# Patient Record
Sex: FEMALE | Race: ASIAN | NOT HISPANIC OR LATINO | Employment: UNEMPLOYED | URBAN - METROPOLITAN AREA
[De-identification: names, ages, dates, MRNs, and addresses within clinical notes are randomized per-mention and may not be internally consistent; named-entity substitution may affect disease eponyms.]

---

## 2018-06-29 ENCOUNTER — OFFICE VISIT - HEALTHEAST (OUTPATIENT)
Dept: FAMILY MEDICINE | Facility: CLINIC | Age: 58
End: 2018-06-29

## 2018-06-29 DIAGNOSIS — Z76.89 ENCOUNTER TO ESTABLISH CARE: ICD-10-CM

## 2018-06-29 DIAGNOSIS — I10 ESSENTIAL HYPERTENSION, BENIGN: ICD-10-CM

## 2018-06-29 DIAGNOSIS — B35.4: ICD-10-CM

## 2018-06-29 DIAGNOSIS — B35.3 DERMATOPHYTOSIS OF FOOT: ICD-10-CM

## 2018-06-29 ASSESSMENT — MIFFLIN-ST. JEOR: SCORE: 1267.83

## 2018-07-13 ENCOUNTER — OFFICE VISIT - HEALTHEAST (OUTPATIENT)
Dept: FAMILY MEDICINE | Facility: CLINIC | Age: 58
End: 2018-07-13

## 2018-07-13 DIAGNOSIS — M33.13 DERMATOMYOSITIS (H): ICD-10-CM

## 2018-07-13 DIAGNOSIS — I10 ESSENTIAL HYPERTENSION: ICD-10-CM

## 2018-07-13 LAB
ANION GAP SERPL CALCULATED.3IONS-SCNC: 11 MMOL/L (ref 5–18)
BASOPHILS # BLD AUTO: 0.1 THOU/UL (ref 0–0.2)
BASOPHILS NFR BLD AUTO: 1 % (ref 0–2)
BUN SERPL-MCNC: 18 MG/DL (ref 8–22)
CALCIUM SERPL-MCNC: 9.8 MG/DL (ref 8.5–10.5)
CHLORIDE BLD-SCNC: 103 MMOL/L (ref 98–107)
CHOLEST SERPL-MCNC: 295 MG/DL
CO2 SERPL-SCNC: 26 MMOL/L (ref 22–31)
CREAT SERPL-MCNC: 0.63 MG/DL (ref 0.6–1.1)
EOSINOPHIL # BLD AUTO: 0.2 THOU/UL (ref 0–0.4)
EOSINOPHIL NFR BLD AUTO: 1 % (ref 0–6)
ERYTHROCYTE [DISTWIDTH] IN BLOOD BY AUTOMATED COUNT: 13.2 % (ref 11–14.5)
FASTING STATUS PATIENT QL REPORTED: YES
GFR SERPL CREATININE-BSD FRML MDRD: >60 ML/MIN/1.73M2
GLUCOSE BLD-MCNC: 86 MG/DL (ref 70–125)
HCT VFR BLD AUTO: 42.2 % (ref 35–47)
HDLC SERPL-MCNC: 63 MG/DL
HGB BLD-MCNC: 13.6 G/DL (ref 12–16)
LDLC SERPL CALC-MCNC: 206 MG/DL
LYMPHOCYTES # BLD AUTO: 1.8 THOU/UL (ref 0.8–4.4)
LYMPHOCYTES NFR BLD AUTO: 16 % (ref 20–40)
MCH RBC QN AUTO: 26.6 PG (ref 27–34)
MCHC RBC AUTO-ENTMCNC: 32.2 G/DL (ref 32–36)
MCV RBC AUTO: 83 FL (ref 80–100)
MONOCYTES # BLD AUTO: 0.7 THOU/UL (ref 0–0.9)
MONOCYTES NFR BLD AUTO: 6 % (ref 2–10)
NEUTROPHILS # BLD AUTO: 8.8 THOU/UL (ref 2–7.7)
NEUTROPHILS NFR BLD AUTO: 76 % (ref 50–70)
PLATELET # BLD AUTO: 347 THOU/UL (ref 140–440)
PMV BLD AUTO: 7.4 FL (ref 7–10)
POTASSIUM BLD-SCNC: 4.5 MMOL/L (ref 3.5–5)
RBC # BLD AUTO: 5.1 MILL/UL (ref 3.8–5.4)
SODIUM SERPL-SCNC: 140 MMOL/L (ref 136–145)
TRIGL SERPL-MCNC: 128 MG/DL
WBC: 11.6 THOU/UL (ref 4–11)

## 2018-07-13 ASSESSMENT — MIFFLIN-ST. JEOR: SCORE: 1260.63

## 2018-08-17 ENCOUNTER — OFFICE VISIT - HEALTHEAST (OUTPATIENT)
Dept: FAMILY MEDICINE | Facility: CLINIC | Age: 58
End: 2018-08-17

## 2018-08-17 DIAGNOSIS — E78.5 HYPERLIPIDEMIA LDL GOAL <130: ICD-10-CM

## 2018-08-17 DIAGNOSIS — I10 BENIGN ESSENTIAL HYPERTENSION: ICD-10-CM

## 2018-08-17 ASSESSMENT — MIFFLIN-ST. JEOR: SCORE: 1275.09

## 2018-10-02 ENCOUNTER — OFFICE VISIT - HEALTHEAST (OUTPATIENT)
Dept: FAMILY MEDICINE | Facility: CLINIC | Age: 58
End: 2018-10-02

## 2018-10-02 DIAGNOSIS — Z23 INFLUENZA VACCINATION GIVEN: ICD-10-CM

## 2018-10-02 DIAGNOSIS — E78.5 HYPERLIPIDEMIA LDL GOAL <130: ICD-10-CM

## 2018-10-02 DIAGNOSIS — B37.2 YEAST DERMATITIS: ICD-10-CM

## 2018-10-02 DIAGNOSIS — I10 BENIGN ESSENTIAL HYPERTENSION: ICD-10-CM

## 2018-10-02 DIAGNOSIS — L98.9 SKIN LESION: ICD-10-CM

## 2018-10-02 ASSESSMENT — MIFFLIN-ST. JEOR: SCORE: 1275.64

## 2018-10-05 ENCOUNTER — RECORDS - HEALTHEAST (OUTPATIENT)
Dept: MAMMOGRAPHY | Facility: CLINIC | Age: 58
End: 2018-10-05

## 2018-10-05 DIAGNOSIS — Z12.31 ENCOUNTER FOR SCREENING MAMMOGRAM FOR MALIGNANT NEOPLASM OF BREAST: ICD-10-CM

## 2018-10-15 ENCOUNTER — COMMUNICATION - HEALTHEAST (OUTPATIENT)
Dept: FAMILY MEDICINE | Facility: CLINIC | Age: 58
End: 2018-10-15

## 2018-10-15 DIAGNOSIS — Z11.1 TUBERCULOSIS SCREENING: ICD-10-CM

## 2018-10-23 ENCOUNTER — OFFICE VISIT - HEALTHEAST (OUTPATIENT)
Dept: FAMILY MEDICINE | Facility: CLINIC | Age: 58
End: 2018-10-23

## 2018-10-23 DIAGNOSIS — Z02.1 PHYSICAL EXAM, PRE-EMPLOYMENT: ICD-10-CM

## 2018-10-23 DIAGNOSIS — I10 BENIGN ESSENTIAL HYPERTENSION: ICD-10-CM

## 2018-10-23 ASSESSMENT — MIFFLIN-ST. JEOR: SCORE: 1269.19

## 2018-10-26 LAB
GAMMA INTERFERON BACKGROUND BLD IA-ACNC: 0.09 IU/ML
M TB IFN-G BLD-IMP: NEGATIVE
MITOGEN IGNF BCKGRD COR BLD-ACNC: -0.01 IU/ML
MITOGEN IGNF BCKGRD COR BLD-ACNC: 0 IU/ML
QTF INTERPRETATION: NORMAL
QTF MITOGEN - NIL: >10 IU/ML

## 2018-10-31 ENCOUNTER — COMMUNICATION - HEALTHEAST (OUTPATIENT)
Dept: FAMILY MEDICINE | Facility: CLINIC | Age: 58
End: 2018-10-31

## 2018-11-26 ENCOUNTER — COMMUNICATION - HEALTHEAST (OUTPATIENT)
Dept: FAMILY MEDICINE | Facility: CLINIC | Age: 58
End: 2018-11-26

## 2018-12-21 ENCOUNTER — OFFICE VISIT - HEALTHEAST (OUTPATIENT)
Dept: FAMILY MEDICINE | Facility: CLINIC | Age: 58
End: 2018-12-21

## 2018-12-21 ENCOUNTER — RECORDS - HEALTHEAST (OUTPATIENT)
Dept: ADMINISTRATIVE | Facility: OTHER | Age: 58
End: 2018-12-21

## 2018-12-21 ENCOUNTER — COMMUNICATION - HEALTHEAST (OUTPATIENT)
Dept: NURSING | Facility: CLINIC | Age: 58
End: 2018-12-21

## 2018-12-21 DIAGNOSIS — E78.5 HYPERLIPIDEMIA LDL GOAL <130: ICD-10-CM

## 2018-12-21 DIAGNOSIS — Z12.12 ENCOUNTER FOR COLORECTAL CANCER SCREENING: ICD-10-CM

## 2018-12-21 DIAGNOSIS — I10 BENIGN ESSENTIAL HYPERTENSION: ICD-10-CM

## 2018-12-21 DIAGNOSIS — Z12.11 ENCOUNTER FOR COLORECTAL CANCER SCREENING: ICD-10-CM

## 2018-12-21 LAB
CHOLEST SERPL-MCNC: 272 MG/DL
FASTING STATUS PATIENT QL REPORTED: YES
HDLC SERPL-MCNC: 62 MG/DL
LDLC SERPL CALC-MCNC: 181 MG/DL
TRIGL SERPL-MCNC: 145 MG/DL

## 2018-12-21 ASSESSMENT — MIFFLIN-ST. JEOR: SCORE: 1278.27

## 2020-01-03 ENCOUNTER — COMMUNICATION - HEALTHEAST (OUTPATIENT)
Dept: FAMILY MEDICINE | Facility: CLINIC | Age: 60
End: 2020-01-03

## 2020-01-03 DIAGNOSIS — I10 BENIGN ESSENTIAL HYPERTENSION: ICD-10-CM

## 2020-02-13 ENCOUNTER — COMMUNICATION - HEALTHEAST (OUTPATIENT)
Dept: FAMILY MEDICINE | Facility: CLINIC | Age: 60
End: 2020-02-13

## 2020-02-13 DIAGNOSIS — I10 BENIGN ESSENTIAL HYPERTENSION: ICD-10-CM

## 2020-03-03 ENCOUNTER — COMMUNICATION - HEALTHEAST (OUTPATIENT)
Dept: FAMILY MEDICINE | Facility: CLINIC | Age: 60
End: 2020-03-03

## 2020-03-10 ENCOUNTER — OFFICE VISIT - HEALTHEAST (OUTPATIENT)
Dept: FAMILY MEDICINE | Facility: CLINIC | Age: 60
End: 2020-03-10

## 2020-03-10 DIAGNOSIS — F43.10 PTSD (POST-TRAUMATIC STRESS DISORDER): ICD-10-CM

## 2020-03-10 DIAGNOSIS — I10 BENIGN ESSENTIAL HYPERTENSION: ICD-10-CM

## 2020-03-10 DIAGNOSIS — E78.5 HYPERLIPIDEMIA LDL GOAL <160: ICD-10-CM

## 2020-03-10 DIAGNOSIS — Z23 NEED FOR IMMUNIZATION AGAINST INFLUENZA: ICD-10-CM

## 2020-03-10 LAB
ANION GAP SERPL CALCULATED.3IONS-SCNC: 14 MMOL/L (ref 5–18)
BUN SERPL-MCNC: 20 MG/DL (ref 8–22)
CALCIUM SERPL-MCNC: 10 MG/DL (ref 8.5–10.5)
CHLORIDE BLD-SCNC: 102 MMOL/L (ref 98–107)
CHOLEST SERPL-MCNC: 287 MG/DL
CO2 SERPL-SCNC: 25 MMOL/L (ref 22–31)
CREAT SERPL-MCNC: 0.68 MG/DL (ref 0.6–1.1)
FASTING STATUS PATIENT QL REPORTED: YES
GFR SERPL CREATININE-BSD FRML MDRD: >60 ML/MIN/1.73M2
GLUCOSE BLD-MCNC: 109 MG/DL (ref 70–125)
HDLC SERPL-MCNC: 62 MG/DL
LDLC SERPL CALC-MCNC: 208 MG/DL
POTASSIUM BLD-SCNC: 4 MMOL/L (ref 3.5–5)
SODIUM SERPL-SCNC: 141 MMOL/L (ref 136–145)
TRIGL SERPL-MCNC: 86 MG/DL

## 2020-03-10 ASSESSMENT — MIFFLIN-ST. JEOR: SCORE: 1303.76

## 2020-04-17 ENCOUNTER — COMMUNICATION - HEALTHEAST (OUTPATIENT)
Dept: ADMINISTRATIVE | Facility: CLINIC | Age: 60
End: 2020-04-17

## 2020-05-08 ENCOUNTER — VIRTUAL VISIT (OUTPATIENT)
Dept: URGENT CARE | Facility: CLINIC | Age: 60
End: 2020-05-08
Payer: COMMERCIAL

## 2020-05-08 DIAGNOSIS — R52 BODY ACHES: Primary | ICD-10-CM

## 2020-05-08 DIAGNOSIS — R42 DIZZINESS: ICD-10-CM

## 2020-05-08 DIAGNOSIS — R63.0 LOSS OF APPETITE: ICD-10-CM

## 2020-05-08 DIAGNOSIS — R51.9 NONINTRACTABLE HEADACHE, UNSPECIFIED CHRONICITY PATTERN, UNSPECIFIED HEADACHE TYPE: ICD-10-CM

## 2020-05-08 PROBLEM — I10 BENIGN ESSENTIAL HYPERTENSION: Status: ACTIVE | Noted: 2018-08-17

## 2020-05-08 PROBLEM — E78.5 HYPERLIPIDEMIA LDL GOAL <130: Status: ACTIVE | Noted: 2018-08-17

## 2020-05-08 PROCEDURE — 99203 OFFICE O/P NEW LOW 30 MIN: CPT | Mod: 95 | Performed by: STUDENT IN AN ORGANIZED HEALTH CARE EDUCATION/TRAINING PROGRAM

## 2020-05-08 RX ORDER — HYDROCHLOROTHIAZIDE 25 MG/1
25 TABLET ORAL DAILY
COMMUNITY
Start: 2020-03-10 | End: 2021-08-05

## 2020-05-08 RX ORDER — LISINOPRIL 20 MG/1
20 TABLET ORAL DAILY
COMMUNITY
Start: 2020-03-10 | End: 2021-08-05

## 2020-05-08 NOTE — PROGRESS NOTES
"Breana Conner is a 60 year old female who is being evaluated via a billable telephone visit.      The patient has been notified of following:     \"This telephone visit will be conducted via a call between you and your physician/provider. We have found that certain health care needs can be provided without the need for a physical exam.  This service lets us provide the care you need with a short phone conversation.  If a prescription is necessary we can send it directly to your pharmacy.  If lab work is needed we can place an order for that and you can then stop by our lab to have the test done at a later time.    Telephone visits are billed at different rates depending on your insurance coverage. During this emergency period, for some insurers they may be billed the same as an in-person visit.  Please reach out to your insurance provider with any questions.    If during the course of the call the physician/provider feels a telephone visit is not appropriate, you will not be charged for this service.\"    Patient has given verbal consent for Telephone visit?  Yes      How would you like to obtain your AVS? Mail a copy    Subjective     Breana Conner is a 60 year old female who presents to clinic today for the following health issues:    HPI     Patient is being seen by phone visit for concern about dizziness and body aches. This visit is being done by phone with a Jossy .     Acute Illness   Acute illness concerns: Dizziness, body aches, subjective fevers  Onset: 3 days     Fever: YES    Chills/Sweats: YES    Headache (location?): YES    Sinus Pressure:no    Conjunctivitis:  no    Ear Pain: no    Rhinorrhea: no    Congestion: no    Sore Throat: no     Cough: no    Wheeze: no    Decreased Appetite: YES    Nausea: no    Vomiting: no    Diarrhea:  no    Dysuria/Freq.: no    Fatigue/Achiness: YES    Sick/Strep Exposure: YES- at woek     Therapies Tried and outcome: none    BP Readings from Last 3 Encounters:   No " data found for BP    Wt Readings from Last 3 Encounters:   No data found for Wt                    Reviewed and updated as needed this visit by Provider  Allergies  Meds  Problems  Med Hx  Surg Hx  Fam Hx  Soc Hx        Review of Systems   ROS COMP: Constitutional, HEENT, cardiovascular, pulmonary, GI, , musculoskeletal, neuro, skin, endocrine and psych systems are negative, except as otherwise noted.       Objective   Reported vitals:  There were no vitals taken for this visit.   healthy, alert and no distress  PSYCH: Alert and oriented times 3; coherent speech, normal   rate and volume, able to articulate logical thoughts, able   to abstract reason, no tangential thoughts, no hallucinations   or delusions  Her affect is normal  RESP: No cough, no audible wheezing, able to talk in full sentences  Remainder of exam unable to be completed due to telephone visits    Diagnostic Test Results:  none         Assessment/Plan:  ASSESSMENT AND PLAN    ICD-10-CM    1. Body aches  R52    2. Nonintractable headache, unspecified chronicity pattern, unspecified headache type  R51    3. Loss of appetite  R63.0    4. Dizziness  R42      I will have patient get scheduled for Covid testing and instructed her to stay home for at least 10 days and at least 72 hours after symptoms resolve. I will refer her to the [facility] as well and will provide her with a work. Note. She voices understanding and denies any other questions or concerns at this time.       No follow-ups on file.      Phone call duration:  15 minutes    Che Bullock PA-C

## 2020-05-08 NOTE — LETTER
May 8, 2020      Breana Conner  [address] [place] [address]        To Whom It May Concern:    Breana Conner  was seen on 5/8.  Please excuse her  until 5/19 due to acute illness. She will be tested for Covid-19 and if positive cannot return until 10 days from symptom onset and 72 hours after resolution of symptoms.         Sincerely,        Che Bullock PA-C

## 2020-05-08 NOTE — PROGRESS NOTES
Breana Conner is 60 year old with a past history of HTN who is evaluated via virtual urgent care  I spoke with the patient and her niece today. Patient herself felt unwell to answer the phone and delegated the niece to speak on her behalf.   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 Has endorsed 3 days of fever/diffuse body aches/headache/dizziness and poor appetite. Dizziness is so bad that she has not been able to get out of bed for two days. Denies vertigo. Endorses lack of appetite but denies n/v/d/abd pain. No  sxs. Denies resp sxs such as cough/rhinorrhea/sob.   She takes lisinopril and hydrochlorothiazide which she was able to tolerate for the past two days. .     I have recommended this patient go to the emergency department for evaluation.     Pt dicussed and staffed with Dr. Dorcas Leslie MD   Internal Medicine PGY2  5/8/2020 2:43 PM

## 2020-05-08 NOTE — PROGRESS NOTES
I was present during the key/critical portion of the telephone visit. The patient acknowledged that I participated in the telephone conversation. I discussed the case with the resident and agree with the note as documented by the resident.    I personally spent a total of 3-5 minutes speaking with Breana Conner through an  during today s visit.     Smione Toscano MD  5/8/2020 at 3:08 PM         [facility]  Department of Family Medicine and On license of [facility]

## 2020-07-22 ENCOUNTER — OFFICE VISIT - HEALTHEAST (OUTPATIENT)
Dept: FAMILY MEDICINE | Facility: CLINIC | Age: 60
End: 2020-07-22

## 2020-07-22 DIAGNOSIS — M17.12 OSTEOARTHRITIS OF LEFT KNEE, UNSPECIFIED OSTEOARTHRITIS TYPE: ICD-10-CM

## 2020-07-22 DIAGNOSIS — I10 BENIGN ESSENTIAL HYPERTENSION: ICD-10-CM

## 2020-07-22 ASSESSMENT — MIFFLIN-ST. JEOR: SCORE: 1335.82

## 2020-08-12 ENCOUNTER — OFFICE VISIT - HEALTHEAST (OUTPATIENT)
Dept: FAMILY MEDICINE | Facility: CLINIC | Age: 60
End: 2020-08-12

## 2020-08-12 ENCOUNTER — RECORDS - HEALTHEAST (OUTPATIENT)
Dept: MAMMOGRAPHY | Facility: CLINIC | Age: 60
End: 2020-08-12

## 2020-08-12 DIAGNOSIS — M17.12 OSTEOARTHRITIS OF LEFT KNEE, UNSPECIFIED OSTEOARTHRITIS TYPE: ICD-10-CM

## 2020-08-12 DIAGNOSIS — Z12.31 VISIT FOR SCREENING MAMMOGRAM: ICD-10-CM

## 2020-08-12 DIAGNOSIS — Z12.31 ENCOUNTER FOR SCREENING MAMMOGRAM FOR MALIGNANT NEOPLASM OF BREAST: ICD-10-CM

## 2020-08-12 DIAGNOSIS — I10 BENIGN ESSENTIAL HYPERTENSION: ICD-10-CM

## 2020-08-12 ASSESSMENT — MIFFLIN-ST. JEOR: SCORE: 1320.56

## 2020-09-09 ENCOUNTER — OFFICE VISIT - HEALTHEAST (OUTPATIENT)
Dept: FAMILY MEDICINE | Facility: CLINIC | Age: 60
End: 2020-09-09

## 2020-09-09 DIAGNOSIS — I10 BENIGN ESSENTIAL HYPERTENSION: ICD-10-CM

## 2020-09-09 DIAGNOSIS — Z23 NEED FOR IMMUNIZATION AGAINST INFLUENZA: ICD-10-CM

## 2020-09-09 ASSESSMENT — MIFFLIN-ST. JEOR: SCORE: 1313.64

## 2021-06-01 VITALS — HEIGHT: 60 IN | WEIGHT: 172.9 LBS | BODY MASS INDEX: 33.95 KG/M2

## 2021-06-01 VITALS — HEIGHT: 60 IN | WEIGHT: 171.31 LBS | BODY MASS INDEX: 33.63 KG/M2

## 2021-06-01 VITALS — BODY MASS INDEX: 34.26 KG/M2 | HEIGHT: 60 IN | WEIGHT: 174.5 LBS

## 2021-06-02 VITALS — WEIGHT: 174.8 LBS | BODY MASS INDEX: 35.24 KG/M2 | HEIGHT: 59 IN

## 2021-06-02 VITALS — HEIGHT: 59 IN | WEIGHT: 173.38 LBS | BODY MASS INDEX: 34.95 KG/M2

## 2021-06-02 VITALS — HEIGHT: 59 IN | BODY MASS INDEX: 35.36 KG/M2 | WEIGHT: 175.38 LBS

## 2021-06-04 VITALS
TEMPERATURE: 98 F | DIASTOLIC BLOOD PRESSURE: 92 MMHG | WEIGHT: 188.06 LBS | BODY MASS INDEX: 37.91 KG/M2 | HEART RATE: 72 BPM | SYSTOLIC BLOOD PRESSURE: 152 MMHG | HEIGHT: 59 IN | RESPIRATION RATE: 20 BRPM

## 2021-06-04 VITALS
HEIGHT: 59 IN | HEART RATE: 92 BPM | SYSTOLIC BLOOD PRESSURE: 130 MMHG | RESPIRATION RATE: 11 BRPM | BODY MASS INDEX: 37.24 KG/M2 | TEMPERATURE: 98 F | WEIGHT: 184.7 LBS | OXYGEN SATURATION: 95 % | DIASTOLIC BLOOD PRESSURE: 88 MMHG

## 2021-06-04 VITALS
DIASTOLIC BLOOD PRESSURE: 80 MMHG | HEART RATE: 82 BPM | OXYGEN SATURATION: 96 % | SYSTOLIC BLOOD PRESSURE: 132 MMHG | HEIGHT: 60 IN | TEMPERATURE: 97.7 F | BODY MASS INDEX: 35.93 KG/M2 | WEIGHT: 183 LBS

## 2021-06-04 VITALS
BODY MASS INDEX: 36.49 KG/M2 | SYSTOLIC BLOOD PRESSURE: 138 MMHG | WEIGHT: 181 LBS | DIASTOLIC BLOOD PRESSURE: 82 MMHG | TEMPERATURE: 98.4 F | HEIGHT: 59 IN | HEART RATE: 84 BPM | OXYGEN SATURATION: 97 % | RESPIRATION RATE: 16 BRPM

## 2021-06-04 NOTE — TELEPHONE ENCOUNTER
RN cannot approve Refill Request--Lisinopril     RN can NOT refill this medication PCP messaged that patient is overdue for Labs, Office Visit and BP Check and Protocol failed and NO refill given.    RN cannot approve Refill Request- Hydrochlorothiazide     RN can NOT refill this medication PCP messaged that patient is overdue for Labs, Office Visit and BP check and Protocol failed and NO refill given.      Chanda Soler, [facility] Triage/Med Refill 1/5/2020    Requested Prescriptions   Pending Prescriptions Disp Refills     lisinopril (PRINIVIL,ZESTRIL) 20 MG tablet [Pharmacy Med Name: LISINOPRIL 20 MG TABLET] 30 tablet 0     Sig: TAKE 1 TABLET BY MOUTH DAILY       Ace Inhibitors Refill Protocol Failed - 1/3/2020  8:50 AM        Failed - PCP or prescribing provider visit in past 12 months       Last office visit with prescriber/PCP: 12/21/2018 Jose Geiger MD OR same dept: Visit date not found OR same specialty: 12/21/2018 Jose Geiger MD  Last physical: Visit date not found Last MTM visit: Visit date not found   Next visit within 3 mo: Visit date not found  Next physical within 3 mo: Visit date not found  Prescriber OR PCP: Jose Geiger MD  Last diagnosis associated with med order: There are no diagnoses linked to this encounter.  If protocol passes may refill for 12 months if within 3 months of last provider visit (or a total of 15 months).             Failed - Serum Potassium in past 12 months     No results found for: LN-POTASSIUM          Failed - Blood pressure filed in past 12 months     BP Readings from Last 1 Encounters:   12/21/18 130/78             Failed - Serum Creatinine in past 12 months     Creatinine   Date Value Ref Range Status   07/13/2018 0.63 0.60 - 1.10 mg/dL Final             hydroCHLOROthiazide (HYDRODIURIL) 25 MG tablet [Pharmacy Med Name: HYDROCHLOROTHIAZIDE 25 MG TAB] 30 tablet 0     Sig: TAKE ONE TABLET BY MOUTH ONCE DAILY       Diuretics/Combination Diuretics Refill Protocol   Failed - 1/3/2020  8:50 AM        Failed - Visit with PCP or prescribing provider visit in past 12 months     Last office visit with prescriber/PCP: 12/21/2018 Jose Geiger MD OR same dept: Visit date not found OR same specialty: 12/21/2018 Jose Geiger MD  Last physical: Visit date not found Last MTM visit: Visit date not found   Next visit within 3 mo: Visit date not found  Next physical within 3 mo: Visit date not found  Prescriber OR PCP: Jose Geiger MD  Last diagnosis associated with med order: There are no diagnoses linked to this encounter.  If protocol passes may refill for 12 months if within 3 months of last provider visit (or a total of 15 months).             Failed - Serum Potassium in past 12 months      No results found for: LN-POTASSIUM          Failed - Serum Sodium in past 12 months      No results found for: LN-SODIUM          Failed - Blood pressure on file in past 12 months     BP Readings from Last 1 Encounters:   12/21/18 130/78             Failed - Serum Creatinine in past 12 months      Creatinine   Date Value Ref Range Status   07/13/2018 0.63 0.60 - 1.10 mg/dL Final

## 2021-06-06 NOTE — PATIENT INSTRUCTIONS - HE
Hypertension:     BMP lab ordered.    Continue hydrochlorothiazide and lisinopril. Refills were given.    Hyperlipidemia:     Cholesterol labs ordered.    Eat less fried, oily, or fatty foods.    Work on healthy diet and regular exercise for weight loss.     Request for citizenship waiver:     Referral given for psychology evaluation.    Immunizations:     Influenza immunization given today.     Follow up:     Return to see a female provider in 3-4 weeks for annual physical and pap smear.

## 2021-06-06 NOTE — TELEPHONE ENCOUNTER
Who is calling:  Patient   Reason for Call:  Needs transportation for upcoming appointment on Tuesday March 10 at 8:15- patient was asked to arrive at 8:00 am  Date of last appointment with primary care: 12.21.2018  Okay to leave a detailed message: Yes

## 2021-06-06 NOTE — TELEPHONE ENCOUNTER
RN cannot approve Refill Request    RN can NOT refill this medication Protocol failed and NO refill given.     Kim Ramos, [facility] Triage/Med Refill 2/19/2020    Requested Prescriptions   Pending Prescriptions Disp Refills     lisinopriL (PRINIVIL,ZESTRIL) 20 MG tablet [Pharmacy Med Name: LISINOPRIL 20 MG TABLET] 30 tablet 0     Sig: TAKE 1 TABLET BY MOUTH DAILY       Ace Inhibitors Refill Protocol Failed - 2/13/2020 12:22 PM        Failed - PCP or prescribing provider visit in past 12 months       Last office visit with prescriber/PCP: 12/21/2018 Jose Geiger MD OR same dept: Visit date not found OR same specialty: 12/21/2018 Jose Geiger MD  Last physical: Visit date not found Last MTM visit: Visit date not found   Next visit within 3 mo: Visit date not found  Next physical within 3 mo: Visit date not found  Prescriber OR PCP: Jose Geiger MD  Last diagnosis associated with med order: 1. Benign essential hypertension  - lisinopril (PRINIVIL,ZESTRIL) 20 MG tablet [Pharmacy Med Name: LISINOPRIL 20 MG TABLET]; TAKE 1 TABLET BY MOUTH DAILY  Dispense: 30 tablet; Refill: 0  - hydroCHLOROthiazide (HYDRODIURIL) 25 MG tablet [Pharmacy Med Name: HYDROCHLOROTHIAZIDE 25 MG TAB]; TAKE ONE TABLET BY MOUTH ONCE DAILY  Dispense: 30 tablet; Refill: 0    If protocol passes may refill for 12 months if within 3 months of last provider visit (or a total of 15 months).             Failed - Serum Potassium in past 12 months     No results found for: LN-POTASSIUM          Failed - Blood pressure filed in past 12 months     BP Readings from Last 1 Encounters:   12/21/18 130/78             Failed - Serum Creatinine in past 12 months     Creatinine   Date Value Ref Range Status   07/13/2018 0.63 0.60 - 1.10 mg/dL Final             hydroCHLOROthiazide (HYDRODIURIL) 25 MG tablet [Pharmacy Med Name: HYDROCHLOROTHIAZIDE 25 MG TAB] 30 tablet 0     Sig: TAKE ONE TABLET BY MOUTH ONCE DAILY       Diuretics/Combination Diuretics Refill  Protocol  Failed - 2/13/2020 12:22 PM        Failed - Visit with PCP or prescribing provider visit in past 12 months     Last office visit with prescriber/PCP: 12/21/2018 Jose Geiger MD OR same dept: Visit date not found OR same specialty: 12/21/2018 Jose Geiger MD  Last physical: Visit date not found Last MTM visit: Visit date not found   Next visit within 3 mo: Visit date not found  Next physical within 3 mo: Visit date not found  Prescriber OR PCP: Jose Geiger MD  Last diagnosis associated with med order: 1. Benign essential hypertension  - lisinopril (PRINIVIL,ZESTRIL) 20 MG tablet [Pharmacy Med Name: LISINOPRIL 20 MG TABLET]; TAKE 1 TABLET BY MOUTH DAILY  Dispense: 30 tablet; Refill: 0  - hydroCHLOROthiazide (HYDRODIURIL) 25 MG tablet [Pharmacy Med Name: HYDROCHLOROTHIAZIDE 25 MG TAB]; TAKE ONE TABLET BY MOUTH ONCE DAILY  Dispense: 30 tablet; Refill: 0    If protocol passes may refill for 12 months if within 3 months of last provider visit (or a total of 15 months).             Failed - Serum Potassium in past 12 months      No results found for: LN-POTASSIUM          Failed - Serum Sodium in past 12 months      No results found for: LN-SODIUM          Failed - Blood pressure on file in past 12 months     BP Readings from Last 1 Encounters:   12/21/18 130/78             Failed - Serum Creatinine in past 12 months      Creatinine   Date Value Ref Range Status   07/13/2018 0.63 0.60 - 1.10 mg/dL Final
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
## 2021-06-06 NOTE — TELEPHONE ENCOUNTER
Benign essential hypertension blood pressures well controlled she takes lisinopril hydrochlorothiazide she is going to the bathroom 3 times at night but she says her sleep is not disturbed follow-up in 6 months she can obtain refills every month.

## 2021-06-09 NOTE — PROGRESS NOTES
"ASSESSMENT and plan   1. Benign essential hypertension  Blood pressure is elevated we recheck that and it was normotensive.  She is been taking her blood pressure medications faithfully I will recheck it in approximately 4 to 6 weeks.    2. Osteoarthritis of left knee, unspecified osteoarthritis type  She is complaining of pain on the inferior aspect of the left knee prescribed meloxicam side effects of medication discussed no fall she was off work has difficulty flexing her knee.  - meloxicam (MOBIC) 15 MG tablet; Take 1 tablet (15 mg total) by mouth daily.  Dispense: 30 tablet; Refill: 2      There are no Patient Instructions on file for this visit.    No orders of the defined types were placed in this encounter.    There are no discontinued medications.    No follow-ups on file.    CHIEF COMPLAINT:  Chief Complaint   Patient presents with     Knee Pain     left       HISTORY OF PRESENT ILLNESS:  Breana is a 60 y.o. female who is here today to follow-up for her knee pain of 1 weeks duration and her hypertension she reports that she started feeling pain when she was walking on her left knee she denies any falls in the standing up does not bother her knee she is never had this problem before and states she has been taking her blood pressure regularly.  The pain in her knee is a deep type of pain and only present when she walks    REVIEW OF SYSTEMS:     Musculoskeletal positive for knee pain  Otherwise 10 point review of  All other systems are negative.    PFSH:    Medical and social history obtained with the help of a certified     TOBACCO USE:  Social History     Tobacco Use   Smoking Status Never Smoker   Smokeless Tobacco Never Used       VITALS:  Vitals:    07/22/20 1146   BP: (!) 152/96   Pulse: 72   Resp: 20   Temp: 98  F (36.7  C)   TempSrc: Oral   Weight: 188 lb 1 oz (85.3 kg)   Height: 4' 11.45\" (1.51 m)     Wt Readings from Last 3 Encounters:   07/22/20 188 lb 1 oz (85.3 kg)   03/10/20 181 lb (82.1 " kg)   12/21/18 175 lb 6 oz (79.5 kg)       PHYSICAL EXAM:  Interactive lady seen [facility] exam no acute distress  HEENT neck supple mucous members moist, no lymph enlargement noted in the neck  Respiratory system clear to auscultation equal breath sounds no wheeze no crackles  CVS regular rate and rhythm no murmurs rubs gallops appreciated  Musculoskeletal system no effusion noted on cursory exam of the left knee.  Anterior and posterior drawer tests are negative Alphonso's test is negative flexion of the knee limited to 60 degrees because of tenderness extension is normal and range of motion    Power of left quadriceps is 5 out of 5    DATA REVIEWED:  Additional History from Old Records Summarized (2): 0  Decision to Obtain Records (1): 0  Radiology Tests Summarized or Ordered (1): 0  Labs Reviewed or Ordered (1): 0  Medicine Test Summarized or Ordered (1): 0  Independent Review of EKG or X-RAY(2 each): 0    The visit lasted a total of 25 minutes face to face with the patient. Over 50% of the time was spent counseling and educating the patient about   Knee pain and hypertension.    MEDICATIONS:  Current Outpatient Medications   Medication Sig Dispense Refill     hydroCHLOROthiazide (HYDRODIURIL) 25 MG tablet Take 1 tablet (25 mg total) by mouth daily. 30 tablet 9     lisinopriL (PRINIVIL,ZESTRIL) 20 MG tablet Take 1 tablet (20 mg total) by mouth daily. 30 tablet 9     hydrocortisone valerate (WEST-ERMELINDA) 0.2 % ointment APPLY TO AFFECTED AREA TWICE DAILY. 45 g 0     meloxicam (MOBIC) 15 MG tablet Take 1 tablet (15 mg total) by mouth daily. 30 tablet 2     No current facility-administered medications for this visit.      Jose gilliam md

## 2021-06-10 NOTE — PROGRESS NOTES
Assessment and plan  1. Visit for screening mammogram patient agrees for mammogram today. Mammo Screening Bilateral   2. Benign essential hypertension blood pressure still elevated she did not have any medications in the form of hydrochlorothiazide and lisinopril she said they were not available at the pharmacy.  I represcribed the medications and we called the pharmacy and apparently they were filled on 10 August but not picked up patient was made aware of this I will see her again in 4 to 6 weeks to recheck her blood pressure. lisinopriL (PRINIVIL,ZESTRIL) 20 MG tablet    hydroCHLOROthiazide (HYDRODIURIL) 25 MG tablet   3. Osteoarthritis of left knee, unspecified osteoarthritis type the meloxicam is working she no longer has knee pain she no longer has any difficulty standing or walking and is returned to work she can continue using the medication for another month then stop meloxicam (MOBIC) 15 MG tablet       There are no Patient Instructions on file for this visit.    No orders of the defined types were placed in this encounter.    There are no discontinued medications.    No follow-ups on file.    CHIEF COMPLAINT:  Chief Complaint   Patient presents with     Hypertension     Foot Pain     Left foot       HISTORY OF PRESENT ILLNESS:  Breana is a 60 y.o. female here for follow-up for hypertension and left knee pain she also needs a mammogram.  She reports that the knee pain medication helped her she no longer has knee pain he can sleep walk and work without discomfort no side effects are noted with the medication she did not get her blood pressure medication she said it was not given when they went to the pharmacy.    REVIEW OF SYSTEMS:     Musculoskeletal negative for knee pain  According to the patient 12 point review  All other systems are negative.    PFSH:    Social medical history obtained with the help of a certified     TOBACCO USE:  Social History     Tobacco Use   Smoking Status Never Smoker  "  Smokeless Tobacco Never Used       VITALS:  Vitals:    08/12/20 0805   BP: 138/90   Patient Site: Left Arm   Patient Position: Sitting   Cuff Size: Adult Large   Pulse: 92   Resp: 11   Temp: 98  F (36.7  C)   TempSrc: Oral   SpO2: 95%   Weight: 184 lb 11.2 oz (83.8 kg)   Height: 4' 11.45\" (1.51 m)     Wt Readings from Last 3 Encounters:   08/12/20 184 lb 11.2 oz (83.8 kg)   07/22/20 188 lb 1 oz (85.3 kg)   03/10/20 181 lb (82.1 kg)       PHYSICAL EXAM:  Interactive female sitting comfortably exam no acute distress  Respiratory system clear to auscultation good breath sounds no wheeze no crackles  CVS regular rate and rhythm no murmurs rubs gallops appreciated  Musculoskeletal system no tenderness elicited when I palpate the left patella.  She has no tenderness when I palpate the left quadriceps.  Lachman's test is negative anterior and posterior drawer tests are negative.  Neuro cranial nerves II to XII intact, motor tone lower extremities is normal gait is antalgic    DATA REVIEWED:  Additional History from Old Records Summarized (2): 0  Decision to Obtain Records (1): 0  Radiology Tests Summarized or Ordered (1): 0  Labs Reviewed or Ordered (1): 0  Medicine Test Summarized or Ordered (1): 0  Independent Review of EKG or X-RAY(2 each): 0    The visit lasted a total of 25 minutes face to face with the patient. Over 50% of the time was spent counseling and educating the patient about   Hypertension and knee pain we also explained the concept of mammograms to her.    MEDICATIONS:  Current Outpatient Medications   Medication Sig Dispense Refill     hydroCHLOROthiazide (HYDRODIURIL) 25 MG tablet Take 1 tablet (25 mg total) by mouth daily. 30 tablet 9     lisinopriL (PRINIVIL,ZESTRIL) 20 MG tablet Take 1 tablet (20 mg total) by mouth daily. 30 tablet 9     meloxicam (MOBIC) 15 MG tablet Take 1 tablet (15 mg total) by mouth daily. 30 tablet 2     hydrocortisone valerate (WEST-ERMELINDA) 0.2 % ointment APPLY TO AFFECTED AREA " TWICE DAILY. 45 g 0     No current facility-administered medications for this visit.      Lakesha SCHMIDT

## 2021-06-11 NOTE — PROGRESS NOTES
"ASSESSMENT and plan   1. Need for immunization against influenza  Agrees to have vaccination today  - Influenza, Recombinant, Inj, Quadrivalent, PF, 18+YRS    2. Benign essential hypertension  Blood pressure well controlled she is taking both hydrochlorothiazide and lisinopril no side effects noted follow-up in approximately 3 months.      There are no Patient Instructions on file for this visit.    Orders Placed This Encounter   Procedures     Influenza, Recombinant, Inj, Quadrivalent, PF, 18+YRS     There are no discontinued medications.    No follow-ups on file.    CHIEF COMPLAINT:  Chief Complaint   Patient presents with     Hypertension       HISTORY OF PRESENT ILLNESS:  Breana is a 60 y.o. female who is here to follow-up for hypertension she also wishes notes the situation with her citizenship review.  I had referred her to psychology but no one ever called her back because this happened in March.  She reports that she has no knee pain and stopped using the meloxicam.  She is sleeping well and feels energetic.    REVIEW OF SYSTEMS:     10 point review of  All other systems are negative.    PFSH:  Social history reviewed with the help of a certified  today    TOBACCO USE:  Social History     Tobacco Use   Smoking Status Never Smoker   Smokeless Tobacco Never Used       VITALS:  Vitals:    09/09/20 0834   BP: 132/80   Patient Site: Left Arm   Patient Position: Sitting   Cuff Size: Adult Large   Pulse: 82   Temp: 97.7  F (36.5  C)   TempSrc: Oral   SpO2: 96%   Weight: 183 lb (83 kg)   Height: 4' 11.5\" (1.511 m)     Wt Readings from Last 3 Encounters:   09/09/20 183 lb (83 kg)   08/12/20 184 lb 11.2 oz (83.8 kg)   07/22/20 188 lb 1 oz (85.3 kg)       PHYSICAL EXAM:  Interactive female seen  no acute distress  CVS regular rate and rhythm no murmurs rubs gallops appreciated no pedal edema  Respiratory system clear to auscultation equal breath sounds no wheeze no crackles    DATA REVIEWED:  Additional History " from Old Records Summarized (2): 0  Decision to Obtain Records (1): 0  Radiology Tests Summarized or Ordered (1): 0  Labs Reviewed or Ordered (1): 0  Medicine Test Summarized or Ordered (1): 0  Independent Review of EKG or X-RAY(2 each): 0    The visit lasted a total of 15 minutes face to face with the patient. Over 50% of the time was spent counseling and educating the patient about   Hypertension.    MEDICATIONS:  Current Outpatient Medications   Medication Sig Dispense Refill     hydroCHLOROthiazide (HYDRODIURIL) 25 MG tablet Take 1 tablet (25 mg total) by mouth daily. 30 tablet 9     lisinopriL (PRINIVIL,ZESTRIL) 20 MG tablet Take 1 tablet (20 mg total) by mouth daily. 30 tablet 9     hydrocortisone valerate (WEST-ERMELINDA) 0.2 % ointment APPLY TO AFFECTED AREA TWICE DAILY. 45 g 0     meloxicam (MOBIC) 15 MG tablet Take 1 tablet (15 mg total) by mouth daily. 30 tablet 2     No current facility-administered medications for this visit.      Lakesha SCHMIDT

## 2021-06-17 NOTE — TELEPHONE ENCOUNTER
Telephone Encounter by Linda Baeza at 4/17/2020  1:02 PM     Author: Linda Baeza Service: -- Author Type: --    Filed: 4/17/2020  1:07 PM Encounter Date: 4/17/2020 Status: Signed    : Linda Baeza Dr.,    Regarding referral to [facility]    [facility], Dr. Luong reviewed the referral and it appears that she is able to hold a job and has not attempted a course. It would be difficult for us to make a case for medical necessity given her functional capabilities. We recommend at least 6 months of a prep course to show that she made effort and to assess true learning abilities.  Please let me know if you have further questions.    Thanks  Latanya

## 2021-06-19 NOTE — PROGRESS NOTES
ASSESSMENT and plan  1. Encounter to establish care  Patient moved to [place] approximately 3 weeks ago she lived for 4 years in [place].  According to the patient she did not have routine care there is as she had no insurance.  She was seen for a colonoscopy.  He was reported to be normal.  Her niece will bring in the name of the clinic at the next visit so I can access results    2. Essential hypertension, benign  Blood pressures elevated today she admits being dizzy and having headaches.  Denies chest pain.  Hydrochlorothiazide started side effects discussed recheck in 2 weeks.  I will check a BMP at next visit    3. Dermatophytosis of foot  She has a large very itchy rash present from the sole of her left foot extending up to the ankle.  It has been present for years slowly increasing in size.  There are no elevations in the skin noted the rash is flat and pruritic.    4. Body dermatophytosis  She has a rash involving the left abdominal wall and extending up to an area just inferior to the left breast the rash measures approximately 10 x 12 cm in size.  This is a fungal infection she was informed of this.  Diflucan has been started for this and the rash on the foot          There are no Patient Instructions on file for this visit.    Orders Placed This Encounter   Procedures     Tdap vaccine,  6yo or older,  IM     There are no discontinued medications.    No Follow-up on file.    CHIEF COMPLAINT:  Chief Complaint   Patient presents with     [facility]     Foot Problem       HISTORY OF PRESENT ILLNESS:  Breana is a 57 y.o. female who is here for the first time in our clinic with her niece.  She recently moved from [place].  She reports that occasionally she is dizzy but feels well.  She has noticed a rash on the side of her body in her foot that has been present for about a year it is very itchy.  She denies having treatment for it in the past.    REVIEW OF SYSTEMS:   General complains of dizziness  Neuro  "occasional headache noted  Skin rash as noted in the HPI  All other systems are negative.    PFSH:  Family, personal and social history reviewed and  found to be noncontributory obese  TOBACCO USE:  History   Smoking Status     Never Smoker   Smokeless Tobacco     Never Used       VITALS:  Vitals:    06/29/18 0859   BP: 166/86   Patient Site: Left Arm   Patient Position: Sitting   Cuff Size: Adult Regular   Pulse: 78   Temp: 98.3  F (36.8  C)   TempSrc: Oral   SpO2: 96%   Weight: 172 lb 14.4 oz (78.4 kg)   Height: 4' 11.5\" (1.511 m)     Wt Readings from Last 3 Encounters:   06/29/18 172 lb 14.4 oz (78.4 kg)       PHYSICAL EXAM:  Interactive obese elderly female sitting in exam room in no acute distress  Respiratory system clear to auscultation equal breath sounds no wheeze no crackles  CVS regular rate and rhythm no murmurs rubs gallops appreciated no pedal edema noted  CNS cranial 2-12 intact gait is normal reflexes are brisk motor tones normal no tremor noted  Skin there is peeling of the skin with erythema present on the left lower extremity extending from the ankle and into the dorsum and plantar aspect of the left foot.  There is a similar rash present on the left upper abdomen.  It extends up to an area approximately 3 cm inferior to the left breast the axilla is not involved there are no lesions noted.    DATA REVIEWED:  Additional History from Old Records Summarized (2): 0  Decision to Obtain Records (1): 0  Radiology Tests Summarized or Ordered (1): 0  Labs Reviewed or Ordered (1): 0  Medicine Test Summarized or Ordered (1): 0  Independent Review of EKG or X-RAY(2 each): 0    The visit lasted a total of 30 minutes face to face with the patient. Over 50% of the time was spent counseling and educating the patient about hypertension  And fungal infections.    MEDICATIONS:  Current Outpatient Prescriptions   Medication Sig Dispense Refill     fluconazole (DIFLUCAN) 150 MG tablet Take one tablet and repeat dose " after 1 week 2 tablet 0     hydroCHLOROthiazide (HYDRODIURIL) 25 MG tablet Take 1 tablet (25 mg total) by mouth daily. 30 tablet 12     No current facility-administered medications for this visit.

## 2021-06-19 NOTE — PROGRESS NOTES
"ASSESSMENT  And plan  1. Essential hypertension    Today she returns to check on her blood pressure she is been taking her hydrochlorothiazide daily in the morning, she has noted a little dizziness but not stabilized.  Her daughter reports that she has been compliant with the medication her blood pressure still elevated I am starting lisinopril recheck her blood pressure in approximately 4 weeks will be checking a BMP today also do a lipid profile and hemoglobin.    2. Dermatomyositis (H)    The rash has cleared she is used 2 doses of Diflucan the skin is no longer itchy.      There are no Patient Instructions on file for this visit.    No orders of the defined types were placed in this encounter.    There are no discontinued medications.    No Follow-up on file.    CHIEF COMPLAINT:  Chief Complaint   Patient presents with     Hypertension       HISTORY OF PRESENT ILLNESS:  Breana is a 57 y.o. female     Here for follow-up for hypertension and a fungal infection.  I saw her approximately 2 weeks ago.  She has been taking hydrochlorothiazide she finished the Diflucan.  Her daughter is here with her today.  Patient notes no shortness of breath chest pain or headache            REVIEW OF SYSTEMS:   CNS slight dizziness noted  All other 10 point review of systems are negative.    PFSH:      Lives with her family    Medical history is been reviewed    TOBACCO USE:  History   Smoking Status     Never Smoker   Smokeless Tobacco     Never Used       VITALS:  Vitals:    07/13/18 0807   BP: (!) 144/100   Pulse: 76   Resp: 14   Temp: 97.8  F (36.6  C)   TempSrc: Oral   SpO2: 95%   Weight: 171 lb 5 oz (77.7 kg)   Height: 4' 11.5\" (1.511 m)     Wt Readings from Last 3 Encounters:   07/13/18 171 lb 5 oz (77.7 kg)   06/29/18 172 lb 14.4 oz (78.4 kg)       PHYSICAL EXAM:    Interactive elderly appearing  female sitting comfortably exam room no acute distress  Respiratory system clear to auscultation equal breath sounds no wheeze " no crackles  CVS regular rate and rhythm no murmurs rubs or gallops no pedal edema  Skin warm well perfused rash is no longer present    DATA REVIEWED:  Additional History from Old Records Summarized (2): 0  Decision to Obtain Records (1): 0  Radiology Tests Summarized or Ordered (1): 0  Labs Reviewed or Ordered (1): bmp . ;ipid , hemogram  Medicine Test Summarized or Ordered (1): 0  Independent Review of EKG or X-RAY(2 each): 0    The visit lasted a total of [facility] is the most expensive minutes face to face with the patient. Over 50% of the time was spent counseling and educating the patient about hypertension    MEDICATIONS:  Current Outpatient Prescriptions   Medication Sig Dispense Refill     hydroCHLOROthiazide (HYDRODIURIL) 25 MG tablet Take 1 tablet (25 mg total) by mouth daily. 30 tablet 12     fluconazole (DIFLUCAN) 150 MG tablet Take one tablet and repeat dose after 1 week 2 tablet 0     No current facility-administered medications for this visit.         Please note that this clinical encounter uses voice recognition software, there may be typographical errors present

## 2021-06-19 NOTE — PROGRESS NOTES
"ASSESSMENT:  1. Benign essential hypertension blood pressure is adequately controlled on lisinopril hydrochlorothiazide she has been taking the medications faithfully.  Her BMP is within normal limits.  Recheck within 3-4 months.  Talked about reducing the sodium in her diet she no longer has dizziness    2. Hyperlipidemia LDL goal <130 LDL is elevated.  Discussed that today she is going to remove saturated fat from her diet as she is fond of fried foods        There are no Patient Instructions on file for this visit.    No orders of the defined types were placed in this encounter.    There are no discontinued medications.    No Follow-up on file.    CHIEF COMPLAINT:  Chief Complaint   Patient presents with     Hypertension       HISTORY OF PRESENT ILLNESS:  Breana is a 57 y.o. female     Who is here for follow-up for hypertension.  She been taking her medications faithfully she is here with her daughter.  She reports that she is doing well at home    REVIEW OF SYSTEMS:   10 point review of systems negative according the patient  All other systems are negative.    PFSH:  Not worlomh    TOBACCO USE:  History   Smoking Status     Never Smoker   Smokeless Tobacco     Never Used       VITALS:  Vitals:    08/17/18 0819   BP: 122/78   Pulse: 92   Resp: 20   Temp: 97.7  F (36.5  C)   TempSrc: Oral   SpO2: 97%   Weight: 174 lb 8 oz (79.2 kg)   Height: 4' 11.5\" (1.511 m)     Wt Readings from Last 3 Encounters:   08/17/18 174 lb 8 oz (79.2 kg)   07/13/18 171 lb 5 oz (77.7 kg)   06/29/18 172 lb 14.4 oz (78.4 kg)       PHYSICAL EXAM:  Interactive middle-aged lady sitting comfortably exam room no acute distress  CVS regular and rhythm no murmurs rubs or gallops  Respiratory system clear to auscultation equal breath sounds no wheeze no crackles    DATA REVIEWED:  Additional History from Old Records Summarized (2): 0  Decision to Obtain Records (1): 0  Radiology Tests Summarized or Ordered (1): 0  Labs Reviewed or Ordered (1): bmp " wnl cholesterol panel shows ldl 205 total cholesterol 295   Medicine Test Summarized or Ordered (1): 0  Independent Review of EKG or X-RAY(2 each): 0    The visit lasted a total of 15 minutes face to face with the patient. Over 50% of the time was spent counseling and educating the patient about huypertension and hyperlipidemia    MEDICATIONS:  Current Outpatient Prescriptions   Medication Sig Dispense Refill     hydroCHLOROthiazide (HYDRODIURIL) 25 MG tablet Take 1 tablet (25 mg total) by mouth daily. 30 tablet 12     lisinopril (PRINIVIL,ZESTRIL) 20 MG tablet Take 1 tablet (20 mg total) by mouth daily. 90 tablet 3     fluconazole (DIFLUCAN) 150 MG tablet Take one tablet and repeat dose after 1 week 2 tablet 0     No current facility-administered medications for this visit.     Please note that this clinical encounter uses voice recognition software, there may be typographical errors present

## 2021-06-20 NOTE — LETTER
Letter by Jose Geiger MD at      Author: Jose Geiger MD Service: -- Author Type: --    Filed:  Encounter Date: 7/22/2020 Status: (Other)         July 22, 2020     Patient: Breana Conner   YOB: 1960   Date of Visit: 7/22/2020       To Whom It May Concern:    It is my medical opinion that Breana Conner should remain out of work until July 30, 2020. Please excuse her absence from work on July 15, 2020 to July 29, 2020.    If you have any questions or concerns, please don't hesitate to call.    Sincerely,        Electronically signed by Jose Geiger MD

## 2021-06-20 NOTE — PROGRESS NOTES
ASSESSMENT:  1. Benign essential hypertension controlled he should continue take the hydrochlorothiazide and lisinopril.    2. Hyperlipidemia LDL goal <130 total cholesterol was almost 296.  Her triglycerides are normal recheck her cholesterol within 3 months she has already nstituted decreased fried food in her diet    3. Yeast dermatitis she developing yeast dermatitis below her left breast.  He has been itchy for a week Diflucan given.    4. Skin lesion she has a separate skin lesion with very thin plaques present on the left ankle and left leg.  She has been itching the area.  There is no blanching of the lesion.  The differential would include psoriasis.  Prescribing a medium potency corticosteroid to see if we can stop the itching I will re-visit this problem with her in 4 weeks    5. Influenza vaccination given         There are no Patient Instructions on file for this visit.    No orders of the defined types were placed in this encounter.    There are no discontinued medications.    No Follow-up on file.    CHIEF COMPLAINT:  Chief Complaint   Patient presents with     itchiness     left leg       HISTORY OF PRESENT ILLNESS:  Breana is a 58 y.o. female     Here to follow-up for hypertension her cholesterol issues and a skin lesion.  She says she has been taking the blood pressure medication.  She is reduce the salt in her diet.  She notes no side effects of the medication she is noticed that her skin has been itchy under the left breast for about a week and she is noticed that she developed a skin rash on her left leg for 2 weeks she has had this rash before and is gone away with antifungal treatment.  She denies having the rash on her leg in the past.    REVIEW OF SYSTEMS:     10 point review of systems negative except for skin please see HPI  All other systems are negative.    PFSH:    Lives with her daughter.    Currently not working    TOBACCO USE:  History   Smoking Status     Never Smoker   Smokeless  "Tobacco     Never Used       VITALS:  Vitals:    10/02/18 0846   BP: 118/74   Patient Site: Left Arm   Patient Position: Sitting   Cuff Size: Adult Regular   Pulse: 71   Temp: 97.6  F (36.4  C)   TempSrc: Oral   SpO2: 97%   Weight: 174 lb 12.8 oz (79.3 kg)   Height: 4' 11.45\" (1.51 m)     Wt Readings from Last 3 Encounters:   10/02/18 174 lb 12.8 oz (79.3 kg)   08/17/18 174 lb 8 oz (79.2 kg)   07/13/18 171 lb 5 oz (77.7 kg)       PHYSICAL EXAM:    Interactive obese female sitting in exam room no acute distress  CVS regular rate and rhythm no murmurs rubs gallops appreciated  Respiratory system clear to auscultation equal breath sounds no wheezes no crackles  ENT neck supple mucous membranes moist no lymph enlargement noted  Skin warm well perfused erythema and signs of excoriation noted with discharge present in the left inframamillary area.  Rash measures approximately 3 x 2 cm.  She has 4 distinct plaques on the dorsal aspect of the left ankle.  Signs of excoriation are present.    DATA REVIEWED:  Additional History from Old Records Summarized (2): 0  Decision to Obtain Records (1): 0  Radiology Tests Summarized or Ordered (1): 0  Labs Reviewed or Ordered (1): 0  Medicine Test Summarized or Ordered (1): 0  Independent Review of EKG or X-RAY(2 each): 0    The visit lasted a total of15 minutes face to face with the patient. Over 50% of the time was spent counseling and educating the patient about   Dermatitis, yeast infections, hyperlipidemia and hypertension.  .    MEDICATIONS:  Current Outpatient Prescriptions   Medication Sig Dispense Refill     hydroCHLOROthiazide (HYDRODIURIL) 25 MG tablet Take 1 tablet (25 mg total) by mouth daily. 30 tablet 12     lisinopril (PRINIVIL,ZESTRIL) 20 MG tablet Take 1 tablet (20 mg total) by mouth daily. 90 tablet 3     fluconazole (DIFLUCAN) 150 MG tablet Take one tablet and repeat dose after 1 week 2 tablet 0     No current facility-administered medications for this visit.  "   Please note that this clinical encounter uses voice recognition software, there may be typographical errors present

## 2021-06-21 NOTE — PROGRESS NOTES
"ASSESSMENT and plan   1. 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  She denies being treated for tuberculosis in the past she is never been hospitalized for long period of time or taken medications for long duration do a QuantiFERON test today.  - QTF-Mycobacterium tuberculosis by QuantiFERON-TB Gold Plus    2. Benign essential hypertension  Blood pressure control still not yet optimal.  Continue same dose of lisinopril recheck in 2 months as patient to regulate her sodium intake.            There are no Patient Instructions on file for this visit.    Orders Placed This Encounter   Procedures     QTF-Mycobacterium tuberculosis by QuantiFERON-TB Gold Plus     There are no discontinued medications.    No Follow-up on file.    CHIEF COMPLAINT:  Chief Complaint   Patient presents with     TB     for work       HISTORY OF PRESENT ILLNESS:  Breana is a 58 y.o. female   Is here for follow-up for her hypertension and also she wants to become a PCA.  She needs testing for that.  She is never had this job before.  She reports that she is been taking her blood pressure medication daily.  She says that she started to walk more and watch the saturated fat in her diet.    REVIEW OF SYSTEMS:   According to the patient 10 point review of systems is negative      PFSH:  Working as a PCA 4 hours/day    TOBACCO USE:  History   Smoking Status     Never Smoker   Smokeless Tobacco     Never Used       VITALS:  Vitals:    10/23/18 0839   BP: 130/80   Pulse: 70   Resp: 20   Temp: 97.5  F (36.4  C)   TempSrc: Oral   SpO2: 98%   Weight: 173 lb 6 oz (78.6 kg)   Height: 4' 11.45\" (1.51 m)     Wt Readings from Last 3 Encounters:   10/23/18 173 lb 6 oz (78.6 kg)   10/02/18 174 lb 12.8 oz (79.3 kg)   08/17/18 174 lb 8 oz (79.2 kg)       PHYSICAL EXAM:    Interactive obese middle-aged lady sitting comfortably exam room no acute distress  CVS regular rate and rhythm no murmurs rubs " gallops  Respiratory system clear to auscultation equal breath sounds no wheezes no crackles  Lymphatic system no lymph enlargement noted in her neck axillary area or supraclavicular area    DATA REVIEWED:  Additional History from Old Records Summarized (2): 00  Decision to Obtain Records (1): 0  Radiology Tests Summarized or Ordered (1): 0  Labs Reviewed or Ordered (1):   QuantiFERON  Medicine Test Summarized or Ordered (1): 0  Independent Review of EKG or X-RAY(2 each): 0    The visit lasted a total of 25 minutes face to face with the patient. Over 50% of the time was spent counseling and educating the patient about       Hypertension and latent tuberculosis.    MEDICATIONS:  Current Outpatient Prescriptions   Medication Sig Dispense Refill     fluconazole (DIFLUCAN) 150 MG tablet Take one tablet and repeat dose after 1 week 2 tablet 0     hydroCHLOROthiazide (HYDRODIURIL) 25 MG tablet Take 1 tablet (25 mg total) by mouth daily. 30 tablet 12     hydrocortisone valerate (WEST-ERMELINDA) 0.2 % ointment Apply to affected area twice daily 45 g 0     lisinopril (PRINIVIL,ZESTRIL) 20 MG tablet Take 1 tablet (20 mg total) by mouth daily. 90 tablet 3     No current facility-administered medications for this visit.         Please note that this clinical encounter uses voice recognition software, there may be typographical errors present

## 2021-06-22 NOTE — TELEPHONE ENCOUNTER
Care Guide called [facility] back to check on the status of whether they have updated the  for this patient in their system as this needed to happen in order for [facility] to resend denied claims.    Care Guide confirmed with [facility] that they do have the correct  for the patient in their system as 1960 and they report that [facility] can resend out their claims.        Care Guide reached out to [facility] and informed them that any claims that were unpaid due to the incorrect  in [facility]'s system can now be sent back to [facility] for payment.        Care Guide placed a call to [facility] with a Jossy interp ([id number]) and informed her that her  is now correct in [facility]'s system and that they should not be receiving no more [facility] bills.

## 2021-06-22 NOTE — PROGRESS NOTES
ASSESSMENT AND PLAN:  1. Benign essential hypertension blood pressures well controlled she takes lisinopril hydrochlorothiazide she is going to the bathroom 3 times at night but she says her sleep is not disturbed follow-up in 6 months she can obtain refills every month.    2. Hyperlipidemia LDL goal <130 reviewed a cholesterol panel total cholesterol is above 290 we will repeat that today because she is fasting she really has not modified her diet to a significant degree Lipid Cascade   3. Encounter for colorectal cancer screening       She is not willing to go for colonoscopy however she was amenable to the idea of a colo guard test.  Explained the procedure to her and ordered the test    Total amount time spent today the patient exceeded 25 minutes with greater than 50% was spent coordinating care and discussing the colonoscopy and the potential Colho guard test with her      Orders Placed This Encounter   Procedures     Lipid Cascade     Order Specific Question:   Fasting is required?     Answer:   Yes     Medications Discontinued During This Encounter   Medication Reason     lisinopril (PRINIVIL,ZESTRIL) 20 MG tablet Reorder     hydroCHLOROthiazide (HYDRODIURIL) 25 MG tablet Reorder       Return in about 6 months (around 6/21/2019).    CHIEF COMPLAINT:  Chief Complaint   Patient presents with     Hypertension       HISTORY OF PRESENT ILLNESS:  Breana is a 58 y.o. female who presents to the clinic today for blood pressure recheck.. Breana is present with a Jossy . I last saw her on 10/23/2018 at which time her blood pressure had been elevated. Recommendations had been given for continuing lisinopril and hydrochlorothiazide, and instructions to regulate her sodium intake. Her blood pressure has improved today. She reports to be feeling better without dizziness or headache. The patient feels that she has enough energy though is still having some urinary frequency. She has been watching her diet a little bit  "more, and takes her medications at 7am consistently. Breana has come in fasting today. 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    She has brought in a medical bill today because she thinks she is being overcharged for medical services.    From a preventative care standpoint, her colon cancer screening is due though she is reluctant to get a colonoscopy done.    REVIEW OF SYSTEMS:   General: Negative for dizziness or fatigue.  CV: Negative for chest pain.  Neuro: Negative for headache.  All other systems are negative.    PFSH:  Employed as a PCA. Reviewed as below.     TOBACCO USE:  Social History     Tobacco Use   Smoking Status Never Smoker   Smokeless Tobacco Never Used       VITALS:  Vitals:    12/21/18 0820   BP: 130/78   Pulse: 73   Resp: 16   Temp: 97.5  F (36.4  C)   TempSrc: Oral   SpO2: 97%   Weight: 175 lb 6 oz (79.5 kg)   Height: 4' 11.45\" (1.51 m)     Wt Readings from Last 3 Encounters:   12/21/18 175 lb 6 oz (79.5 kg)   10/23/18 173 lb 6 oz (78.6 kg)   10/02/18 174 lb 12.8 oz (79.3 kg)     Body mass index is 34.89 kg/m .    PHYSICAL EXAM:  General: Alert, cooperative, no distress, appears stated age  Head: Normocephalic, without obvious abnormality, atraumatic  Lungs: Clear to auscultation bilaterally, respirations unlabored  Heart: Regular rate and rhythm, S1 and S2 normal, no murmur, rub, or gallop  Neurologic:  A & O x 3.  No tremor, no focal findings.  Normal gait.     DATA REVIEWED:  Additional History from Old Records Summarized (2): none  Decision to Obtain Records (1): none  Radiology Tests Summarized or Ordered (1): none  Labs Reviewed or Ordered (1): none  Medicine Test Summarized or Ordered (1): none  Independent Review of EKG or X-RAY(2 each): none    ISameera, am scribing for and in the presence of, Dr. Geiger.    I, Dr. Geiger, personally performed the services described in this documentation, as scribed by Sameera Mota in my presence, and it is both accurate and complete. "       MEDICATIONS:  Current Outpatient Medications   Medication Sig Dispense Refill     hydroCHLOROthiazide (HYDRODIURIL) 25 MG tablet Take 1 tablet (25 mg total) by mouth daily. 30 tablet 12     lisinopril (PRINIVIL,ZESTRIL) 20 MG tablet Take 1 tablet (20 mg total) by mouth daily. 90 tablet 12     fluconazole (DIFLUCAN) 150 MG tablet Take one tablet and repeat dose after 1 week 2 tablet 0     hydrocortisone valerate (WEST-ERMELINDA) 0.2 % ointment Apply to affected area twice daily 45 g 0     hydrocortisone valerate (WEST-ERMELINDA) 0.2 % ointment APPLY TO AFFECTED AREA TWICE DAILY. 45 g 0     No current facility-administered medications for this visit.      Please note that this clinical encounter uses voice recognition software, there may be typographical errors present     Total Data Points: 0

## 2021-07-21 ENCOUNTER — TELEPHONE (OUTPATIENT)
Dept: FAMILY MEDICINE | Facility: CLINIC | Age: 61
End: 2021-07-21

## 2021-07-21 NOTE — TELEPHONE ENCOUNTER
Received incoming fax for refill on Hydrochlorothiazide 25m from [facility] pharmacy.  Patient needs to be seen before provider can fill medication due to it being over a year since last visit. Attempted to contact patient twice but no answer and voicemail is not set up.   If patient calls back please help schedule a medication follow up appt.

## 2021-08-05 ENCOUNTER — OFFICE VISIT (OUTPATIENT)
Dept: FAMILY MEDICINE | Facility: CLINIC | Age: 61
End: 2021-08-05
Payer: COMMERCIAL

## 2021-08-05 VITALS
HEIGHT: 59 IN | WEIGHT: 188.06 LBS | HEART RATE: 80 BPM | BODY MASS INDEX: 37.91 KG/M2 | TEMPERATURE: 97.7 F | RESPIRATION RATE: 18 BRPM | DIASTOLIC BLOOD PRESSURE: 94 MMHG | SYSTOLIC BLOOD PRESSURE: 150 MMHG

## 2021-08-05 DIAGNOSIS — R21 RASH AND NONSPECIFIC SKIN ERUPTION: ICD-10-CM

## 2021-08-05 DIAGNOSIS — M25.461 PAIN AND SWELLING OF RIGHT KNEE: ICD-10-CM

## 2021-08-05 DIAGNOSIS — I10 BENIGN ESSENTIAL HYPERTENSION: Primary | ICD-10-CM

## 2021-08-05 DIAGNOSIS — M25.50 MULTIPLE JOINT PAIN: ICD-10-CM

## 2021-08-05 DIAGNOSIS — M25.561 PAIN AND SWELLING OF RIGHT KNEE: ICD-10-CM

## 2021-08-05 DIAGNOSIS — E78.5 HYPERLIPIDEMIA LDL GOAL <130: ICD-10-CM

## 2021-08-05 LAB
ANION GAP SERPL CALCULATED.3IONS-SCNC: 15 MMOL/L (ref 5–18)
BUN SERPL-MCNC: 18 MG/DL (ref 8–22)
C REACTIVE PROTEIN LHE: 0.4 MG/DL (ref 0–0.8)
CALCIUM SERPL-MCNC: 9.9 MG/DL (ref 8.5–10.5)
CHLORIDE BLD-SCNC: 105 MMOL/L (ref 98–107)
CHOLEST SERPL-MCNC: 274 MG/DL
CO2 SERPL-SCNC: 24 MMOL/L (ref 22–31)
CREAT SERPL-MCNC: 0.61 MG/DL (ref 0.6–1.1)
ERYTHROCYTE [SEDIMENTATION RATE] IN BLOOD BY WESTERGREN METHOD: 28 MM/HR (ref 0–20)
FASTING STATUS PATIENT QL REPORTED: YES
GFR SERPL CREATININE-BSD FRML MDRD: >90 ML/MIN/1.73M2
GLUCOSE BLD-MCNC: 95 MG/DL (ref 70–125)
HDLC SERPL-MCNC: 59 MG/DL
LDLC SERPL CALC-MCNC: 182 MG/DL
POTASSIUM BLD-SCNC: 4.3 MMOL/L (ref 3.5–5)
RHEUMATOID FACT SER NEPH-ACNC: <15 IU/ML (ref 0–30)
SODIUM SERPL-SCNC: 144 MMOL/L (ref 136–145)
TRIGL SERPL-MCNC: 164 MG/DL
URATE SERPL-MCNC: 5.4 MG/DL (ref 2–7.5)

## 2021-08-05 PROCEDURE — 86431 RHEUMATOID FACTOR QUANT: CPT | Performed by: FAMILY MEDICINE

## 2021-08-05 PROCEDURE — 80061 LIPID PANEL: CPT | Performed by: FAMILY MEDICINE

## 2021-08-05 PROCEDURE — 99215 OFFICE O/P EST HI 40 MIN: CPT | Performed by: FAMILY MEDICINE

## 2021-08-05 PROCEDURE — 80048 BASIC METABOLIC PNL TOTAL CA: CPT | Performed by: FAMILY MEDICINE

## 2021-08-05 PROCEDURE — 36415 COLL VENOUS BLD VENIPUNCTURE: CPT | Performed by: FAMILY MEDICINE

## 2021-08-05 PROCEDURE — 84550 ASSAY OF BLOOD/URIC ACID: CPT | Performed by: FAMILY MEDICINE

## 2021-08-05 PROCEDURE — 86141 C-REACTIVE PROTEIN HS: CPT | Performed by: FAMILY MEDICINE

## 2021-08-05 PROCEDURE — 85652 RBC SED RATE AUTOMATED: CPT | Performed by: FAMILY MEDICINE

## 2021-08-05 PROCEDURE — 86200 CCP ANTIBODY: CPT | Performed by: FAMILY MEDICINE

## 2021-08-05 PROCEDURE — 86038 ANTINUCLEAR ANTIBODIES: CPT | Performed by: FAMILY MEDICINE

## 2021-08-05 PROCEDURE — 81374 HLA I TYPING 1 ANTIGEN LR: CPT | Performed by: FAMILY MEDICINE

## 2021-08-05 RX ORDER — CELECOXIB 200 MG/1
200 CAPSULE ORAL DAILY
Qty: 60 CAPSULE | Refills: 3 | Status: SHIPPED | OUTPATIENT
Start: 2021-08-05 | End: 2022-09-01

## 2021-08-05 RX ORDER — LISINOPRIL 40 MG/1
40 TABLET ORAL DAILY
Qty: 30 TABLET | Refills: 11 | Status: SHIPPED | OUTPATIENT
Start: 2021-08-05 | End: 2022-03-02

## 2021-08-05 RX ORDER — HYDROCHLOROTHIAZIDE 25 MG/1
25 TABLET ORAL DAILY
Qty: 30 TABLET | Refills: 11 | Status: SHIPPED | OUTPATIENT
Start: 2021-08-05 | End: 2021-08-05 | Stop reason: SINTOL

## 2021-08-05 RX ORDER — LISINOPRIL 20 MG/1
20 TABLET ORAL DAILY
Qty: 30 TABLET | Refills: 11 | Status: SHIPPED | OUTPATIENT
Start: 2021-08-05 | End: 2021-08-05 | Stop reason: DRUGHIGH

## 2021-08-05 ASSESSMENT — MIFFLIN-ST. JEOR: SCORE: 1335.82

## 2021-08-05 NOTE — LETTER
[facility]  [address]  [place] [address] [phone]          August 5, 2021    RE:  Breana Conner                                                                                                                                                       [address]  [place] [address]            To whom it may concern:    Breana Conner is under my professional care for    Benign essential hypertension  Hyperlipidemia LDL goal <130  Rash and nonspecific skin eruption  Multiple joint pain  Pain and swelling of right knee She  may return to work with the following: No restrictions on 9/7/2021          Sincerely,        Jose Fernandez   Phone MD

## 2021-08-05 NOTE — PROGRESS NOTES
ASSESSMENT and  Plan   1. Benign essential hypertension  Blood pressure poorly controlled discontinued hydrochlorothiazide because of issues with joint pain increasing lisinopril to 40 mg/day recheck blood pressure in 4 weeks.  - Basic metabolic panel  (Ca, Cl, CO2, Creat, Gluc, K, Na, BUN); Future  - lisinopril (ZESTRIL) 40 MG tablet; Take 1 tablet (40 mg) by mouth daily  Dispense: 30 tablet; Refill: 11  - Basic metabolic panel  (Ca, Cl, CO2, Creat, Gluc, K, Na, BUN)    2. Hyperlipidemia LDL goal <130    Patient's fasting hyperlipidemia noted in the past we will recheck lipid panel today.  - Lipid Profile (Chol, Trig, HDL, LDL calc); Future  - Lipid Profile (Chol, Trig, HDL, LDL calc)    3. Rash and nonspecific skin eruption    Rash has been present on legs knees and now spreading to forearms and hands.  The rash has been present for 9 years is increasing its itchy and is associated with plaques differential would include psoriasis, dermatitis, dermatomycosis.  - ESR: Erythrocyte sedimentation rate; Future  - CRP, inflammation; Future  - XR Knee Standing Right 2 Views; Future  - ESR: Erythrocyte sedimentation rate  - CRP, inflammation    4. Multiple joint pain    Has joint pain has been increasing over the last year.  She is requesting time off for work.  She noted joint pain in her right ankle right foot.  Both areas are swollen she has tenderness in swelling in her right knee.  She has a joint effusion present.  She has degenerative arthritis present in medial and lateral compartment Celebrex given for discomfort.  Lab tests conducted today follow-up in 4 weeks I will inform of results by phone.  - Uric acid; Future  - Rheumatoid factor; Future  - celecoxib (CELEBREX) 200 MG capsule; Take 1 capsule (200 mg) by mouth daily  Dispense: 60 capsule; Refill: 3  - Anti Nuclear Deepika IgG by IFA with Reflex; Future  - HLA-B27 Typing; Future  - Uric acid  - Rheumatoid factor  - Anti Nuclear Deepika IgG by IFA with Reflex  -  HLA-B27 Typing    5. Pain and swelling of right knee    Swelling and tenderness noted in the right knee erythema is noted over the right patella.  Today's x-rays personally reviewed by myself.  There is no evidence of a fracture.  Celebrex given for pain.  - Cyclic Citrullinated Peptide Antibody IgG; Future  - XR Knee Standing Right 2 Views; Future  - Anti Nuclear Deepika IgG by IFA with Reflex; Future  - Cyclic Citrullinated Peptide Antibody IgG  - Anti Nuclear Deepika IgG by IFA with Reflex      There are no Patient Instructions on file for this visit.    Orders Placed This Encounter   Procedures     XR Knee Standing Right 2 Views     Basic metabolic panel  (Ca, Cl, CO2, Creat, Gluc, K, Na, BUN)     Lipid Profile (Chol, Trig, HDL, LDL calc)     Uric acid     Rheumatoid factor     Cyclic Citrullinated Peptide Antibody IgG     ESR: Erythrocyte sedimentation rate     CRP, inflammation     Anti Nuclear Deepika IgG by IFA with Reflex     HLA-B27 Typing     Medications Discontinued During This Encounter   Medication Reason     hydrochlorothiazide (HYDRODIURIL) 25 MG tablet Reorder     lisinopril (ZESTRIL) 20 MG tablet Reorder     hydrochlorothiazide (HYDRODIURIL) 25 MG tablet Side effects     lisinopril (ZESTRIL) 20 MG tablet Dose adjustment       No follow-ups on file.    CHIEF COMPLAINT:  chief complaint    HISTORY OF PRESENT ILLNESS:  Breana is a 61 year old female who is here to follow-up for hypertension and has questions regarding the rash and joint pain and knee pain.  She reports that she has been out of her medication for some time and she has been unable to walk normally because she has been experiencing joint pain.  The joint pain first started in her right foot and spread to her right ankle and now is affecting her right knee.  She says her right knee is swollen she had difficulty standing on it.  She has joint pain is been present for about a year but is been worsening.  She states that she also has had a rash that she is  "noted since she moved to [place] and that is been present for about 9 years she said the rash was first on her legs and now is spreading to her arms.  She says it is not very itchy but it is associated with skin lesions.  She denies having a family history of rashes or joint pain.  She denies any fever back pain nausea or headaches.    REVIEW OF SYSTEMS:   Musculoskeletal positive for joint pain as noted in the HPI over the right knee right ankle right foot and left ankle  Skin positive for rash over both forearms both legs on her right knee, redness noted over the right foot and ankle  12 point review of  All other systems are negative.    PFSH:  She works in a factory  TOBACCO USE:  History   Smoking Status     Never Smoker   Smokeless Tobacco     Never Used       VITALS:  Vitals:    08/05/21 0822 08/05/21 0901   BP: (!) 156/104 (!) 150/94   Pulse: 80    Resp: 18    Temp: 97.7  F (36.5  C)    TempSrc: Oral    Weight: 85.3 kg (188 lb 1 oz)    Height: 1.51 m (4' 11.45\")      Wt Readings from Last 3 Encounters:   08/05/21 85.3 kg (188 lb 1 oz)   09/09/20 83 kg (183 lb)   08/12/20 83.8 kg (184 lb 11.2 oz)       PHYSICAL EXAM:  Interactive obese female sitting in exam room no acute distress  HEENT neck supple mucous membranes moist, oral cavity shows no exudate no erythema.  No evidence or rash over the face there is no sinus tenderness there is no lymph node enlargement noted in the neck  Respiratory system clear to auscultation equal breath sounds no wheezes no crackles  CVS regular rate and rhythm no murmurs no rubs no gallops peripheral pulses are good  Skin she has a dye fluid rash present on her forearms with several small plaques present on her left and right forearms.  She has multiple areas of erythema present on her right foot right ankle and right lower leg there are 3 isolated plaques present on her right knee and right ankle.  She has a flat day fluid rash present over the left lower leg.  No signs of " excoriation are noted.  There is no pitting of the fingernails or toenails noted  Musculoskeletal swelling noted over the right knee it is tender to palpation, there is swelling noted on the right ankle and over the right foot.  The first MTP joint is not swollen.  Abdomen is soft there is no focal tenderness no hepatosplenomegaly bowel sounds are present  Neuro cranial nerves II to XII intact.  Motor tone the lower extremities is normal.  Gross digital sensation over the right foot is normal.    DATA REVIEWED:  Additional History from Old Records Summarized (2): 0  Decision to Obtain Records (1): 0  Radiology Tests Summarized or Ordered (1): 0  Labs Reviewed or Ordered (1): 0  Medicine Test Summarized or Ordered (1): 0  Independent Review of EKG or X-RAY(2 each): 0    The visit lasted a total of 40 minutes .    MEDICATIONS:  Current Outpatient Medications   Medication Sig Dispense Refill     celecoxib (CELEBREX) 200 MG capsule Take 1 capsule (200 mg) by mouth daily 60 capsule 3     lisinopril (ZESTRIL) 40 MG tablet Take 1 tablet (40 mg) by mouth daily 30 tablet 11

## 2021-08-06 DIAGNOSIS — Z53.9 ERRONEOUS ENCOUNTER--DISREGARD: Primary | ICD-10-CM

## 2021-08-06 DIAGNOSIS — I10 BENIGN ESSENTIAL HYPERTENSION: ICD-10-CM

## 2021-08-06 RX ORDER — LISINOPRIL 20 MG/1
20 TABLET ORAL
COMMUNITY
Start: 2020-08-12 | End: 2021-09-29

## 2021-08-09 LAB — ANA SER QL IF: NEGATIVE

## 2021-08-10 ENCOUNTER — PATIENT OUTREACH (OUTPATIENT)
Dept: FAMILY MEDICINE | Facility: CLINIC | Age: 61
End: 2021-08-10

## 2021-08-10 LAB
B LOCUS: NORMAL
B27TEST METHOD: NORMAL
CCP AB SER IA-ACNC: 2.2 U/ML

## 2021-08-25 ENCOUNTER — OFFICE VISIT (OUTPATIENT)
Dept: FAMILY MEDICINE | Facility: CLINIC | Age: 61
End: 2021-08-25
Payer: COMMERCIAL

## 2021-08-25 VITALS
HEIGHT: 59 IN | TEMPERATURE: 98 F | HEART RATE: 90 BPM | OXYGEN SATURATION: 98 % | DIASTOLIC BLOOD PRESSURE: 84 MMHG | BODY MASS INDEX: 36.77 KG/M2 | RESPIRATION RATE: 18 BRPM | WEIGHT: 182.38 LBS | SYSTOLIC BLOOD PRESSURE: 150 MMHG

## 2021-08-25 DIAGNOSIS — E78.5 HYPERLIPIDEMIA LDL GOAL <130: ICD-10-CM

## 2021-08-25 DIAGNOSIS — I10 BENIGN ESSENTIAL HYPERTENSION: ICD-10-CM

## 2021-08-25 DIAGNOSIS — M25.50 MULTIPLE JOINT PAIN: Primary | ICD-10-CM

## 2021-08-25 PROCEDURE — 99214 OFFICE O/P EST MOD 30 MIN: CPT | Performed by: FAMILY MEDICINE

## 2021-08-25 RX ORDER — ATORVASTATIN CALCIUM 20 MG/1
20 TABLET, FILM COATED ORAL DAILY
Qty: 30 TABLET | Refills: 3 | Status: SHIPPED | OUTPATIENT
Start: 2021-08-25 | End: 2022-01-03

## 2021-08-25 ASSESSMENT — MIFFLIN-ST. JEOR: SCORE: 1310.02

## 2021-08-25 NOTE — PROGRESS NOTES
ASSESSMENT and plan   1. Multiple joint pain  Joint pain is improved she has no swelling in her knees the swelling in her forearms is decreased.  She is taking the Celebrex no side effects noted.  She would like to return to work I have filled out her paperwork and she can return on September 7.    2. Hyperlipidemia LDL goal <130    Cholesterol found to be grossly elevated she needs to be on a statin if started on atorvastatin I will have her follow-up in 4 weeks side effects of medication discussed in detail  - atorvastatin (LIPITOR) 20 MG tablet; Take 1 tablet (20 mg) by mouth daily  Dispense: 30 tablet; Refill: 3    3. Benign essential hypertension    Blood pressure is not at goal continues lisinopril advocated sodium restriction.  Recheck blood pressure in 4 weeks      There are no Patient Instructions on file for this visit.    No orders of the defined types were placed in this encounter.    There are no discontinued medications.    Return in about 4 weeks (around 9/22/2021) for hypertension.    CHIEF COMPLAINT:  chief complaint    HISTORY OF PRESENT ILLNESS:  Breana is a 61 year old female who is returning today after approximately 20 days.  She is following up for joint pain skin rash and hypertension.  She reports that changing the medication did help that she was able to benefit from the Celebrex and her joint pain and knee pain decreased she no longer has a rash she feels better she got her with call from work and would like to return within 10 days.  She reports that she is no longer feeling tired.  She is taking her blood pressure medication faithfully.    REVIEW OF SYSTEMS:     Skin positive for decreased rash  Musculoskeletal some joint pain noted only in her shoulders  10 point review of  All other systems are negative.    PFSH:    Social history reviewed    TOBACCO USE:  History   Smoking Status     Never Smoker   Smokeless Tobacco     Never Used       VITALS:  Vitals:    08/25/21 0821 08/25/21 0842  "  BP: (!) 158/90 (!) 150/84   Pulse: 90    Resp: 18    Temp: 98  F (36.7  C)    TempSrc: Oral    SpO2: 98%    Weight: 82.7 kg (182 lb 6 oz)    Height: 1.51 m (4' 11.45\")      Wt Readings from Last 3 Encounters:   08/25/21 82.7 kg (182 lb 6 oz)   08/05/21 85.3 kg (188 lb 1 oz)   09/09/20 83 kg (183 lb)       PHYSICAL EXAM:    Interactive middle-aged lady sitting in exam room in no acute distress  HEENT neck supple mucous members moist with no lymph enlargement noted in the neck  Respiratory system clear to auscultation equal breath sounds no wheeze no crackles  CVS regular rate and rhythm no murmurs no rubs no gallops no pedal edema  Musculoskeletal system no tenderness elicited when I palpate her shoulders elbow joints or wrists or knees.  Derm no evidence of any excoriation of present on her forearms she has some erythema present on her legs bilaterally.    DATA REVIEWED:  Additional History from Old Records Summarized (2): 0  Decision to Obtain Records (1): 0  Radiology Tests Summarized or Ordered (1): 0  Labs Reviewed or Ordered (1): 0  Medicine Test Summarized or Ordered (1): 0  Independent Review of EKG or X-RAY(2 each): 0    The visit lasted a total of 30 minutes .    MEDICATIONS:  Current Outpatient Medications   Medication Sig Dispense Refill     atorvastatin (LIPITOR) 20 MG tablet Take 1 tablet (20 mg) by mouth daily 30 tablet 3     celecoxib (CELEBREX) 200 MG capsule Take 1 capsule (200 mg) by mouth daily 60 capsule 3     lisinopril (ZESTRIL) 40 MG tablet Take 1 tablet (40 mg) by mouth daily 30 tablet 11     lisinopril (ZESTRIL) 20 MG tablet Take 20 mg by mouth         "

## 2021-09-29 ENCOUNTER — OFFICE VISIT (OUTPATIENT)
Dept: FAMILY MEDICINE | Facility: CLINIC | Age: 61
End: 2021-09-29
Payer: COMMERCIAL

## 2021-09-29 VITALS
RESPIRATION RATE: 18 BRPM | BODY MASS INDEX: 37.29 KG/M2 | DIASTOLIC BLOOD PRESSURE: 96 MMHG | WEIGHT: 185 LBS | SYSTOLIC BLOOD PRESSURE: 158 MMHG | TEMPERATURE: 97.6 F | OXYGEN SATURATION: 97 % | HEIGHT: 59 IN | HEART RATE: 65 BPM

## 2021-09-29 DIAGNOSIS — Z23 NEED FOR IMMUNIZATION AGAINST INFLUENZA: ICD-10-CM

## 2021-09-29 DIAGNOSIS — I10 BENIGN ESSENTIAL HYPERTENSION: Primary | ICD-10-CM

## 2021-09-29 DIAGNOSIS — E78.5 HYPERLIPIDEMIA LDL GOAL <130: ICD-10-CM

## 2021-09-29 DIAGNOSIS — M25.50 MULTIPLE JOINT PAIN: ICD-10-CM

## 2021-09-29 DIAGNOSIS — E66.01 MORBID OBESITY (H): ICD-10-CM

## 2021-09-29 PROCEDURE — 90471 IMMUNIZATION ADMIN: CPT | Performed by: FAMILY MEDICINE

## 2021-09-29 PROCEDURE — 99214 OFFICE O/P EST MOD 30 MIN: CPT | Mod: 25 | Performed by: FAMILY MEDICINE

## 2021-09-29 PROCEDURE — 90682 RIV4 VACC RECOMBINANT DNA IM: CPT | Performed by: FAMILY MEDICINE

## 2021-09-29 RX ORDER — AMLODIPINE BESYLATE 5 MG/1
5 TABLET ORAL DAILY
Qty: 30 TABLET | Refills: 11 | Status: SHIPPED | OUTPATIENT
Start: 2021-09-29 | End: 2022-02-02

## 2021-09-29 ASSESSMENT — MIFFLIN-ST. JEOR: SCORE: 1321.92

## 2021-09-29 NOTE — LETTER
[facility]  [address]  [place] [address]  Phone: [phone]  Fax: [phone]    September 29, 2021        Breana Conner  [address]  [place] [address]          To whom it may concern:    RE: Breana COOPER Dalila    Breana Conner was seen in clinic today. May return to work 9/30/2021.    Please contact me for questions or concerns.      Sincerely,        Jose Geiger MD

## 2021-09-29 NOTE — PROGRESS NOTES
"ASSESSMENT and plan   1. Benign essential hypertension  Pressures not well controlled she has been taking lisinopril no side effects noted.  Starting Norvasc follow-up in 2 months side effects of medication discussed  - amLODIPine (NORVASC) 5 MG tablet; Take 1 tablet (5 mg) by mouth daily  Dispense: 30 tablet; Refill: 11    2. Morbid obesity (H)    No other joint pain is decreased she will attempt to walk daily she is watching her dietary intake and watch her sodium    3. Hyperlipidemia LDL goal <130    Taking her statin no side effects noted    4. Multiple joint pain    She cannot take Celebrex only as needed she has joint pain which is episodic and she is return to work without difficulty      5.  Immunization against influenza  There are no Patient Instructions on file for this visit.    No orders of the defined types were placed in this encounter.    Medications Discontinued During This Encounter   Medication Reason     lisinopril (ZESTRIL) 20 MG tablet        Return in about 2 months (around 11/29/2021) for hypertension.    CHIEF COMPLAINT:  chief complaint    HISTORY OF PRESENT ILLNESS:  Breana is a 61 year old female who is here for a follow-up she brought her medications in.  She reports that she feels well and is returned to work her joint pain is minimal and she is wondering if she needs to take her pain medication daily.  She denies any shortness of breath chest pain muscle ache or fatigue    REVIEW OF SYSTEMS:     According to patient 10 point review  All other systems are negative.    PFSH:    Social history reviewed    TOBACCO USE:  History   Smoking Status     Never Smoker   Smokeless Tobacco     Never Used       VITALS:  Vitals:    09/29/21 0958   BP: (!) 146/84   Pulse: 65   Resp: 18   Temp: 97.6  F (36.4  C)   TempSrc: Oral   SpO2: 97%   Weight: 83.9 kg (185 lb)   Height: 1.51 m (4' 11.45\")     Wt Readings from Last 3 Encounters:   09/29/21 83.9 kg (185 lb)   08/25/21 82.7 kg (182 lb 6 oz)   08/05/21 " 85.3 kg (188 lb 1 oz)       PHYSICAL EXAM:    Interactive lady sitting in exam room no acute distress  HEENT neck supple mucous members moist.  Respiratory system clear to auscultation good breath sounds no wheeze no crackles  CVS regular rate and rhythm no murmurs rubs gallops appreciated no pedal edema noted  Musculoskeletal system no tenderness elicited when I palpate her elbows knees and wrists bilaterally    DATA REVIEWED:  Additional History from Old Records Summarized (2): 0  Decision to Obtain Records (1): 0  Radiology Tests Summarized or Ordered (1): 0  Labs Reviewed or Ordered (1): 0  Medicine Test Summarized or Ordered (1): 0  Independent Review of EKG or X-RAY(2 each): 0    The visit lasted a total of 30 minutes .    MEDICATIONS:  Current Outpatient Medications   Medication Sig Dispense Refill     amLODIPine (NORVASC) 5 MG tablet Take 1 tablet (5 mg) by mouth daily 30 tablet 11     atorvastatin (LIPITOR) 20 MG tablet Take 1 tablet (20 mg) by mouth daily 30 tablet 3     celecoxib (CELEBREX) 200 MG capsule Take 1 capsule (200 mg) by mouth daily 60 capsule 3     lisinopril (ZESTRIL) 40 MG tablet Take 1 tablet (40 mg) by mouth daily 30 tablet 11

## 2022-01-03 DIAGNOSIS — Z76.0 ENCOUNTER FOR MEDICATION REFILL: Primary | ICD-10-CM

## 2022-01-03 DIAGNOSIS — E78.5 HYPERLIPIDEMIA LDL GOAL <130: ICD-10-CM

## 2022-01-03 RX ORDER — ATORVASTATIN CALCIUM 20 MG/1
20 TABLET, FILM COATED ORAL DAILY
Qty: 30 TABLET | Refills: 3 | Status: SHIPPED | OUTPATIENT
Start: 2022-01-03 | End: 2022-02-02

## 2022-01-03 NOTE — TELEPHONE ENCOUNTER
Medication Question or Refill    Who is calling: Amber    What medication are you calling about (include dose and sig)?: Atorvastatin 20 mg take daily    Controlled Substance Agreement on file: No    Who prescribed the medication?: PCP    Do you need a refill? Yes:     When did you use the medication last?  This week    Patient offered an appointment? No, has upcoming appt on 2/2/22    Do you have any questions or concerns?  No    Okay to leave a detailed message?: Yes at Home number on file [phone] (home)    Call taken on 1/3/22 at 4 pm by Lorna Prasad CMA. CMT

## 2022-02-02 ENCOUNTER — OFFICE VISIT (OUTPATIENT)
Dept: FAMILY MEDICINE | Facility: CLINIC | Age: 62
End: 2022-02-02
Payer: COMMERCIAL

## 2022-02-02 VITALS
TEMPERATURE: 97.7 F | SYSTOLIC BLOOD PRESSURE: 168 MMHG | OXYGEN SATURATION: 98 % | WEIGHT: 188.38 LBS | BODY MASS INDEX: 37.98 KG/M2 | HEIGHT: 59 IN | HEART RATE: 69 BPM | DIASTOLIC BLOOD PRESSURE: 90 MMHG

## 2022-02-02 DIAGNOSIS — E78.5 HYPERLIPIDEMIA LDL GOAL <130: ICD-10-CM

## 2022-02-02 DIAGNOSIS — Z76.0 ENCOUNTER FOR MEDICATION REFILL: ICD-10-CM

## 2022-02-02 DIAGNOSIS — I10 BENIGN ESSENTIAL HYPERTENSION: Primary | ICD-10-CM

## 2022-02-02 PROCEDURE — 99214 OFFICE O/P EST MOD 30 MIN: CPT | Performed by: FAMILY MEDICINE

## 2022-02-02 RX ORDER — ATORVASTATIN CALCIUM 20 MG/1
20 TABLET, FILM COATED ORAL DAILY
Qty: 30 TABLET | Refills: 3 | Status: SHIPPED | OUTPATIENT
Start: 2022-02-02 | End: 2022-05-31

## 2022-02-02 RX ORDER — AMLODIPINE BESYLATE 10 MG/1
10 TABLET ORAL DAILY
Qty: 30 TABLET | Refills: 11 | Status: SHIPPED | OUTPATIENT
Start: 2022-02-02 | End: 2022-08-10

## 2022-02-02 ASSESSMENT — MIFFLIN-ST. JEOR: SCORE: 1332.23

## 2022-02-02 NOTE — PROGRESS NOTES
ASSESSMENT and plan   1. Benign essential hypertension    Blood pressure is not well controlled.  She states she is cooking with less salt.  She has been taking loaded pain in the lisinopril.  I have increased her amlodipine to 10 mg a day if side effects discussed follow-up in 3 to 4 weeks she knows that she has to continue taking the lisinopril.  She is also requesting classes and states that she needs to apply for citizenship and is wondering if that can help her passing this exam   - amLODIPine (NORVASC) 10 MG tablet; Take 1 tablet (10 mg) by mouth daily  Dispense: 30 tablet; Refill: 11  - Care Coordination Referral; Future    2. Hyperlipidemia LDL goal <130    She was prescribed atorvastatin I do not see the prescription I refilled it I will recheck a cholesterol in 6 months  - atorvastatin (LIPITOR) 20 MG tablet; Take 1 tablet (20 mg) by mouth daily  Dispense: 30 tablet; Refill: 3    3. Encounter for medication refill    - atorvastatin (LIPITOR) 20 MG tablet; Take 1 tablet (20 mg) by mouth daily  Dispense: 30 tablet; Refill: 3      There are no Patient Instructions on file for this visit.    Orders Placed This Encounter   Procedures     Care Coordination Referral     Medications Discontinued During This Encounter   Medication Reason     amLODIPine (NORVASC) 5 MG tablet Reorder     atorvastatin (LIPITOR) 20 MG tablet Reorder       Return in about 4 weeks (around 3/2/2022) for hypertension.    CHIEF COMPLAINT:  chief complaint    HISTORY OF PRESENT ILLNESS:  Breana is a 62 year old female   Who is here for a follow-up for hypertension she has also has questions around citizenship.  Apparently she applied last May but is never heard anything back there is no mention of a neuropsych eval.  Given that she can speak some English I think she would be a good candidate for the attending English classes she is interested in doing that.  She says been taking the medication for hypertension she takes lisinopril and  "amlodipine no side effects noted she says she feels well no headaches no dizziness noted.    REVIEW OF SYSTEMS:     10 point review of systems negative      PFSH:    Social history reviewed    TOBACCO USE:  History   Smoking Status     Never Smoker   Smokeless Tobacco     Never Used       VITALS:  Vitals:    02/02/22 1450   BP: (!) 162/90   Pulse: 69   Temp: 97.7  F (36.5  C)   TempSrc: Oral   SpO2: 98%   Weight: 85.4 kg (188 lb 6 oz)   Height: 1.51 m (4' 11.45\")     Wt Readings from Last 3 Encounters:   02/02/22 85.4 kg (188 lb 6 oz)   09/29/21 83.9 kg (185 lb)   08/25/21 82.7 kg (182 lb 6 oz)       PHYSICAL EXAM:  Interactive female sitting comfortably in the examroom no acute distress she has been playing on the keto   HEENT neck supple mucous members moist oral cavity shows no exit erythema  Respiratory system clear to auscultation equal breath sounds no wheeze no crackles except CVS regular rate and rhythm no murmurs rubs gallops appreciated  Abdomen soft no focal tenderness bowel sounds are present  CVS regular rate rhythm no murmurs rubs gallops appreciated no pedal edema    DATA REVIEWED:  Additional History from Old Records Summarized (2): 0  Decision to Obtain Records (1): 0  Radiology Tests Summarized or Ordered (1): 0  Labs Reviewed or Ordered (1): 0  Medicine Test Summarized or Ordered (1): 0  Independent Review of EKG or X-RAY(2 each): 0    The visit lasted a total of 30 minutes .    MEDICATIONS:  Current Outpatient Medications   Medication Sig Dispense Refill     amLODIPine (NORVASC) 10 MG tablet Take 1 tablet (10 mg) by mouth daily 30 tablet 11     atorvastatin (LIPITOR) 20 MG tablet Take 1 tablet (20 mg) by mouth daily 30 tablet 3     celecoxib (CELEBREX) 200 MG capsule Take 1 capsule (200 mg) by mouth daily 60 capsule 3     lisinopril (ZESTRIL) 40 MG tablet Take 1 tablet (40 mg) by mouth daily 30 tablet 11       "

## 2022-02-03 ENCOUNTER — PATIENT OUTREACH (OUTPATIENT)
Dept: NURSING | Facility: CLINIC | Age: 62
End: 2022-02-03
Payer: COMMERCIAL

## 2022-02-03 NOTE — PROGRESS NOTES
Clinic Care Coordination Contact  Community Health Worker Initial Outreach    CHW Initial Information Gathering:  Referral Source: PCP  Preferred Hospital: [facility]  [phone]  Preferred Urgent Care: [facility] [place], [phone]  Current living arrangement:: I live in a private home with family  Type of residence:: Private home - stairs  Community Resources: None  Supplies used at home:: None  Equipment Currently Used at Home: none  Informal Support system:: Family,Friends  No PCP office visit in Past Year: No  Transportation means:: Medical transport  CHW Additional Questions  If ED/Hospital discharge, follow-up appointment scheduled as recommended?: N/A  Medication changes made following ED/Hospital discharge?: N/A  MyChart active?: No  Patient agreeable to assistance with activating MyChart?: No    Patient accepts CC: No, patient declined. Patient will be sent Care Coordination introduction letter for future reference.     CCC received referral from PCP to assist with resources for citizenship classes. Patient speaks some English, not eligible for waiver, per PCP notes and patient agreed to proceed with citizenship classes before pursuing for medical waiver. CHW provided patient resources to contact [employer] person who provide free citizenship classes.

## 2022-03-02 ENCOUNTER — OFFICE VISIT (OUTPATIENT)
Dept: FAMILY MEDICINE | Facility: CLINIC | Age: 62
End: 2022-03-02
Payer: COMMERCIAL

## 2022-03-02 VITALS
HEIGHT: 59 IN | WEIGHT: 190.31 LBS | TEMPERATURE: 97.5 F | BODY MASS INDEX: 38.36 KG/M2 | OXYGEN SATURATION: 96 % | SYSTOLIC BLOOD PRESSURE: 150 MMHG | HEART RATE: 92 BPM | DIASTOLIC BLOOD PRESSURE: 90 MMHG

## 2022-03-02 DIAGNOSIS — Z13.9 SCREENING FOR CONDITION: ICD-10-CM

## 2022-03-02 DIAGNOSIS — Z11.4 SCREENING FOR HIV (HUMAN IMMUNODEFICIENCY VIRUS): Primary | ICD-10-CM

## 2022-03-02 DIAGNOSIS — I10 BENIGN ESSENTIAL HYPERTENSION: ICD-10-CM

## 2022-03-02 LAB
HCV AB SERPL QL IA: NEGATIVE
HIV 1+2 AB+HIV1 P24 AG SERPL QL IA: NEGATIVE

## 2022-03-02 PROCEDURE — 99214 OFFICE O/P EST MOD 30 MIN: CPT | Performed by: FAMILY MEDICINE

## 2022-03-02 PROCEDURE — 86803 HEPATITIS C AB TEST: CPT | Performed by: FAMILY MEDICINE

## 2022-03-02 PROCEDURE — 36415 COLL VENOUS BLD VENIPUNCTURE: CPT | Performed by: FAMILY MEDICINE

## 2022-03-02 PROCEDURE — 87389 HIV-1 AG W/HIV-1&-2 AB AG IA: CPT | Performed by: FAMILY MEDICINE

## 2022-03-02 RX ORDER — LISINOPRIL 40 MG/1
40 TABLET ORAL DAILY
Qty: 30 TABLET | Refills: 11 | Status: SHIPPED | OUTPATIENT
Start: 2022-03-02 | End: 2022-08-10

## 2022-03-02 RX ORDER — HYDROCHLOROTHIAZIDE 25 MG/1
12.5 TABLET ORAL DAILY
Qty: 30 TABLET | Refills: 11 | Status: SHIPPED | OUTPATIENT
Start: 2022-03-02 | End: 2022-08-10

## 2022-03-02 NOTE — LETTER
[address]  [place] [address]  Phone: [phone]  Fax: [phone]    March 2, 2022        Breana Conner  [address]  [place] [address]          To whom it may concern:    RE: Breana Conner    Patient was seen and treated today at our clinic. Please excuse her absence from work on 03/02/2022.  She may return to work on 03/03/2022.    Please contact me for questions or concerns.      Sincerely,        Jose Geiger MD

## 2022-03-02 NOTE — PROGRESS NOTES
ASSESSMENT  And plan  1. Screening for HIV (human immunodeficiency virus)  Patient agrees for test  - HIV Antigen Antibody Combo; Future    2. Screening for condition  She agrees to be screened today  - Hepatitis C antibody; Future    3. Benign essential hypertension    Her blood pressure still elevated.  We discussed how she was taking her medications and she seemed to have some misunderstanding of previous instructions.  She has been taking amlodipine up to 4 tablets a day and noticed swelling in her legs and then stop the medication she restarted it 1 tablet a day last week and is taking lisinopril correctly because her blood pressure is elevated I am placing on hydrochlorothiazide she understands that she is only to take 1 tablet they will see her again in 2 months.  She understands if she notes any side effects she is to call me again immediately      There are no Patient Instructions on file for this visit.    Orders Placed This Encounter   Procedures     REVIEW OF HEALTH MAINTENANCE PROTOCOL ORDERS     HIV Antigen Antibody Combo     Hepatitis C antibody     There are no discontinued medications.    Follow-up in 2 months    CHIEF COMPLAINT:  chief complaint follow-up for blood pressure    HISTORY OF PRESENT ILLNESS:  Breana is a 62 year old female is here today to follow-up for hypertension she brought her medications in.  She says she has some side effects from one of her medications.  She states that she took the amlodipine twice in the morning and twice at night and noticed after a week and a half that she had swelling mainly in her legs which was very painful since she stopped the medication altogether there the instructions on the bottle and she relates she was taking extra dose of the medication and now started the medicine for a week it 1 tablet a day.  She continues to take lisinopril.    REVIEW OF SYSTEMS:   Other than noted in the HPI 10 point review of  All other systems are negative.    PFSH:  Social  "history reviewed    TOBACCO USE:  History   Smoking Status     Never Smoker   Smokeless Tobacco     Never Used       VITALS:  Vitals:    03/02/22 0942   BP: (!) 146/74   Pulse: 92   Temp: 97.5  F (36.4  C)   TempSrc: Oral   SpO2: 96%   Weight: 86.3 kg (190 lb 5 oz)   Height: 1.51 m (4' 11.45\")     Wt Readings from Last 3 Encounters:   03/02/22 86.3 kg (190 lb 5 oz)   02/02/22 85.4 kg (188 lb 6 oz)   09/29/21 83.9 kg (185 lb)       PHYSICAL EXAM:  Interactive middle-aged female sitting comfortably in exam room no acute distress  HEENT neck supple mucous members moist oral cavity shows no exudate erythema  Respiratory system clear to auscultation equal breath sounds no wheeze no crackles  CVS regular rate and rhythm no murmurs rubs gallops appreciated there is +1 nonpitting edema noted in the left ankle only  Psych oriented x3 not agitated well-groomed    DATA REVIEWED:  Additional History from Old Records Summarized (2): 0  Decision to Obtain Records (1): 0  Radiology Tests Summarized or Ordered (1): 0  Labs Reviewed or Ordered (1): 0  Medicine Test Summarized or Ordered (1): 0  Independent Review of EKG or X-RAY(2 each): 0    The visit lasted a total of 30 minutes .    MEDICATIONS:  Current Outpatient Medications   Medication Sig Dispense Refill     amLODIPine (NORVASC) 10 MG tablet Take 1 tablet (10 mg) by mouth daily 30 tablet 11     atorvastatin (LIPITOR) 20 MG tablet Take 1 tablet (20 mg) by mouth daily 30 tablet 3     lisinopril (ZESTRIL) 40 MG tablet Take 1 tablet (40 mg) by mouth daily 30 tablet 11     celecoxib (CELEBREX) 200 MG capsule Take 1 capsule (200 mg) by mouth daily 60 capsule 3       "

## 2022-05-03 ENCOUNTER — OFFICE VISIT (OUTPATIENT)
Dept: FAMILY MEDICINE | Facility: CLINIC | Age: 62
End: 2022-05-03
Payer: COMMERCIAL

## 2022-05-03 VITALS
SYSTOLIC BLOOD PRESSURE: 134 MMHG | TEMPERATURE: 98.3 F | HEART RATE: 114 BPM | BODY MASS INDEX: 35.14 KG/M2 | OXYGEN SATURATION: 96 % | HEIGHT: 60 IN | DIASTOLIC BLOOD PRESSURE: 72 MMHG | WEIGHT: 179 LBS

## 2022-05-03 DIAGNOSIS — R21 RASH AND NONSPECIFIC SKIN ERUPTION: Primary | ICD-10-CM

## 2022-05-03 DIAGNOSIS — Z23 ENCOUNTER FOR IMMUNIZATION: ICD-10-CM

## 2022-05-03 DIAGNOSIS — Z12.11 SCREEN FOR COLON CANCER: ICD-10-CM

## 2022-05-03 DIAGNOSIS — E78.5 HYPERLIPIDEMIA LDL GOAL <130: ICD-10-CM

## 2022-05-03 DIAGNOSIS — I10 BENIGN ESSENTIAL HYPERTENSION: ICD-10-CM

## 2022-05-03 PROCEDURE — 0064A COVID-19,PF,MODERNA (18+ YRS BOOSTER .25ML): CPT | Performed by: FAMILY MEDICINE

## 2022-05-03 PROCEDURE — 91306 COVID-19,PF,MODERNA (18+ YRS BOOSTER .25ML): CPT | Performed by: FAMILY MEDICINE

## 2022-05-03 PROCEDURE — 99214 OFFICE O/P EST MOD 30 MIN: CPT | Mod: 25 | Performed by: FAMILY MEDICINE

## 2022-05-03 NOTE — LETTER
May 3, 2022      Breana COOPER Dalila  [address]  [place] [address]        To Whom It May Concern:    Breana COOPER Dalila  was seen on 05/03/2022.  Please excuse her absence from work today.        Sincerely,        Jose Geiger MD

## 2022-05-03 NOTE — PROGRESS NOTES
ASSESSMENT and plan   1. Screen for colon cancer    Agrees for test  - COLOGUARD([employer] SCIENCES)    2. Rash and nonspecific skin eruption    Expansive rash on her arms chest wall legs has been present for more than 4 years currently patient mentions that the rash is better and still looks tired irritative and erythematous.  No plaques noted.  Differential is broad and would include dermato mycosis, psoriasis, drug insensitivity food insensitivity she needs to see dermatology she reports that currently the rash is better but she has not been using any medication for it  - Adult Dermatology Referral; Future    3. Hyperlipidemia LDL goal <130    Taking a statin no side effects noted    4. Benign essential hypertension    Blood pressure well controlled taking all 3 antihypertensives no side effects noted    5. Encounter for immunization    Agrees for immunization today  - COVID-19,PF,MODERNA (18+ YRS BOOSTER .25ML)      There are no Patient Instructions on file for this visit.    Orders Placed This Encounter   Procedures     ZOSTER VACCINE RECOMBINANT ADJUVANTED (SHINGRIX)     TDAP VACCINE (Adacel, Boostrix)     COVID-19,PF,MODERNA (18+ YRS BOOSTER .25ML)     COLOGUARD(EXACT SCIENCES)     Adult Dermatology Referral     There are no discontinued medications.    Return in about 6 weeks (around 6/14/2022) for hypertension.    CHIEF COMPLAINT:  chief complaint follow-up for blood pressure question about ration high cholesterol    HISTORY OF PRESENT ILLNESS:  Naw is a 62 year old female is here for follow-up she reports she has been doing well her skin is better despite having the rash still present on her arms and legs and her body she says been present for 4 years but currently its not itchy.  She states that she been taking her blood pressure medication notes no weakness headaches or dizziness.  She is interested in getting the COVID immunization today.  Her niece states that overall her aunts been doing well at  "home.    REVIEW OF SYSTEMS:     Other than reported HPI 10 point review of  All other systems are negative.    PFSH:    Social history reviewed    TOBACCO USE:  History   Smoking Status     Never Smoker   Smokeless Tobacco     Never Used       VITALS:  Vitals:    05/03/22 0937   BP: 134/72   BP Location: Left arm   Patient Position: Sitting   Cuff Size: Adult Large   Pulse: 114   Temp: 98.3  F (36.8  C)   TempSrc: Oral   SpO2: 96%   Weight: 81.2 kg (179 lb)   Height: 1.511 m (4' 11.5\")     Wt Readings from Last 3 Encounters:   05/03/22 81.2 kg (179 lb)   03/02/22 86.3 kg (190 lb 5 oz)   02/02/22 85.4 kg (188 lb 6 oz)       PHYSICAL EXAM:    Interactive elderly appearing female sitting comfortably exam room no acute distress  HEENT neck supple mucous members moist there is no lymph enlargement noted in neck  CVS regular rate and rhythm no murmurs no rubs no gallops appreciated  Respiratory system clear to auscultation equal breath sounds no wheezes no crackles  Skin she has a flat circular rash present on her forearms arms hands and legs bilaterally there is signs of excoriation present on her right forearm there is erythematous flat elevations of the skin but no evidence of urticaria or hives.  Central clearing of the rash is not noted    DATA REVIEWED:  Additional History from Old Records Summarized (2): 0  Decision to Obtain Records (1): 0  Radiology Tests Summarized or Ordered (1): 0  Labs Reviewed or Ordered (1): 0  Medicine Test Summarized or Ordered (1): 00  Independent Review of EKG or X-RAY(2 each): 0    The visit lasted a total of 30 minutes .    MEDICATIONS:  Current Outpatient Medications   Medication Sig Dispense Refill     amLODIPine (NORVASC) 10 MG tablet Take 1 tablet (10 mg) by mouth daily 30 tablet 11     atorvastatin (LIPITOR) 20 MG tablet Take 1 tablet (20 mg) by mouth daily 30 tablet 3     celecoxib (CELEBREX) 200 MG capsule Take 1 capsule (200 mg) by mouth daily 60 capsule 3     " hydrochlorothiazide (HYDRODIURIL) 25 MG tablet Take 0.5 tablets (12.5 mg) by mouth daily 30 tablet 11     lisinopril (ZESTRIL) 40 MG tablet Take 1 tablet (40 mg) by mouth daily 30 tablet 11

## 2022-05-28 LAB — NONINV COLON CA DNA+OCC BLD SCRN STL QL: NEGATIVE

## 2022-05-31 DIAGNOSIS — Z76.0 ENCOUNTER FOR MEDICATION REFILL: ICD-10-CM

## 2022-05-31 DIAGNOSIS — E78.5 HYPERLIPIDEMIA LDL GOAL <130: ICD-10-CM

## 2022-05-31 RX ORDER — ATORVASTATIN CALCIUM 20 MG/1
20 TABLET, FILM COATED ORAL DAILY
Qty: 30 TABLET | Refills: 11 | Status: SHIPPED | OUTPATIENT
Start: 2022-05-31 | End: 2022-08-10

## 2022-05-31 NOTE — TELEPHONE ENCOUNTER
Refill request received from [facility] for Atorvasttin 20 mg.  Order pended

## 2022-06-08 ENCOUNTER — TRANSFERRED RECORDS (OUTPATIENT)
Dept: HEALTH INFORMATION MANAGEMENT | Facility: CLINIC | Age: 62
End: 2022-06-08
Payer: COMMERCIAL

## 2022-07-13 ENCOUNTER — TRANSFERRED RECORDS (OUTPATIENT)
Dept: HEALTH INFORMATION MANAGEMENT | Facility: CLINIC | Age: 62
End: 2022-07-13

## 2022-08-02 ENCOUNTER — TELEPHONE (OUTPATIENT)
Dept: FAMILY MEDICINE | Facility: CLINIC | Age: 62
End: 2022-08-02

## 2022-08-02 NOTE — TELEPHONE ENCOUNTER
Short-term disability form received for patient. Chart reviewed and it is not clear if the patient's disability has been addressed in office or note. Not certain how to prepare this claim form. Can MD given reason for STD so author can prep form?     Form retained in [facility] office with author until response received.

## 2022-08-02 NOTE — TELEPHONE ENCOUNTER
Forms/Letter Request    Type of form/letter: Work -Short term disability    Have you been seen for this request: Yes     Do we have the form/letter: Yes:     When is form/letter needed by: on 8/10    How would you like the form/letter returned:     Patient Notified form requests are processed in 3-5 business days:Yes    Okay to leave a detailed message?: Yes at Cell number on file:    Telephone Information:   Mobile [phone]

## 2022-08-10 ENCOUNTER — OFFICE VISIT (OUTPATIENT)
Dept: FAMILY MEDICINE | Facility: CLINIC | Age: 62
End: 2022-08-10
Payer: COMMERCIAL

## 2022-08-10 ENCOUNTER — TELEPHONE (OUTPATIENT)
Dept: FAMILY MEDICINE | Facility: CLINIC | Age: 62
End: 2022-08-10

## 2022-08-10 ENCOUNTER — NURSE TRIAGE (OUTPATIENT)
Dept: NURSING | Facility: CLINIC | Age: 62
End: 2022-08-10

## 2022-08-10 VITALS
DIASTOLIC BLOOD PRESSURE: 102 MMHG | WEIGHT: 170 LBS | SYSTOLIC BLOOD PRESSURE: 178 MMHG | OXYGEN SATURATION: 97 % | RESPIRATION RATE: 16 BRPM | HEART RATE: 83 BPM | BODY MASS INDEX: 33.76 KG/M2 | TEMPERATURE: 97.3 F

## 2022-08-10 DIAGNOSIS — M17.0 PRIMARY OSTEOARTHRITIS OF BOTH KNEES: ICD-10-CM

## 2022-08-10 DIAGNOSIS — S82.131G: ICD-10-CM

## 2022-08-10 DIAGNOSIS — M25.462 EFFUSION OF LEFT KNEE: ICD-10-CM

## 2022-08-10 DIAGNOSIS — E78.5 HYPERLIPIDEMIA LDL GOAL <130: ICD-10-CM

## 2022-08-10 DIAGNOSIS — L85.3 XEROSIS CUTIS: ICD-10-CM

## 2022-08-10 DIAGNOSIS — R60.0 PEDAL EDEMA: ICD-10-CM

## 2022-08-10 DIAGNOSIS — M25.461 BILATERAL KNEE SWELLING: ICD-10-CM

## 2022-08-10 DIAGNOSIS — I10 BENIGN ESSENTIAL HYPERTENSION: ICD-10-CM

## 2022-08-10 DIAGNOSIS — M25.461 EFFUSION OF RIGHT KNEE: Primary | ICD-10-CM

## 2022-08-10 DIAGNOSIS — Z76.0 ENCOUNTER FOR MEDICATION REFILL: ICD-10-CM

## 2022-08-10 DIAGNOSIS — B35.4 TINEA CORPORIS: ICD-10-CM

## 2022-08-10 DIAGNOSIS — M25.462 BILATERAL KNEE SWELLING: ICD-10-CM

## 2022-08-10 LAB
ALBUMIN SERPL BCG-MCNC: 4.1 G/DL (ref 3.5–5.2)
ALP SERPL-CCNC: 67 U/L (ref 35–104)
ALT SERPL W P-5'-P-CCNC: 24 U/L (ref 10–35)
ANION GAP SERPL CALCULATED.3IONS-SCNC: 8 MMOL/L (ref 7–15)
AST SERPL W P-5'-P-CCNC: 23 U/L (ref 10–35)
BILIRUB SERPL-MCNC: 0.4 MG/DL
BUN SERPL-MCNC: 13.8 MG/DL (ref 8–23)
CALCIUM SERPL-MCNC: 9.5 MG/DL (ref 8.8–10.2)
CHLORIDE SERPL-SCNC: 105 MMOL/L (ref 98–107)
CREAT SERPL-MCNC: 0.43 MG/DL (ref 0.51–0.95)
DEPRECATED HCO3 PLAS-SCNC: 30 MMOL/L (ref 22–29)
ERYTHROCYTE [DISTWIDTH] IN BLOOD BY AUTOMATED COUNT: 15.3 % (ref 10–15)
GFR SERPL CREATININE-BSD FRML MDRD: >90 ML/MIN/1.73M2
GLUCOSE SERPL-MCNC: 95 MG/DL (ref 70–99)
HCT VFR BLD AUTO: 36.9 % (ref 35–47)
HGB BLD-MCNC: 11.5 G/DL (ref 11.7–15.7)
MCH RBC QN AUTO: 26.3 PG (ref 26.5–33)
MCHC RBC AUTO-ENTMCNC: 31.2 G/DL (ref 31.5–36.5)
MCV RBC AUTO: 84 FL (ref 78–100)
PLATELET # BLD AUTO: 340 10E3/UL (ref 150–450)
POTASSIUM SERPL-SCNC: 3.6 MMOL/L (ref 3.4–5.3)
PROT SERPL-MCNC: 7.6 G/DL (ref 6.4–8.3)
RBC # BLD AUTO: 4.38 10E6/UL (ref 3.8–5.2)
SODIUM SERPL-SCNC: 143 MMOL/L (ref 136–145)
WBC # BLD AUTO: 8.7 10E3/UL (ref 4–11)

## 2022-08-10 PROCEDURE — 80053 COMPREHEN METABOLIC PANEL: CPT | Performed by: FAMILY MEDICINE

## 2022-08-10 PROCEDURE — 36415 COLL VENOUS BLD VENIPUNCTURE: CPT | Performed by: FAMILY MEDICINE

## 2022-08-10 PROCEDURE — 85027 COMPLETE CBC AUTOMATED: CPT | Performed by: FAMILY MEDICINE

## 2022-08-10 PROCEDURE — 99214 OFFICE O/P EST MOD 30 MIN: CPT | Performed by: FAMILY MEDICINE

## 2022-08-10 RX ORDER — LISINOPRIL 40 MG/1
40 TABLET ORAL DAILY
Qty: 30 TABLET | Refills: 11 | Status: SHIPPED | OUTPATIENT
Start: 2022-08-10 | End: 2023-01-12

## 2022-08-10 RX ORDER — ATORVASTATIN CALCIUM 20 MG/1
20 TABLET, FILM COATED ORAL DAILY
Qty: 30 TABLET | Refills: 11 | Status: SHIPPED | OUTPATIENT
Start: 2022-08-10 | End: 2023-01-12

## 2022-08-10 RX ORDER — AMLODIPINE BESYLATE 10 MG/1
10 TABLET ORAL DAILY
Qty: 30 TABLET | Refills: 11 | Status: SHIPPED | OUTPATIENT
Start: 2022-08-10 | End: 2023-01-12

## 2022-08-10 RX ORDER — CELECOXIB 200 MG/1
200 CAPSULE ORAL DAILY
Qty: 30 CAPSULE | Refills: 3 | Status: SHIPPED | OUTPATIENT
Start: 2022-08-10 | End: 2023-01-12

## 2022-08-10 RX ORDER — HYDROCHLOROTHIAZIDE 25 MG/1
25 TABLET ORAL DAILY
Qty: 30 TABLET | Refills: 11 | Status: SHIPPED | OUTPATIENT
Start: 2022-08-10 | End: 2023-01-12

## 2022-08-10 RX ORDER — HYDROCHLOROTHIAZIDE 25 MG/1
12.5 TABLET ORAL DAILY
Qty: 30 TABLET | Refills: 11 | Status: SHIPPED | OUTPATIENT
Start: 2022-08-10 | End: 2022-08-10

## 2022-08-10 NOTE — TELEPHONE ENCOUNTER
Skip Smith RN   8/10/2022  3:22 PM CDT Back to Top        Called with assistance of an  to relay message to parent regarding disability form However, no answer.  Message left on vm.  Call back number provided.      BERONICA Al, RN  [facility]    Jose Geiger MD   8/10/2022  2:55 PM CDT         Please asked the patient whether she can work on remaining seated or if she wants therapy off work since they dropped off disability forms for me she does have a fracture which is probably a chronic old fracture and this is why she has swelling in her right knee

## 2022-08-10 NOTE — PROGRESS NOTES
Assessment & Plan       Medical decision making      Hypertension poorly controlled despite taking amlodipine lisinopril and hydrochlorothiazide raising hydrochlorothiazide dose from 12.5 to 25 mg side effects discussed    Bilateral knee pain x-ray today revealed right medial tibial plateau fracture with bone fragments right-sided knee effusion present progressed left knee x-ray shows loss of medial compartmental space , osteophyte development left knee effusion Celebrex trial follow-up in 3 week orthopedic referral    Swelling in legs and ankles checking a CMP, increasing hydrochlorothiazide to 25 mg a day salt restriction elevate legs up at night      Benign essential hypertension  Blood pressure poorly controlled she has been out of hydrochlorothiazide she has pedal edema I am increasing her hydrochlorothiazide to 25 mg a day  - lisinopril (ZESTRIL) 40 MG tablet; Take 1 tablet (40 mg) by mouth daily  - amLODIPine (NORVASC) 10 MG tablet; Take 1 tablet (10 mg) by mouth daily  - Comprehensive metabolic panel (BMP + Alb, Alk Phos, ALT, AST, Total. Bili, TP); Future  - Comprehensive metabolic panel (BMP + Alb, Alk Phos, ALT, AST, Total. Bili, TP)  - hydrochlorothiazide (HYDRODIURIL) 25 MG tablet; Take 1 tablet (25 mg) by mouth daily    Hyperlipidemia LDL goal <130    She is taking the statin it to has an empty bottle and rechecking the lipid level in 3 months  - atorvastatin (LIPITOR) 20 MG tablet; Take 1 tablet (20 mg) by mouth daily  - CBC with platelets; Future  - CBC with platelets    Encounter for medication refill      - atorvastatin (LIPITOR) 20 MG tablet; Take 1 tablet (20 mg) by mouth daily    Xerosis cutis    Dermatology notes reviewed  - XR Knee AP Standing Bilateral; Future    Bilateral knee swelling  X-rays reviewed today bilateral joint spacing narrowing noted osteophyte noted on AP view of left knee.  Celebrex started follow-up in 3 weeks if her symptoms continue consider corticosteroid injections  -  "celecoxib (CELEBREX) 200 MG capsule; Take 1 capsule (200 mg) by mouth daily    Pedal edema    +2 bilateral nonpitting edema present patient states that is difficult to walk checking a CMP today patient denies any shortness of breath or chest pain  - Comprehensive metabolic panel (BMP + Alb, Alk Phos, ALT, AST, Total. Bili, TP); Future  - CBC with platelets; Future  - Comprehensive metabolic panel (BMP + Alb, Alk Phos, ALT, AST, Total. Bili, TP)  - CBC with platelets    Primary osteoarthritis of both knees    Patient was informed of x-ray results.  She will keep her limbs elevated at night and she will try an ice pack.    Tinea corporis    Reviewed notes by dermatology symptoms have cleared after medication was given      Review of prior external note(s) from - Outside records from DERMATOLOGY         BMI:   Estimated body mass index is 33.76 kg/m  as calculated from the following:    Height as of 5/3/22: 1.511 m (4' 11.5\").    Weight as of this encounter: 77.1 kg (170 lb).       FUTURE APPOINTMENTS:       - Follow-up visit in 3 WEEKS    Return in about 3 weeks (around 8/31/2022) for hypertension.    Jose Geiger MD  [facility] VALERIANO Toussaint is a 62 year old accompanied by her daughter, presenting for the following health issues:  knee cap swollen  Swelling in legs follow-up in rash follow-up and blood pressure    HPI     62-year-old female here for follow-up.  She brought her medications in for review she had a rash for which she saw dermatology says the rash is now better she took the medication which consists of 2 different creams.  She says she started noticing that her ankles and feet were swollen and that her knees will swell and was hard for her to walk this apparently has been going on for months but she did not mention it at her last visit.  She says because the pain is increased its difficult for her to move around and she Maikel has paperwork for me to fill out from her workspace " she has not been to work I do not have access to the paperwork today.  They dropped it off apparently at the clinic a week ago.  She denies any shortness of breath, chest pain her appetite is normal she does not eat salty food.        Review of Systems   Constitutional, HEENT, cardiovascular, pulmonary, gi and gu systems are negative, except as otherwise noted.      Objective    There were no vitals taken for this visit.  There is no height or weight on file to calculate BMI.  Physical Exam   GENERAL: healthy, alert and no distress  EYES: Eyes grossly normal to inspection, PERRL and conjunctivae and sclerae normal  NECK: no adenopathy, no asymmetry, masses, or scars and thyroid normal to palpation  RESP: lungs clear to auscultation - no rales, rhonchi or wheezes  CV: regular rate and rhythm, normal S1 S2, no S3 or S4, no murmur, click or rub, no peripheral edema and peripheral pulses strong  ABDOMEN: soft, nontender, no hepatosplenomegaly, no masses and bowel sounds normal  MS: Bilateral swelling noted at both knee joints.  She is tender over the anterior aspect of the right patella and the inferior aspect of the left patella.  Lachman's test could not be performed because of discomfort drawer tests are negative bilaterally forward flexion and extension of her right knee limited to 30 and 20 degrees respectively, flexion and extension at her left knee limited to 30 and 20 degrees respectively.  SKIN: no suspicious lesions or rashes  NEURO: Normal strength and tone, mentation intact and speech normal  PSYCH: mentation appears normal, affect normal/bright            .  ..

## 2022-08-10 NOTE — TELEPHONE ENCOUNTER
Patient returning call from Dr. Geiger via . Pt stated that she would not be able to work sitting for long periods of time.     LAURA HENDRICKSON, JACKIE    Reason for Disposition    Information only question and nurse able to answer    Additional Information    Negative: Nursing judgment    Negative: Nursing judgment    Negative: Nursing judgment    Negative: Nursing judgment    Protocols used: INFORMATION ONLY CALL - NO TRIAGE-A-[place]

## 2022-08-10 NOTE — TELEPHONE ENCOUNTER
Patient handed the note to me after the visit was completed, it does she want to work or return to work in a sitting position?  I have filled out the form but I am assuming she wants to go to work if she does not have to stand

## 2022-08-11 NOTE — TELEPHONE ENCOUNTER
Author contacted the patient at request of Dr. Geiger who needs more information to fill out her short-term disability forms. The patient states that she would like to be off of work for 5-6 months. The patient will be staying home during the disability time and recovering. Patient will keep visit MD to track progress.     The patient has already stopped going to her job and her last day of work was 07/06/2022. Thus, the patient is asking MD to list her as off work from 07/06/2022-01/06/2023.

## 2022-08-12 NOTE — TELEPHONE ENCOUNTER
Spoke to patient on the phone to relay provider message to clarify whether patient can work while seated.  Patient reported she cannot work sitting or standing for length of time.  Patient stopped working on 7/6/2022.Patient is interested in returning to work within six months from now.  Interested in therapy treatment to speed up healing process.    Patient will  paperwork from provider office when completed.    HUNG AlN, RN  [facility]

## 2022-08-16 ENCOUNTER — OFFICE VISIT (OUTPATIENT)
Dept: ORTHOPEDICS | Facility: CLINIC | Age: 62
End: 2022-08-16
Attending: FAMILY MEDICINE
Payer: COMMERCIAL

## 2022-08-16 VITALS
WEIGHT: 170 LBS | DIASTOLIC BLOOD PRESSURE: 82 MMHG | SYSTOLIC BLOOD PRESSURE: 147 MMHG | HEIGHT: 60 IN | HEART RATE: 70 BPM | RESPIRATION RATE: 20 BRPM | BODY MASS INDEX: 33.38 KG/M2

## 2022-08-16 DIAGNOSIS — M17.12 PRIMARY OSTEOARTHRITIS OF LEFT KNEE: Primary | ICD-10-CM

## 2022-08-16 DIAGNOSIS — M17.31 POST-TRAUMATIC OSTEOARTHRITIS OF RIGHT KNEE: ICD-10-CM

## 2022-08-16 DIAGNOSIS — S82.131G: ICD-10-CM

## 2022-08-16 DIAGNOSIS — M25.462 EFFUSION OF LEFT KNEE: ICD-10-CM

## 2022-08-16 DIAGNOSIS — M25.461 EFFUSION OF RIGHT KNEE: ICD-10-CM

## 2022-08-16 PROCEDURE — 20610 DRAIN/INJ JOINT/BURSA W/O US: CPT | Mod: 50 | Performed by: ORTHOPAEDIC SURGERY

## 2022-08-16 PROCEDURE — 99203 OFFICE O/P NEW LOW 30 MIN: CPT | Mod: 25 | Performed by: ORTHOPAEDIC SURGERY

## 2022-08-16 RX ORDER — LIDOCAINE HYDROCHLORIDE 10 MG/ML
4 INJECTION, SOLUTION INFILTRATION; PERINEURAL
Status: SHIPPED | OUTPATIENT
Start: 2022-08-16

## 2022-08-16 RX ORDER — METHYLPREDNISOLONE ACETATE 80 MG/ML
80 INJECTION, SUSPENSION INTRA-ARTICULAR; INTRALESIONAL; INTRAMUSCULAR; SOFT TISSUE
Status: SHIPPED | OUTPATIENT
Start: 2022-08-16

## 2022-08-16 RX ADMIN — LIDOCAINE HYDROCHLORIDE 4 ML: 10 INJECTION, SOLUTION INFILTRATION; PERINEURAL at 11:41

## 2022-08-16 RX ADMIN — METHYLPREDNISOLONE ACETATE 80 MG: 80 INJECTION, SUSPENSION INTRA-ARTICULAR; INTRALESIONAL; INTRAMUSCULAR; SOFT TISSUE at 11:41

## 2022-08-16 NOTE — PROGRESS NOTES
Large Joint Injection/Arthocentesis: bilateral knee    Date/Time: 8/16/2022 11:41 AM  Performed by: Jose Geiger MD  Authorized by: Jose Geiger MD     Indications:  Pain and osteoarthritis  Needle Size:  22 G  Guidance: landmark guided    Approach:  Anteromedial  Location:  Knee  Laterality:  Bilateral      Medications (Right):  4 mL lidocaine 1 %; 80 mg methylPREDNISolone 80 MG/ML  Medications (Left):  4 mL lidocaine 1 %; 80 mg methylPREDNISolone 80 MG/ML  Procedure discussed: discussed risks, benefits, and alternatives    Consent Given by:  Patient  Timeout: timeout called immediately prior to procedure    Prep: patient was prepped and draped in usual sterile fashion      April [facility] 8/16/2022 11:43 AM

## 2022-08-16 NOTE — LETTER
8/16/2022         RE: Breana Conner  [address] [address]        Dear Colleague,    Thank you for referring your [facility], [facility] Conner[facility] Please see a copy of my visit note below.    SUBJECTIVE:   Breana Conner is a 62 year old female who is seen in consultation at the request of Dr. Geiger for evaluation of bilateral knee pain.  Duration: after getting vaccine shots, the knees started to hurt. The third shot made them hurt so bad that she couldn't walk. (1 month later).   No known injury.    Present symptoms:  (Per  on phone)  Swelling.   Pain to walk. Uses a cane.  Night pain.  Feeling of giving-way? We are not sure due to language barriers.    Treatments tried to this point: celebrex     Previous knee issues: none.     Past Medical History: No past medical history on file.  Past Surgical History: No past surgical history on file.  Family History:   Family History   Problem Relation Age of Onset     Breast Cancer No family hx of      Social History:   Social History     Tobacco Use     Smoking status: Never Smoker     Smokeless tobacco: Never Used   Substance Use Topics     Alcohol use: No       Review of Systems:  Constitutional:  NEGATIVE for fever, chills, change in weight  Integumentary/Skin:  NEGATIVE for worrisome rashes, moles or lesions  Eyes:  NEGATIVE for vision changes or irritation  ENT/Mouth:  NEGATIVE for ear, mouth and throat problems  Resp:  NEGATIVE for significant cough or SOB  Breast:  NEGATIVE for masses, tenderness or discharge  CV:  NEGATIVE for chest pain, palpitations or peripheral edema  GI:  NEGATIVE for nausea, abdominal pain, heartburn, or change in bowel habits  :  Negative   Musculoskeletal:  See HPI above  Neuro:  NEGATIVE for weakness, dizziness or paresthesias  Endocrine:  NEGATIVE for temperature intolerance, skin/hair changes  Heme/allergy/immune:  NEGATIVE for bleeding problems  Psychiatric:  NEGATIVE for changes in  "mood or affect      OBJECTIVE:  Physical Exam:  BP (!) 147/82   Pulse 70   Resp 20   Ht 1.511 m (4' 11.5\")   Wt 77.1 kg (170 lb)   BMI 33.76 kg/m    General Appearance: healthy, alert and no distress   Skin: no suspicious lesions or rashes  Neuro: Normal strength and tone, mentation intact and speech normal  Vascular: good pulses, and cappillary refill   Lymph: no lymphadenopathy   Psych:  mentation appears normal and affect normal/bright  Resp: no increased work of breathing     Bilateral Knee Exam:  Gait: waddling  Alignment: significant varus on left, mild on right       Patellofemoral joint: no crepitations in the patellofemoral joint.  Effusion: moderate bilateral   ROM: right 0-135.  left: 0-140  Tender: medial joint line and lateral joint line  Masses: popliteal bilateral   Ligaments:   Lachman's: stable   Anterior/Posterior drawer: stable,   Varus/Valgus stress: stable to varus and valgus stress. Varus corrects bilaterally      X-rays:  Obtained 8/10/22, reviewed in the office with the patient today:   RIGHT KNEE: Moderate-sized knee joint effusion. There is slight  deformity of the medial portion of the medial tibial plateau  compatible with a fracture, likely chronic in nature with a few small  fracture fragments along the medial portion of the medial tibial  plateau. Mild degenerative changes in the lateral compartment.     LEFT KNEE: No fracture. Advanced degenerative changes in the medial  compartment with resulting genu varum. Mild degenerative changes in  the lateral compartment. Moderate-sized knee joint effusion.       ASSESSMENT:     Osteoarthritis bilateral knees. ? Of role of covid vaccine. She also had Covid infection, and doubt if either caused this  Osteoarthritis, but may have aggravated/inflamed the knees..  Old medial tibial plateau fracture, with no sign of AVN, and she has fallen in the past many times. .   Bilateral varus knees.    PLAN:   We discussed total knee arthroplasty in " some detail. She will need total knee arthroplasty at some point.  We discussed other temporizing measures such as  brace, corticosteroid injection, physical therapy.  Which we decided to try first.    With the patient's consent, bilateral knee(s) injected intra-articularly with 80mg of Depomedrol and 4cc of local anesthetic after sterile prep.  physical therapy and  trial ordered.     Return to clinic: 1 year or sooner as needed     EMILIE Hall MD  Dept. Orthopedic Surgery  [facility]     Large Joint Injection/Arthocentesis: bilateral knee    Date/Time: 8/16/2022 11:41 AM  Performed by: Jose Geiger MD  Authorized by: Jose Geiger MD     Indications:  Pain and osteoarthritis  Needle Size:  22 G  Guidance: landmark guided    Approach:  Anteromedial  Location:  Knee  Laterality:  Bilateral      Medications (Right):  4 mL lidocaine 1 %; 80 mg methylPREDNISolone 80 MG/ML  Medications (Left):  4 mL lidocaine 1 %; 80 mg methylPREDNISolone 80 MG/ML  Procedure discussed: discussed risks, benefits, and alternatives    Consent Given by:  Patient  Timeout: timeout called immediately prior to procedure    Prep: patient was prepped and draped in usual sterile fashion      April [facility] 8/16/2022 11:43 AM          Again, thank you for allowing me to participate in the care of your patient.        Sincerely,        Christiano Hall MD

## 2022-08-16 NOTE — PROGRESS NOTES
"SUBJECTIVE:   Breana Conner is a 62 year old female who is seen in consultation at the request of Dr. Geiger for evaluation of bilateral knee pain.  Duration: after getting vaccine shots, the knees started to hurt. The third shot made them hurt so bad that she couldn't walk. (1 month later).   No known injury.    Present symptoms:  (Per  on phone)  Swelling.   Pain to walk. Uses a cane.  Night pain.  Feeling of giving-way? We are not sure due to language barriers.    Treatments tried to this point: celebrex     Previous knee issues: none.     Past Medical History: No past medical history on file.  Past Surgical History: No past surgical history on file.  Family History:   Family History   Problem Relation Age of Onset     Breast Cancer No family hx of      Social History:   Social History     Tobacco Use     Smoking status: Never Smoker     Smokeless tobacco: Never Used   Substance Use Topics     Alcohol use: No       Review of Systems:  Constitutional:  NEGATIVE for fever, chills, change in weight  Integumentary/Skin:  NEGATIVE for worrisome rashes, moles or lesions  Eyes:  NEGATIVE for vision changes or irritation  ENT/Mouth:  NEGATIVE for ear, mouth and throat problems  Resp:  NEGATIVE for significant cough or SOB  Breast:  NEGATIVE for masses, tenderness or discharge  CV:  NEGATIVE for chest pain, palpitations or peripheral edema  GI:  NEGATIVE for nausea, abdominal pain, heartburn, or change in bowel habits  :  Negative   Musculoskeletal:  See HPI above  Neuro:  NEGATIVE for weakness, dizziness or paresthesias  Endocrine:  NEGATIVE for temperature intolerance, skin/hair changes  Heme/allergy/immune:  NEGATIVE for bleeding problems  Psychiatric:  NEGATIVE for changes in mood or affect      OBJECTIVE:  Physical Exam:  BP (!) 147/82   Pulse 70   Resp 20   Ht 1.511 m (4' 11.5\")   Wt 77.1 kg (170 lb)   BMI 33.76 kg/m    General Appearance: healthy, alert and no distress   Skin: no suspicious lesions or " saul  Neuro: Normal strength and tone, mentation intact and speech normal  Vascular: good pulses, and cappillary refill   Lymph: no lymphadenopathy   Psych:  mentation appears normal and affect normal/bright  Resp: no increased work of breathing     Bilateral Knee Exam:  Gait: waddling  Alignment: significant varus on left, mild on right       Patellofemoral joint: no crepitations in the patellofemoral joint.  Effusion: moderate bilateral   ROM: right 0-135.  left: 0-140  Tender: medial joint line and lateral joint line  Masses: popliteal bilateral   Ligaments:   Lachman's: stable   Anterior/Posterior drawer: stable,   Varus/Valgus stress: stable to varus and valgus stress. Varus corrects bilaterally      X-rays:  Obtained 8/10/22, reviewed in the office with the patient today:   RIGHT KNEE: Moderate-sized knee joint effusion. There is slight  deformity of the medial portion of the medial tibial plateau  compatible with a fracture, likely chronic in nature with a few small  fracture fragments along the medial portion of the medial tibial  plateau. Mild degenerative changes in the lateral compartment.     LEFT KNEE: No fracture. Advanced degenerative changes in the medial  compartment with resulting genu varum. Mild degenerative changes in  the lateral compartment. Moderate-sized knee joint effusion.       ASSESSMENT:     Osteoarthritis bilateral knees. ? Of role of covid vaccine. She also had Covid infection, and doubt if either caused this  Osteoarthritis, but may have aggravated/inflamed the knees..  Old medial tibial plateau fracture, with no sign of AVN, and she has fallen in the past many times. .   Bilateral varus knees.    PLAN:   We discussed total knee arthroplasty in some detail. She will need total knee arthroplasty at some point.  We discussed other temporizing measures such as  brace, corticosteroid injection, physical therapy.  Which we decided to try first.    With the patient's consent,  bilateral knee(s) injected intra-articularly with 80mg of Depomedrol and 4cc of local anesthetic after sterile prep.  physical therapy and  trial ordered.     Return to clinic: 1 year or sooner as needed     EMILIE Hall MD  Dept. Orthopedic Surgery  [facility]

## 2022-09-01 ENCOUNTER — OFFICE VISIT (OUTPATIENT)
Dept: FAMILY MEDICINE | Facility: CLINIC | Age: 62
End: 2022-09-01
Payer: COMMERCIAL

## 2022-09-01 VITALS
SYSTOLIC BLOOD PRESSURE: 130 MMHG | TEMPERATURE: 98.5 F | WEIGHT: 168.75 LBS | RESPIRATION RATE: 16 BRPM | HEIGHT: 60 IN | DIASTOLIC BLOOD PRESSURE: 80 MMHG | HEART RATE: 97 BPM | OXYGEN SATURATION: 98 % | BODY MASS INDEX: 33.13 KG/M2

## 2022-09-01 DIAGNOSIS — E78.5 HYPERLIPIDEMIA LDL GOAL <130: Primary | ICD-10-CM

## 2022-09-01 DIAGNOSIS — I10 BENIGN ESSENTIAL HYPERTENSION: ICD-10-CM

## 2022-09-01 DIAGNOSIS — M17.0 PRIMARY OSTEOARTHRITIS OF BOTH KNEES: ICD-10-CM

## 2022-09-01 DIAGNOSIS — M25.50 MULTIPLE JOINT PAIN: ICD-10-CM

## 2022-09-01 PROCEDURE — 99214 OFFICE O/P EST MOD 30 MIN: CPT | Performed by: FAMILY MEDICINE

## 2022-09-01 RX ORDER — CELECOXIB 200 MG/1
200 CAPSULE ORAL DAILY
Qty: 60 CAPSULE | Refills: 4 | Status: SHIPPED | OUTPATIENT
Start: 2022-09-01 | End: 2024-10-03

## 2022-09-01 NOTE — PROGRESS NOTES
"  Assessment & Plan     Hyperlipidemia LDL goal <130  She did not bring her statin in today.  She is taking it correctly.  A pill count was performed.    Multiple joint pain    She says joint pain is somewhat better with the Celebrex she has noticed the side effects from this medication I have represcribed it for her.  - celecoxib (CELEBREX) 200 MG capsule; Take 1 capsule (200 mg) by mouth daily    Benign essential hypertension    Blood pressures well controlled I did review her lab test from her last visit with showed a normal CMP and BMP.  We will continue the hydrochlorothiazide at 25 mg and continue the lisinopril at the current dose.    Primary osteoarthritis of both knees    She was seen by orthopedics and treated her knee pain has become slightly better she can walk but still has pain when going up stairs she is elected not to return to work I did fill out paperwork for her to have her come back in a month and now she says she is unsure whether she wants to return in 6 months or quit altogether she will let me know.      956}     BMI:   Estimated body mass index is 33.51 kg/m  as calculated from the following:    Height as of this encounter: 1.511 m (4' 11.5\").    Weight as of this encounter: 76.5 kg (168 lb 12 oz).           Return in about 3 months (around 12/1/2022) for hypertension.    Jose Geiger MD  [facility]    Saqib Toussaint is a 62 year old, presenting for the following health issues:  follow up   Blood pressure leg swelling knee pain body ache and cholesterol    HPI   62-year-old female here after seeing orthopedics recently.  She reports that she was given injections and that reduce the pain she said the swelling is decreased in her knees but is still difficult for her to walk.  She states she has been resting at home.  She states she is not really sure if she wants to return to work initially she thought 1 months rest would be enough for which I did fill out paperwork but " "now she states that she may need to be off 6 months or longer.  She has been using all of her medications and is brought them in for review.  She also states that she does not feel dizzy or tired.        Review of Systems   Constitutional, HEENT, cardiovascular, pulmonary, gi and gu systems are negative, except as otherwise noted.      Objective    /80 (BP Location: Right arm, Patient Position: Sitting, Cuff Size: Adult Regular)   Pulse 97   Temp 98.5  F (36.9  C) (Oral)   Resp 16   Ht 1.511 m (4' 11.5\")   Wt 76.5 kg (168 lb 12 oz)   SpO2 98%   BMI 33.51 kg/m    Body mass index is 33.51 kg/m .  Physical Exam   GENERAL: healthy, alert and no distress  HENT: ear canals and TM's normal, nose and mouth without ulcers or lesions  NECK: no adenopathy, no asymmetry, masses, or scars and thyroid normal to palpation  RESP: lungs clear to auscultation - no rales, rhonchi or wheezes  CV: regular rate and rhythm, normal S1 S2, no S3 or S4, no murmur, click or rub, no peripheral edema and peripheral pulses strong  SKIN: no suspicious lesions or rashes  NEURO: Normal strength and tone, mentation intact and speech normal  PSYCH: mentation appears normal, affect normal/bright                "

## 2022-09-06 ENCOUNTER — TELEPHONE (OUTPATIENT)
Dept: NURSING | Facility: CLINIC | Age: 62
End: 2022-09-06

## 2022-09-06 NOTE — TELEPHONE ENCOUNTER
Patient calls with the help of an .    She is looking for a letter for her health condition stating she can not walk.    She states her work has not received this letter.    She states she sent a form to the provider.    Please call when the form is done.    Hilda Segura RN on 9/6/2022 at 4:30 PM

## 2022-09-07 NOTE — TELEPHONE ENCOUNTER
Chart reviewed. Please review findings below.     Primary osteoarthritis of both knees     She was seen by orthopedics and treated her knee pain has become slightly better she can walk but still has pain when going up stairs she is elected not to return to work I did fill out paperwork for her to have her come back in a month and now she says she is unsure whether she wants to return in 6 months or quit altogether she will let me know.

## 2022-09-08 NOTE — TELEPHONE ENCOUNTER
Patient family indicates that they will bring in new form in case we cannot find the old form.     Patient will be off work for 6 months. Her last day of work was July 6, 2022. She will recover at home. On the form, please excuse the patient (once forms received) for dates 07/06/2022-01/06/2022.     Patient family will  copy at clinic after it has been faxed.

## 2022-09-08 NOTE — TELEPHONE ENCOUNTER
Patient family in office again today. Please review. CMT does NOT have form. MD please review forms and locate. See most updated phone messages and complete form.     Family would like this faxed back to employer when finished and family would like a copy sent in the mail.     MD please review and complete task ASAP. See below.   Me  to Jose Geiger MD         9:42 AM  Note  Author contacted the patient at request of Dr. Geiger who needs more information to fill out her short-term disability forms. The patient states that she would like to be off of work for 5-6 months. The patient will be staying home during the disability time and recovering. Patient will keep visit MD to track progress.      The patient has already stopped going to her job and her last day of work was 07/06/2022. Thus, the patient is asking MD to list her as off work from 07/06/2022-01/06/2023.         Me  to Jose Geiger MD         9:42 AM  Note  Author contacted the patient at request of Dr. Geiger who needs more information to fill out her short-term disability forms. The patient states that she would like to be off of work for 5-6 months. The patient will be staying home during the disability time and recovering. Patient will keep visit MD to track progress.      The patient has already stopped going to her job and her last day of work was 07/06/2022. Thus, the patient is asking MD to list her as off work from 07/06/2022-01/06/2023.

## 2022-09-12 NOTE — TELEPHONE ENCOUNTER
VISIT FACILITATOR collected or printed forms from . Forms pre-filled for provider review, completion, and signature. Forms placed in provider blue folder. Thanks.     New form received. Copy retained in CMT office.

## 2022-09-13 NOTE — TELEPHONE ENCOUNTER
I have received the forms thank you does the patient wish to return to work or stay home?  What is her plan?

## 2022-09-13 NOTE — TELEPHONE ENCOUNTER
Tl (grand-dtr) calls back inquiring about status of forms. Writer inform caller FV gave form to provider- awaiting for response. Writer will send provider another message. Caller verbalizes understanding.

## 2022-09-14 NOTE — TELEPHONE ENCOUNTER
Patient will recover at home. Wants to take 6 months off total. July 7, 2022 was her last day at work, return to work January 7, 2023.

## 2022-09-15 NOTE — TELEPHONE ENCOUNTER
Completed form copied and copied to EHR scanning .     Tl (grandchild) notified and states will  at [facility] .

## 2022-09-21 ENCOUNTER — HOSPITAL ENCOUNTER (OUTPATIENT)
Dept: PHYSICAL THERAPY | Facility: REHABILITATION | Age: 62
Discharge: HOME | End: 2022-09-21
Attending: ORTHOPAEDIC SURGERY
Payer: COMMERCIAL

## 2022-09-21 DIAGNOSIS — M17.12 PRIMARY OSTEOARTHRITIS OF LEFT KNEE: ICD-10-CM

## 2022-09-21 DIAGNOSIS — M17.31 POST-TRAUMATIC OSTEOARTHRITIS OF RIGHT KNEE: ICD-10-CM

## 2022-09-21 PROCEDURE — 97162 PT EVAL MOD COMPLEX 30 MIN: CPT | Mod: GP

## 2022-09-21 PROCEDURE — 97110 THERAPEUTIC EXERCISES: CPT | Mod: GP

## 2022-09-23 NOTE — PROGRESS NOTES
09/21/22 0800   General Information   Type of Visit Initial OP Ortho PT Evaluation   Start of Care Date 09/21/22   Referring Physician Christiano Hall MD   Patient/Family Goals Statement to walk better without pain in the knees   Orders Evaluate and Treat   Date of Order 08/16/22   Certification Required? Yes   Medical Diagnosis Primary osteoarthritis of left knee, post-traumatic osteoarthritis of right knee   Surgical/Medical history reviewed Yes   Precautions/Limitations no known precautions/limitations   Weight-Bearing Status - LUE full weight-bearing   Weight-Bearing Status - RUE full weight-bearing   Weight-Bearing Status - LLE full weight-bearing   Weight-Bearing Status - RLE full weight-bearing   General Information Comments PMH: high blood pressure       Present Yes   Language Other  (Jossy, in person)   Body Part(s)   Body Part(s) Knee   Presentation and Etiology   Pertinent history of current problem (include personal factors and/or comorbidities that impact the POC) Pt reports that she has bilateral knee pain. Pt reports that she has more pain in her right knee than her left knee. Pt reports that her knee pain has been going on since 2019. Pt reports that she has been using a cane for 2-3 months. Pt reports that she has recently gotten an injection in her knees. Pt reports that the injections did not help with her pain, but decreased her swelling. Pt reports that at times she feels like her knee is buckling, but she has not fallen. Pt reports that she had a fall last winter. Pt reports that she has dull, tight pressure pain. Pt present to PT appointment with kenyon.   Impairments A. Pain;C. Swelling;D. Decreased ROM;E. Decreased flexibility;F. Decreased strength and endurance;G. Impaired balance;H. Impaired gait   Functional Limitations perform activities of daily living;perform required work activities;perform desired leisure / sports activities   Symptom Location Bilateral  knees   How/Where did it occur From insidious onset   Onset date of current episode/exacerbation 09/21/20   Chronicity Chronic   Pain rating (0-10 point scale) Best (/10);Worst (/10)  (Current: 5/10)   Best (/10) 5/10   Worst (/10) 7/10   Pain quality B. Dull;H. Other   Pain quality comment tight, pressure pain   Frequency of pain/symptoms C. With activity   Pain/symptoms are: The same all the time   Pain/symptoms exacerbated by B. Walking  (any activities, standing, stairs, getting in and out of cars)   Pain/symptoms eased by D. Nothing  (Pt reports that injections did not help with the pain)   Progression of symptoms since onset: Worsened   Current Level of Function   Current equipment-Gait/Locomotion Standard cane   Fall Risk Screen   Fall screen completed by PT   Have you fallen 2 or more times in the past year? No   Have you fallen and had an injury in the past year? No   Is patient a fall risk? No   Fall screen comments Pt ambulates with straight cane and assist from niece.   Abuse Screen (yes response referral indicated)   Feels Unsafe at Home or Work/School no   Feels Threatened by Someone no   Does Anyone Try to Keep You From Having Contact with Others or Doing Things Outside Your Home? no   Physical Signs of Abuse Present no   System Outcome Measures   Outcome Measures   (LEFS: 14/80)   Knee Objective Findings   Gait/Locomotion Pt ambulates with straight cane and assist from niece. Pt ambulates with antalgic gait.   Posture Forward flexed posture   Anterior Drawer Test Positive bilaterally   Posterior Drawer Test Positive bilaterally   Varus Stress Test Positive on R   Valgus Stress Test Positive on R   Side (if bilateral, select both right and left) Right;Left   Right Knee Extension AROM 0 degrees   Right Knee Extension PROM WFL   Left Knee Extension AROM 0 degrees   Left Knee Extension PROM WFL   Right Knee Flexion AROM 120 degrees, painful   Right Knee Flexion PROM WFL   Left Knee Flexion AROM 125  degrees, painful   Left Knee Flexion PROM WFL   Right Knee Flexion Strength 3+/5, painful   Left Knee Flexion Strength 4/5, pain   Right Knee Extension Strength 4/5, pain   Left Knee Extension Strength 4/5, pain   Right Gastrocnemius Flexibility Limited   Left Gastrocnemius Flexibility Limited   Right Hamstring Flexibility Limited   Left Hamstring Flexibility Limited   Right Quadricep Flexibility Limited   Left Quadricep Flexibility Limited   Right ITB Flexibility Limited   Left ITB Flexibility Limited   Planned Therapy Interventions   Planned Therapy Interventions balance training;gait training;joint mobilization;manual therapy;neuromuscular re-education;ROM;strengthening;stretching   Planned Modality Interventions   Planned Modality Interventions Cryotherapy;Electrical stimulation;Hot packs;TENS   Clinical Impression   Criteria for Skilled Therapeutic Interventions Met yes, treatment indicated   PT Diagnosis Bilateral knee pain   Influenced by the following impairments Decreased ROM, decreased strength, impaired gait, pain   Functional limitations due to impairments Impaired functional mobility, decreased tolerance completing ADL's, recreational activities, and work related tasks   Clinical Presentation Evolving/Changing   Clinical Decision Making (Complexity) Moderate complexity   Therapy Frequency 1 time/week   Predicted Duration of Therapy Intervention (days/wks) 1 time a week for minimum of 6 weeks, minimum of 6 sessions   Risk & Benefits of therapy have been explained Yes   Patient, Family & other staff in agreement with plan of care Yes   Clinical Impression Comments Pt is a 62 year old female who presents to physical therapy with bilateral knee pain. Pt has pain with mobility. Pt has decreased strength and decreased ROM bilaterally. Pt has impaired gait and scored 17/80 on LEFS. Pt would benefit from physical therapy to improve strength and decrease pain to improve functional mobility and increase tolerance  completing ADL's, recreational activities and work related tasks.   ORTHO GOALS   PT Ortho Eval Goals 1;2;3   Ortho Goal 1   Goal Identifier HEP   Goal Description Pt will be independent with HEP to improve mobility and decrease pain.   Target Date 12/19/22   Ortho Goal 2   Goal Identifier Walking   Goal Description Pt will report ability to walk greater than 2 block with less than 3/10 pain to improve functional mobility and increase tolerance completing ADL's, recreational activities and work related tasks.   Target Date 12/19/22   Ortho Goal 3   Goal Identifier Stairs   Goal Description Pt will report ability to ambulate up and down 1 flight of stairs with less than 3/10 pain to improve functional mobility and increase tolerance completing ADL's, recreational activities and work related tasks.   Target Date 12/19/22   Total Evaluation Time   PT Eval, Moderate Complexity Minutes (96352) 20   Therapy Certification   Certification date from 09/21/22   Certification date to 12/19/22   Medical Diagnosis Primary osteoarthritis of left knee, post-traumatic osteoarthritis of right knee     Terri Mary, PT, DPT

## 2022-09-23 NOTE — PROGRESS NOTES
[facility]    OUTPATIENT PHYSICAL THERAPY ORTHOPEDIC EVALUATION  PLAN OF TREATMENT FOR OUTPATIENT REHABILITATION  (COMPLETE FOR INITIAL CLAIMS ONLY)  Patient's Last Name, First Name, M.I.  YOB: 1960  Breana Conner  S    Provider s Name:  [facility]   Medical Record No.  [id number]   Start of Care Date:  09/21/22   Onset Date:  09/21/20   Type:     _X__PT   ___OT   ___SLP Medical Diagnosis:  Primary osteoarthritis of left knee, post-traumatic osteoarthritis of right knee     PT Diagnosis:  Bilateral knee pain   Visits from SOC:  1      _________________________________________________________________________________  Plan of Treatment/Functional Goals:  balance training, gait training, joint mobilization, manual therapy, neuromuscular re-education, ROM, strengthening, stretching     Cryotherapy, Electrical stimulation, Hot packs, TENS     Goals  Goal Identifier: HEP  Goal Description: Pt will be independent with HEP to improve mobility and decrease pain.  Target Date: 12/19/22    Goal Identifier: Walking  Goal Description: Pt will report ability to walk greater than 2 block with less than 3/10 pain to improve functional mobility and increase tolerance completing ADL's, recreational activities and work related tasks.  Target Date: 12/19/22    Goal Identifier: Stairs  Goal Description: Pt will report ability to ambulate up and down 1 flight of stairs with less than 3/10 pain to improve functional mobility and increase tolerance completing ADL's, recreational activities and work related tasks.  Target Date: 12/19/22                                                           Therapy Frequency:  1 time/week  Predicted Duration of Therapy Intervention:  1 time a week for minimum of 6 weeks, minimum of 6 sessions    Terri Mary, PT                 I CERTIFY THE NEED FOR THESE  SERVICES FURNISHED UNDER        THIS PLAN OF TREATMENT AND WHILE UNDER MY CARE     (Physician co-signature of this document indicates review and certification of the therapy plan).                     Certification Date From:  09/21/22   Certification Date To:  12/19/22    Referring Provider:  Christiano Hall MD    Initial Assessment        See Epic Evaluation Start of Care Date: 09/21/22

## 2022-09-30 ENCOUNTER — HOSPITAL ENCOUNTER (OUTPATIENT)
Dept: PHYSICAL THERAPY | Facility: REHABILITATION | Age: 62
Discharge: HOME | End: 2022-09-30
Attending: ORTHOPAEDIC SURGERY
Payer: COMMERCIAL

## 2022-09-30 DIAGNOSIS — G89.29 CHRONIC PAIN OF RIGHT KNEE: Primary | ICD-10-CM

## 2022-09-30 DIAGNOSIS — M25.561 CHRONIC PAIN OF RIGHT KNEE: Primary | ICD-10-CM

## 2022-09-30 PROCEDURE — 97140 MANUAL THERAPY 1/> REGIONS: CPT | Mod: GP

## 2022-09-30 PROCEDURE — 97110 THERAPEUTIC EXERCISES: CPT | Mod: GP

## 2022-10-07 ENCOUNTER — HOSPITAL ENCOUNTER (OUTPATIENT)
Dept: PHYSICAL THERAPY | Facility: REHABILITATION | Age: 62
Discharge: HOME | End: 2022-10-07
Attending: ORTHOPAEDIC SURGERY
Payer: COMMERCIAL

## 2022-10-07 DIAGNOSIS — G89.29 CHRONIC PAIN OF RIGHT KNEE: Primary | ICD-10-CM

## 2022-10-07 DIAGNOSIS — M25.561 CHRONIC PAIN OF RIGHT KNEE: Primary | ICD-10-CM

## 2022-10-07 PROCEDURE — 97116 GAIT TRAINING THERAPY: CPT | Mod: GP

## 2022-10-07 PROCEDURE — 97110 THERAPEUTIC EXERCISES: CPT | Mod: GP

## 2022-10-11 DIAGNOSIS — M17.31 POST-TRAUMATIC OSTEOARTHRITIS OF RIGHT KNEE: ICD-10-CM

## 2022-10-11 DIAGNOSIS — M17.12 PRIMARY OSTEOARTHRITIS OF LEFT KNEE: Primary | ICD-10-CM

## 2022-10-18 ENCOUNTER — TELEPHONE (OUTPATIENT)
Dept: FAMILY MEDICINE | Facility: CLINIC | Age: 62
End: 2022-10-18

## 2022-10-18 NOTE — TELEPHONE ENCOUNTER
Pt's granddaughter was asking about this paperwork, as it is past due and [facility] has asked about it.  Please call patient ASAP when it has been faxed, and she would like a copy as well      Name of form/paperwork: Medical Opinion Form  Have you been seen for this request: yes  Do we have the form: yes in blue folder  When is form needed by: ASAP  How would you like the form returned: Faxed and anna will  copy   and Phone#:[facility] [phone]  Fax [phone] fax to [facility] Attn Myriam ART  Patient Notified form requests are processed in 3-5 business days: yes  (If patient needs form sooner, please note that in this message.)  Okay to leave a detailed message? yes

## 2022-10-21 ENCOUNTER — HOSPITAL ENCOUNTER (OUTPATIENT)
Dept: PHYSICAL THERAPY | Facility: REHABILITATION | Age: 62
Discharge: HOME | End: 2022-10-21
Payer: COMMERCIAL

## 2022-10-21 DIAGNOSIS — G89.29 CHRONIC PAIN OF RIGHT KNEE: Primary | ICD-10-CM

## 2022-10-21 DIAGNOSIS — M25.561 CHRONIC PAIN OF RIGHT KNEE: Primary | ICD-10-CM

## 2022-10-21 PROCEDURE — 97116 GAIT TRAINING THERAPY: CPT | Mod: GP

## 2022-10-21 PROCEDURE — 97110 THERAPEUTIC EXERCISES: CPT | Mod: GP

## 2022-10-28 ENCOUNTER — HOSPITAL ENCOUNTER (OUTPATIENT)
Dept: PHYSICAL THERAPY | Facility: REHABILITATION | Age: 62
Discharge: HOME | End: 2022-10-28
Payer: COMMERCIAL

## 2022-10-28 DIAGNOSIS — G89.29 CHRONIC PAIN OF RIGHT KNEE: Primary | ICD-10-CM

## 2022-10-28 DIAGNOSIS — M25.561 CHRONIC PAIN OF RIGHT KNEE: Primary | ICD-10-CM

## 2022-10-28 PROCEDURE — 97110 THERAPEUTIC EXERCISES: CPT | Mod: GP

## 2022-10-28 NOTE — TELEPHONE ENCOUNTER
Author is unable to speak to the location and/or status of this form. IF received will prep and send.

## 2022-10-31 NOTE — TELEPHONE ENCOUNTER
Patient grand daughter calls to check on status of the Medical Opinion form. Has for been completed yet. Form was dropped off on 10/18/22. Please call caller with updates status regarding form.

## 2022-10-31 NOTE — TELEPHONE ENCOUNTER
Can primary care team review and see if they have this form. Author does not have this form. Thank you.

## 2022-11-04 ENCOUNTER — HOSPITAL ENCOUNTER (OUTPATIENT)
Dept: PHYSICAL THERAPY | Facility: REHABILITATION | Age: 62
Discharge: HOME | End: 2022-11-04
Payer: COMMERCIAL

## 2022-11-04 PROCEDURE — 97110 THERAPEUTIC EXERCISES: CPT | Mod: GP

## 2022-11-04 PROCEDURE — 97112 NEUROMUSCULAR REEDUCATION: CPT | Mod: GP

## 2022-11-04 NOTE — PROGRESS NOTES
[facility]    Outpatient Physical Therapy Discharge Note  Patient: Breana COOPER Dalila  : 1960    Beginning/End Dates of Reporting Period:  22 to 22  Referring Provider: Christiano Hall MD    Therapy Diagnosis: Bilateral knee pain        22 1400   Signing Clinician's Name / Credentials   Signing clinician's name / credentials Janine Verdin, PT, DPT   Session Number   Session Number    Progress Note/Recertification   Progress Note Due Date   (10th visit)   Recertification Due Date 22   Adult Goals   PT Ortho Eval Goals 1;2;3   Ortho Goal 1   Goal Identifier HEP   Goal Description Pt will be independent with HEP to improve mobility and decrease pain.   Target Date 22   Ortho Goal 2   Goal Identifier Walking   Goal Description Pt will report ability to walk greater than 2 block with less than 3/10 pain to improve functional mobility and increase tolerance completing ADL's, recreational activities and work related tasks.   Goal Progress In progress - able to tolerate 1-2 mintutes of walking   Target Date 22   Ortho Goal 3   Goal Identifier Stairs   Goal Description Pt will report ability to ambulate up and down 1 flight of stairs with less than 3/10 pain to improve functional mobility and increase tolerance completing ADL's, recreational activities and work related tasks.   Goal Progress In progress- pain 5-7/10   Target Date 22       Present Yes   Language Other  (oJssy)    Comments Ipad, son present as well   Subjective Report   Subjective Report Pt arrives today reporting feeling a little bit better, rating pain level with walking is rated at level 9/10, reports regularly performing HEP every morning that it has helped with function however not with pain.   Objective Measures   Objective Measures Objective Measure 1;Objective Measure  2;Objective Measure 3   Objective Measure 1   Objective Measure Gait   Details Pt ambulates with FWW, continues to demonstrate decreased WB on RLE, significant improvement in stride length and gait mechanics from previous ambulation with SEC.   Objective Measure 2   Objective Measure Pain   Details Per pt 9/10 pain with all functional mobility   Objective Measure 3   Objective Measure STS   Details Pt able to perform 5 STS partial ROM secondary to pain with WB. Increased time to complete 30+ seconds)   Treatment Interventions   Interventions Therapeutic Procedure/Exercise;Neuromuscular Re-education   Therapeutic Procedure/exercise   Therapeutic Procedures: strength, endurance, ROM, flexibillity minutes (46591) 25   Skilled Intervention Patient education, Verbal and tactile cues utilized to facilitate correct exercise technique. Demo of exercises. Updated HEP   Patient Response Tolerated fair, knee pain however able to tolerate, more WB exercises with rest breaks provided as needed   Treatment Detail For LE functional strength, ROM and activity tolerance: Nustep level 3, 7 minutes, steps 596 with subjective information gathered. Significant time educating patient on benefit of exploring additional options with PCP including potentially injection and surgery for improved QOL - pt verbalizes understanding. For increased strengthening in WB - Repeated STS 2 x 10; emphasis on decreased BUE support as able. Performed with BUE at handrail: Standing heel to toe raises. Standing hip abduction x 10 B, 2 sec for glute activation; standing hip extension x 10 B observed decreased ability to WB single leg on RLE secondary to pain. Seated LAQ - x 10 B; emphasis on quad activation and slower performance. Seated HS stretch x10 B.   Neuromuscular Re-education   Neuromuscular re-ed of mvmt, balance, coord, kinesthetic sense, posture, proprioception minutes (38969) 15   Skilled Intervention To promote multidirectional movements,  improve dynamic balance   Patient Response Fair tolerance, R knee pain and required seated rest breaks between exercises   Treatment Detail Performed with BUE in frame of FWW: Standing marches x 20 B, encouraged increased single leg stance time and alternating feet. Heel to toe raises. High knee marches along wall with unilateral support 20 ft x 4; cues for high knees; increased difficulty with WB onto RLE secondary to pain and weakness. Side stepping at rail 20 ft x 4; cues for increased foot clearance.   Equipment Needs   Equipment Needs FWW   Education   Learner Patient;Family   Readiness Eager;Acceptance   Method Booklet/handout;Demonstration   Response Verbalizes Understanding;Demonstrates Understanding   Plan   Homework PTRx (printout)   Home program Ankle pumps, quad sets, short arc quads, SLR, bridge, seated roll outs, standing marches, heel to toe raises, seated HS strech, standing marches, standing hip abd, standing hip ext, side stepping   Plan for next session Therapeutic exercise: strengthening, ROM, flexibility; neuro re-ed: gait and balance training; manual therapy/STM/massage.   Comments   Comments Pt returns for last appointment with B knee pain R>L continues to demonstrate small progress, increased activity tolerance and ability to perform WB exercises however no signifcant changes regarding knee pain with PT. Encouraged pt to follow up with PCP for additional options. Likely pt would benefit from more agressive non conservative treatments as not making significant gains with therapy and concluding this bout of therapy.   Total Session Time   Timed Code Treatment Minutes 40   Total Treatment Time (sum of timed and untimed services) 40   Medicare Claim Information   Medical Diagnosis Primary osteoarthritis of left knee, post-traumatic osteoarthritis of right knee   PT Diagnosis Bilateral knee pain     Plan:  Discharge from therapy.    Discharge:    Reason for Discharge: No further expectation of  progress.    Equipment Issued: none    Discharge Plan: Patient to continue home program. Pt will follow up with PCP for alternative options.

## 2023-01-05 ENCOUNTER — TELEPHONE (OUTPATIENT)
Dept: MAMMOGRAPHY | Facility: CLINIC | Age: 63
End: 2023-01-05

## 2023-01-05 NOTE — TELEPHONE ENCOUNTER
Patient Quality Outreach    Patient is due for the following:   Breast Cancer Screening - Mammogram    Next Steps:   Patient was scheduled for mammogram    Type of outreach:    Phone, spoke to patient/parent. scheduled 1/11 at 2:30    Next Steps:  Reach out within 90 days via Phone.    Max number of attempts reached: No. Will try again in 90 days if patient still on fail list.    Questions for provider review:    None     KILEY Alarcon  Chart routed to Care Team.

## 2023-01-12 ENCOUNTER — OFFICE VISIT (OUTPATIENT)
Dept: FAMILY MEDICINE | Facility: CLINIC | Age: 63
End: 2023-01-12
Payer: COMMERCIAL

## 2023-01-12 ENCOUNTER — ANCILLARY PROCEDURE (OUTPATIENT)
Dept: MAMMOGRAPHY | Facility: CLINIC | Age: 63
End: 2023-01-12
Attending: FAMILY MEDICINE
Payer: COMMERCIAL

## 2023-01-12 VITALS
DIASTOLIC BLOOD PRESSURE: 88 MMHG | BODY MASS INDEX: 34.66 KG/M2 | OXYGEN SATURATION: 100 % | SYSTOLIC BLOOD PRESSURE: 120 MMHG | RESPIRATION RATE: 16 BRPM | TEMPERATURE: 98.3 F | HEART RATE: 112 BPM | HEIGHT: 60 IN | WEIGHT: 176.56 LBS

## 2023-01-12 DIAGNOSIS — M25.50 MULTIPLE JOINT PAIN: ICD-10-CM

## 2023-01-12 DIAGNOSIS — E78.5 HYPERLIPIDEMIA LDL GOAL <130: ICD-10-CM

## 2023-01-12 DIAGNOSIS — M17.0 PRIMARY OSTEOARTHRITIS OF BOTH KNEES: ICD-10-CM

## 2023-01-12 DIAGNOSIS — I10 BENIGN ESSENTIAL HYPERTENSION: ICD-10-CM

## 2023-01-12 DIAGNOSIS — Z23 ENCOUNTER FOR IMMUNIZATION: ICD-10-CM

## 2023-01-12 DIAGNOSIS — Z12.31 VISIT FOR SCREENING MAMMOGRAM: ICD-10-CM

## 2023-01-12 DIAGNOSIS — Z76.0 ENCOUNTER FOR MEDICATION REFILL: ICD-10-CM

## 2023-01-12 DIAGNOSIS — M25.461 BILATERAL KNEE SWELLING: ICD-10-CM

## 2023-01-12 DIAGNOSIS — M25.462 BILATERAL KNEE SWELLING: ICD-10-CM

## 2023-01-12 DIAGNOSIS — E66.01 MORBID OBESITY (H): Primary | ICD-10-CM

## 2023-01-12 PROCEDURE — 90682 RIV4 VACC RECOMBINANT DNA IM: CPT | Performed by: FAMILY MEDICINE

## 2023-01-12 PROCEDURE — 77067 SCR MAMMO BI INCL CAD: CPT | Mod: TC | Performed by: RADIOLOGY

## 2023-01-12 PROCEDURE — 90471 IMMUNIZATION ADMIN: CPT | Performed by: FAMILY MEDICINE

## 2023-01-12 PROCEDURE — 99214 OFFICE O/P EST MOD 30 MIN: CPT | Mod: 25 | Performed by: FAMILY MEDICINE

## 2023-01-12 RX ORDER — HYDROCHLOROTHIAZIDE 25 MG/1
25 TABLET ORAL DAILY
Qty: 30 TABLET | Refills: 11 | Status: SHIPPED | OUTPATIENT
Start: 2023-01-12 | End: 2023-09-21

## 2023-01-12 RX ORDER — CELECOXIB 200 MG/1
200 CAPSULE ORAL DAILY
Qty: 30 CAPSULE | Refills: 11 | Status: SHIPPED | OUTPATIENT
Start: 2023-01-12 | End: 2023-05-12

## 2023-01-12 RX ORDER — ATORVASTATIN CALCIUM 20 MG/1
20 TABLET, FILM COATED ORAL DAILY
Qty: 30 TABLET | Refills: 11 | Status: SHIPPED | OUTPATIENT
Start: 2023-01-12 | End: 2023-12-07

## 2023-01-12 RX ORDER — LISINOPRIL 40 MG/1
40 TABLET ORAL DAILY
Qty: 30 TABLET | Refills: 11 | Status: SHIPPED | OUTPATIENT
Start: 2023-01-12 | End: 2023-09-21

## 2023-01-12 RX ORDER — AMLODIPINE BESYLATE 10 MG/1
10 TABLET ORAL DAILY
Qty: 30 TABLET | Refills: 11 | Status: SHIPPED | OUTPATIENT
Start: 2023-01-12 | End: 2023-09-21

## 2023-01-12 NOTE — PROGRESS NOTES
"  Assessment & Plan     Morbid obesity (H)    Patient states that neither her joint pain is decreased in her knee pain is decreased she will try to exercise or at least try to walk to get her weight down    Multiple joint pain    Joint pain is improved as she is not working she does not feel sore in the morning if she can sleep better.    Benign essential hypertension    Blood pressure well controlled she has had no side effects from medication I refilled them today  - hydrochlorothiazide (HYDRODIURIL) 25 MG tablet; Take 1 tablet (25 mg) by mouth daily  - lisinopril (ZESTRIL) 40 MG tablet; Take 1 tablet (40 mg) by mouth daily  - amLODIPine (NORVASC) 10 MG tablet; Take 1 tablet (10 mg) by mouth daily    Hyperlipidemia LDL goal <130    On atorvastatin no side effects noted recheck cholesterol within 6 months  - atorvastatin (LIPITOR) 20 MG tablet; Take 1 tablet (20 mg) by mouth daily    Encounter for immunization    Agrees for immunization    Primary osteoarthritis of both knees    Improvement in the pain in both knees there is no swelling she is still taking Celebrex and supplemented that with Voltaren if needed    Encounter for medication refill      - atorvastatin (LIPITOR) 20 MG tablet; Take 1 tablet (20 mg) by mouth daily    Bilateral knee swelling      - diclofenac (VOLTAREN) 1 % topical gel; Apply 4 g topically 4 times daily  - celecoxib (CELEBREX) 200 MG capsule; Take 1 capsule (200 mg) by mouth daily             BMI:   Estimated body mass index is 35.06 kg/m  as calculated from the following:    Height as of this encounter: 1.511 m (4' 11.5\").    Weight as of this encounter: 80.1 kg (176 lb 9 oz).           Return in about 4 months (around 5/12/2023) for hypertension.    Jose Geiger MD  [facility] VALERIANO oTussaint is a 63 year old accompanied by her daughter, presenting for the following health issues:  Follow Up (HTN)  Follow-up cholesterol issues follow-up body ache follow-up knee " "pain    History of presenting illness  64-year-old female with a history of morbid obesity, polyarthralgia, bilateral osteoarthritis of the knees, hypertension and hyperlipidemia here for follow-up I saw her more than 3 months ago she reports that her knee pain is better there is no longer swollen and her joint pain is better she is no longer working.  She notes no dizziness.  She has been taking all her medications faithfully and has brought them and she is sleeping better.  She still has not started an exercise program yet.    History of Present Illness       Hypertension: She presents for follow up of hypertension.  She does not check blood pressure  regularly outside of the clinic. Outpatient blood pressures have not been over 140/90. She follows a low salt diet.               Review of Systems   Constitutional, HEENT, cardiovascular, pulmonary, gi and gu systems are negative, except as otherwise noted.      Objective      /88   Pulse 112   Temp 98.3  F (36.8  C) (Oral)   Resp 16   Ht 1.511 m (4' 11.5\")   Wt 80.1 kg (176 lb 9 oz)   SpO2 100%   BMI 35.06 kg/m      Physical Exam   GENERAL: healthy, alert and no distress  EYES: Eyes grossly normal to inspection, PERRL and conjunctivae and sclerae normal  RESP: lungs clear to auscultation - no rales, rhonchi or wheezes  CV: regular rate and rhythm, normal S1 S2, no S3 or S4, no murmur, click or rub, no peripheral edema and peripheral pulses strong  MS: no gross musculoskeletal defects noted, no edema, no tenderness over the patella bilaterally.  Drawer test and Lachman test bilaterally are negative.  SKIN: no suspicious lesions or rashes  NEURO: Normal strength and tone, mentation intact and speech normal  PSYCH: mentation appears normal, affect normal/bright            "

## 2023-01-13 ENCOUNTER — ANCILLARY PROCEDURE (OUTPATIENT)
Dept: MAMMOGRAPHY | Facility: CLINIC | Age: 63
End: 2023-01-13
Attending: FAMILY MEDICINE
Payer: COMMERCIAL

## 2023-01-13 DIAGNOSIS — N64.89 BREAST ASYMMETRY: ICD-10-CM

## 2023-01-13 PROCEDURE — 77061 BREAST TOMOSYNTHESIS UNI: CPT | Mod: LT

## 2023-04-26 ENCOUNTER — TELEPHONE (OUTPATIENT)
Dept: FAMILY MEDICINE | Facility: CLINIC | Age: 63
End: 2023-04-26
Payer: COMMERCIAL

## 2023-04-26 NOTE — TELEPHONE ENCOUNTER
Patient Quality Outreach    Patient is due for the following:   Cervical Cancer Screening - PAP Needed    Next Steps:   Please call patient back and schedule an appt.     Type of outreach:     Phone, No answer/Busy      Questions for provider review:    None     Lore Diaz MA

## 2023-05-09 NOTE — TELEPHONE ENCOUNTER
Patient Quality Outreach    Patient is due for the following:   Cervical Cancer Screening - PAP Needed    Next Steps:   Patient was scheduled for Px and Pap for 6/14/2023.    Type of outreach:    Phone, spoke to patient/parent. Pt schedule Px and Pap.      Questions for provider review:    None           Lore Diaz MA

## 2023-05-12 ENCOUNTER — OFFICE VISIT (OUTPATIENT)
Dept: FAMILY MEDICINE | Facility: CLINIC | Age: 63
End: 2023-05-12
Payer: COMMERCIAL

## 2023-05-12 VITALS
HEART RATE: 91 BPM | SYSTOLIC BLOOD PRESSURE: 105 MMHG | HEIGHT: 60 IN | OXYGEN SATURATION: 97 % | TEMPERATURE: 99 F | BODY MASS INDEX: 34.25 KG/M2 | RESPIRATION RATE: 16 BRPM | DIASTOLIC BLOOD PRESSURE: 69 MMHG | WEIGHT: 174.44 LBS

## 2023-05-12 DIAGNOSIS — E78.5 HYPERLIPIDEMIA LDL GOAL <130: ICD-10-CM

## 2023-05-12 DIAGNOSIS — I10 BENIGN ESSENTIAL HYPERTENSION: Primary | ICD-10-CM

## 2023-05-12 DIAGNOSIS — M25.462 BILATERAL KNEE SWELLING: ICD-10-CM

## 2023-05-12 DIAGNOSIS — M17.0 PRIMARY OSTEOARTHRITIS OF BOTH KNEES: ICD-10-CM

## 2023-05-12 DIAGNOSIS — M25.461 BILATERAL KNEE SWELLING: ICD-10-CM

## 2023-05-12 DIAGNOSIS — M25.50 MULTIPLE JOINT PAIN: ICD-10-CM

## 2023-05-12 LAB
ALBUMIN SERPL BCG-MCNC: 4.4 G/DL (ref 3.5–5.2)
ALP SERPL-CCNC: 64 U/L (ref 35–104)
ALT SERPL W P-5'-P-CCNC: 45 U/L (ref 10–35)
ANION GAP SERPL CALCULATED.3IONS-SCNC: 17 MMOL/L (ref 7–15)
AST SERPL W P-5'-P-CCNC: 29 U/L (ref 10–35)
BILIRUB SERPL-MCNC: 0.3 MG/DL
BUN SERPL-MCNC: 22.2 MG/DL (ref 8–23)
CALCIUM SERPL-MCNC: 9.9 MG/DL (ref 8.8–10.2)
CHLORIDE SERPL-SCNC: 105 MMOL/L (ref 98–107)
CHOLEST SERPL-MCNC: 277 MG/DL
CREAT SERPL-MCNC: 0.77 MG/DL (ref 0.51–0.95)
DEPRECATED HCO3 PLAS-SCNC: 25 MMOL/L (ref 22–29)
GFR SERPL CREATININE-BSD FRML MDRD: 87 ML/MIN/1.73M2
GLUCOSE SERPL-MCNC: 97 MG/DL (ref 70–99)
HDLC SERPL-MCNC: 50 MG/DL
LDLC SERPL CALC-MCNC: ABNORMAL MG/DL
LDLC SERPL DIRECT ASSAY-MCNC: 163 MG/DL
NONHDLC SERPL-MCNC: 227 MG/DL
POTASSIUM SERPL-SCNC: 4 MMOL/L (ref 3.4–5.3)
PROT SERPL-MCNC: 7.9 G/DL (ref 6.4–8.3)
SODIUM SERPL-SCNC: 147 MMOL/L (ref 136–145)
TRIGL SERPL-MCNC: 511 MG/DL

## 2023-05-12 PROCEDURE — 80061 LIPID PANEL: CPT | Performed by: FAMILY MEDICINE

## 2023-05-12 PROCEDURE — 99214 OFFICE O/P EST MOD 30 MIN: CPT | Performed by: FAMILY MEDICINE

## 2023-05-12 PROCEDURE — 36415 COLL VENOUS BLD VENIPUNCTURE: CPT | Performed by: FAMILY MEDICINE

## 2023-05-12 PROCEDURE — 83721 ASSAY OF BLOOD LIPOPROTEIN: CPT | Mod: 59 | Performed by: FAMILY MEDICINE

## 2023-05-12 PROCEDURE — 80053 COMPREHEN METABOLIC PANEL: CPT | Performed by: FAMILY MEDICINE

## 2023-05-12 RX ORDER — CELECOXIB 200 MG/1
200 CAPSULE ORAL 2 TIMES DAILY
Qty: 60 CAPSULE | Refills: 11 | Status: SHIPPED | OUTPATIENT
Start: 2023-05-12 | End: 2023-07-21

## 2023-05-12 NOTE — PROGRESS NOTES
"  Assessment & Plan     Benign essential hypertension  Blood pressures well controlled today she actually feels weak and after reviewing her blood pressure I am going to have her hold her Norvasc for approximately 10 days and recheck her blood pressure here.  She understands she needs to continue the hydrochlorothiazide lisinopril CMP to be done today.  - Comprehensive metabolic panel (BMP + Alb, Alk Phos, ALT, AST, Total. Bili, TP); Future  - Comprehensive metabolic panel (BMP + Alb, Alk Phos, ALT, AST, Total. Bili, TP)    Hyperlipidemia LDL goal <130    Been more than a year since her last cholesterol levels checked  - Lipid panel reflex to direct LDL Non-fasting; Future  - Comprehensive metabolic panel (BMP + Alb, Alk Phos, ALT, AST, Total. Bili, TP); Future  - Lipid panel reflex to direct LDL Non-fasting  - Comprehensive metabolic panel (BMP + Alb, Alk Phos, ALT, AST, Total. Bili, TP)    Primary osteoarthritis of both knees    Severe osteoarthritis had x-rays and 2022 showing degenerative arthritis with an effusion on the left knee.  Increase Celebrex to see if that will help she is requesting a wheelchair but I cautioned her against this as this will increase her chances of worsening obesity and she does not have sufficient strength to push a wheelchair.    Multiple joint pain        Bilateral knee swelling    Celebrex augmentation trial if she does not improve would consider bilateral steroid injections agrees with the plan  - celecoxib (CELEBREX) 200 MG capsule; Take 1 capsule (200 mg) by mouth 2 times daily             BMI:   Estimated body mass index is 34.64 kg/m  as calculated from the following:    Height as of this encounter: 1.511 m (4' 11.5\").    Weight as of this encounter: 79.1 kg (174 lb 7 oz).           Jose Geiger MD  [facility] VALERIANO Toussaint is a 63 year old, presenting for the following health issues:  Follow Up        5/12/2023     1:27 PM   Additional Questions " "  Roomed by Nikki tejeda   Accompanied by niece Destin Boothe     History of Present Illness       Reason for visit:  Follow up                Review of Systems   Constitutional, HEENT, cardiovascular, pulmonary, gi and gu systems are negative, except as otherwise noted.      Objective    /69   Pulse 91   Temp 99  F (37.2  C) (Oral)   Resp 16   Ht 1.511 m (4' 11.5\")   Wt 79.1 kg (174 lb 7 oz)   SpO2 97%   BMI 34.64 kg/m    Body mass index is 34.64 kg/m .  Physical Exam   GENERAL: healthy, alert and no distress  EYES: Eyes grossly normal to inspection, PERRL and conjunctivae and sclerae normal  NECK: no adenopathy, no asymmetry, masses, or scars and thyroid normal to palpation  RESP: lungs clear to auscultation - no rales, rhonchi or wheezes  CV: regular rate and rhythm, normal S1 S2, no S3 or S4, no murmur, click or rub, no peripheral edema and peripheral pulses strong  MS: Decreased extension of the left knee noted to 10 degrees of flexion to 30 degrees, Lachman's test is negative drawer tests are negative.  No patellar tenderness noted.  Significant discomfort noted when the patient attempts to walk.  No abnormal patellar tracking noted  SKIN: no suspicious lesions or rashes  NEURO: Normal strength and tone, mentation intact and speech normal  PSYCH: mentation appears normal, affect normal/bright                    "

## 2023-05-17 ENCOUNTER — TELEPHONE (OUTPATIENT)
Dept: FAMILY MEDICINE | Facility: CLINIC | Age: 63
End: 2023-05-17
Payer: COMMERCIAL

## 2023-05-17 DIAGNOSIS — E78.5 HYPERLIPIDEMIA LDL GOAL <130: Primary | ICD-10-CM

## 2023-05-17 RX ORDER — ATORVASTATIN CALCIUM 40 MG/1
40 TABLET, FILM COATED ORAL DAILY
Qty: 90 TABLET | Refills: 3 | Status: SHIPPED | OUTPATIENT
Start: 2023-05-17 | End: 2023-05-30

## 2023-05-17 NOTE — TELEPHONE ENCOUNTER
----- Message from Jose Geiger MD sent at 5/17/2023  7:23 AM CDT -----  Please inform patient that her cholesterol still remains high I am sending a new cholesterol medication which she can start next month she should take her existing cholesterol medication twice daily until it is finished she is currently taking atorva  statin 20 mg daily

## 2023-05-17 NOTE — RESULT ENCOUNTER NOTE
Please inform patient that her cholesterol still remains high I am sending a new cholesterol medication which she can start next month she should take her existing cholesterol medication twice daily until it is finished she is currently taking atorvastatin 20 mg daily

## 2023-05-17 NOTE — TELEPHONE ENCOUNTER
Called patient/niece, Tl Conner (C2C on file) with assistance of an  to inform niece of test result result and recommendation below.  Pharmacy medication sent to verified with family.      BERONICA Al, RN  [facility]        Jose Geiger MD  [facility]/Ob Support Pool  Please inform patient that her cholesterol still remains high I am sending a new cholesterol medication which she can start next month she should take her existing cholesterol medication twice daily until it is finished she is currently taking atorvastatin 20 mg daily

## 2023-05-30 ENCOUNTER — OFFICE VISIT (OUTPATIENT)
Dept: FAMILY MEDICINE | Facility: CLINIC | Age: 63
End: 2023-05-30
Payer: COMMERCIAL

## 2023-05-30 VITALS
HEART RATE: 96 BPM | DIASTOLIC BLOOD PRESSURE: 86 MMHG | TEMPERATURE: 97.2 F | HEIGHT: 60 IN | WEIGHT: 175 LBS | BODY MASS INDEX: 34.36 KG/M2 | RESPIRATION RATE: 16 BRPM | SYSTOLIC BLOOD PRESSURE: 146 MMHG | OXYGEN SATURATION: 97 %

## 2023-05-30 DIAGNOSIS — M25.462 BILATERAL KNEE SWELLING: ICD-10-CM

## 2023-05-30 DIAGNOSIS — I10 BENIGN ESSENTIAL HYPERTENSION: ICD-10-CM

## 2023-05-30 DIAGNOSIS — E66.01 MORBID OBESITY (H): Primary | ICD-10-CM

## 2023-05-30 DIAGNOSIS — E78.5 HYPERLIPIDEMIA LDL GOAL <130: ICD-10-CM

## 2023-05-30 DIAGNOSIS — M25.461 BILATERAL KNEE SWELLING: ICD-10-CM

## 2023-05-30 DIAGNOSIS — M17.0 PRIMARY OSTEOARTHRITIS OF BOTH KNEES: ICD-10-CM

## 2023-05-30 PROCEDURE — 99214 OFFICE O/P EST MOD 30 MIN: CPT | Performed by: FAMILY MEDICINE

## 2023-05-30 RX ORDER — ATORVASTATIN CALCIUM 40 MG/1
40 TABLET, FILM COATED ORAL DAILY
Qty: 90 TABLET | Refills: 3 | Status: SHIPPED | OUTPATIENT
Start: 2023-05-30 | End: 2023-12-07

## 2023-05-30 NOTE — PROGRESS NOTES
DME (Durable Medical Equipment) Orders and Documentation  Orders Placed This Encounter   Procedures     Home Blood Pressure Monitor Order for DME - ONLY FOR DME      The patient was assessed and it was determined the patient is in need of the following listed DME Supplies/Equipment. Please complete supporting documentation below to demonstrate medical necessity.      DME All Other Item(s) Documentation    List reason for need and supporting documentation for medical necessity below for each DME item.     1. Has hypertension          Answers for HPI/ROS submitted by the patient on 5/30/2023  Do you check your blood pressure regularly outside of the clinic?: No  Are your blood pressures ever more than 140 on the top number (systolic) OR more than 90 on the bottom number (diastolic)? (For example, greater than 140/90): No  Are you following a low salt diet?: No

## 2023-05-30 NOTE — PATIENT INSTRUCTIONS
It was a pleasure to see you here in the clinic today      Please note that your blood pressure was not well controlled and I have prescribed a home blood pressure cuff for you to try to check your blood pressures at home he should try to do this at the same day you do not need to do it more than once a day.  Please continue your current medications and take half a tablet of the medicine that you stop before called amlodipine so you should take 5 mg of this medicine the lisinopril and hydrochlorothiazide for your blood pressure.      I will see you again in 6 weeks.

## 2023-05-30 NOTE — PROGRESS NOTES
"  Assessment & Plan     Morbid obesity (H)  Discussed her weight and the fact that she needs to keep active to keep the weight off her daughter-in-law states that she is always working in the kitchen and is not sedentary at all    Bilateral knee swelling    Minor improvement with Celebrex dosing to 2 capsules/day we will continue to monitor symptoms denies any side effects from the medication    Benign essential hypertension    Blood pressure is elevated she notes no swelling in her feet or ankles restart the amlodipine at only 5 mg a day continue with hydrochlorothiazide and lisinopril.  - Home Blood Pressure Monitor Order for DME - ONLY FOR DME    Hyperlipidemia LDL goal <130    She has tolerated the dose increase of atorvastatin to 40 mg a day.  - atorvastatin (LIPITOR) 40 MG tablet; Take 1 tablet (40 mg) by mouth daily    Primary osteoarthritis of both knees    Currently she will try the Celebrex if no improvement is noted we could proceed to corticosteroid injections.               BMI:   Estimated body mass index is 34.75 kg/m  as calculated from the following:    Height as of this encounter: 1.511 m (4' 11.5\").    Weight as of this encounter: 79.4 kg (175 lb).           Jose Geiger MD  [facility] VALERIANO Toussaint is a 63 year old, presenting for the following health issues:  Hypertension (Check )        5/30/2023    12:49 PM   Additional Questions   Roomed by Lore Diaz MA   Accompanied by Daughter in law     HPI   63-year-old female here with a history of morbid obesity, hyperlipidemia hypertension bilateral knee pain here for follow-up of blood pressure she states that she feels better now that she stopped the other medications she says her knees still hurt but she has been able to ambulate and help around the home.  She says her sleep has been understood by her knee pain.  She has brought her medications in for review          Review of Systems   Constitutional, HEENT, " "cardiovascular, pulmonary, gi and gu systems are negative, except as otherwise noted.      Objective      BP (!) 146/86   Pulse 96   Temp 97.2  F (36.2  C) (Temporal)   Resp 16   Ht 1.511 m (4' 11.5\")   Wt 79.4 kg (175 lb)   SpO2 97%   BMI 34.75 kg/m      Physical Exam   GENERAL: healthy, alert and no distress  EYES: Eyes grossly normal to inspection, PERRL and conjunctivae and sclerae normal  NECK: no adenopathy, no asymmetry, masses, or scars and thyroid normal to palpation  RESP: lungs clear to auscultation - no rales, rhonchi or wheezes  CV: regular rate and rhythm, normal S1 S2, no S3 or S4, no murmur, click or rub, no peripheral edema and peripheral pulses strong  MS: Tenderness elicited when I palpate her knees bilaterally she is exquisitely tender on the left patella.  Drawer tests however were negative bilaterally Lachman's test negative bilaterally.  SKIN: no suspicious lesions or rashes  NEURO: Normal strength and tone, sensory exam grossly normal and mentation intact  PSYCH: mentation appears normal, affect normal/bright                    "

## 2023-06-14 ENCOUNTER — OFFICE VISIT (OUTPATIENT)
Dept: FAMILY MEDICINE | Facility: CLINIC | Age: 63
End: 2023-06-14
Payer: COMMERCIAL

## 2023-06-14 VITALS
DIASTOLIC BLOOD PRESSURE: 68 MMHG | BODY MASS INDEX: 33.28 KG/M2 | WEIGHT: 169.5 LBS | HEART RATE: 120 BPM | TEMPERATURE: 97.8 F | HEIGHT: 60 IN | SYSTOLIC BLOOD PRESSURE: 106 MMHG | RESPIRATION RATE: 16 BRPM | OXYGEN SATURATION: 97 %

## 2023-06-14 DIAGNOSIS — Z12.4 CERVICAL CANCER SCREENING: Primary | ICD-10-CM

## 2023-06-14 PROCEDURE — 99213 OFFICE O/P EST LOW 20 MIN: CPT | Performed by: FAMILY MEDICINE

## 2023-06-14 PROCEDURE — G0145 SCR C/V CYTO,THINLAYER,RESCR: HCPCS | Performed by: FAMILY MEDICINE

## 2023-06-14 PROCEDURE — 87624 HPV HI-RISK TYP POOLED RSLT: CPT | Performed by: FAMILY MEDICINE

## 2023-06-14 ASSESSMENT — ENCOUNTER SYMPTOMS
ARTHRALGIAS: 0
HEADACHES: 0
PALPITATIONS: 0
CONSTIPATION: 0
NERVOUS/ANXIOUS: 0
DIZZINESS: 0
PARESTHESIAS: 0
SHORTNESS OF BREATH: 0
ABDOMINAL PAIN: 0
SORE THROAT: 0
FREQUENCY: 0
HEARTBURN: 0
HEMATOCHEZIA: 0
COUGH: 0
DIARRHEA: 0
DYSURIA: 0
WEAKNESS: 1
NAUSEA: 0
FEVER: 0
MYALGIAS: 0
JOINT SWELLING: 0
CHILLS: 0
HEMATURIA: 0
EYE PAIN: 0

## 2023-06-14 NOTE — PROGRESS NOTES
"Breana was seen today for physical.    Diagnoses and all orders for this visit:    Cervical cancer screening:   -     Pap Screen with HPV - recommended age 30 - 65 years          Subjective   Breana is a 63 year old, presenting for the following health issues:        2023     2:37 PM   Additional Questions   Roomed by Terri Babb   Accompanied by Daughter in Law : Paw     HPI     2 children,   Postmenopausal- 3 years ago  Denies any vaginal discharge, urinary frequency/urgency, vaginal odor  Denies any pelvic pain  She has never had pap smear previously      Review of Systems         Objective    /68 (BP Location: Left arm, Patient Position: Sitting, Cuff Size: Adult Regular)   Pulse 120   Temp 97.8  F (36.6  C) (Oral)   Resp 16   Ht 1.511 m (4' 11.5\")   Wt 76.9 kg (169 lb 8 oz)   SpO2 97%   BMI 33.66 kg/m    Body mass index is 33.66 kg/m .  Physical Exam   : small erythematous circular area on cervix approximately 9 o'clock, otherwise normal. Cystocele.                 "

## 2023-06-20 LAB
BKR LAB AP GYN ADEQUACY: NORMAL
BKR LAB AP GYN INTERPRETATION: NORMAL
BKR LAB AP HPV REFLEX: NORMAL
BKR LAB AP PREVIOUS ABNORMAL: NORMAL
PATH REPORT.COMMENTS IMP SPEC: NORMAL
PATH REPORT.COMMENTS IMP SPEC: NORMAL
PATH REPORT.RELEVANT HX SPEC: NORMAL

## 2023-06-21 LAB
HUMAN PAPILLOMA VIRUS 16 DNA: NEGATIVE
HUMAN PAPILLOMA VIRUS 18 DNA: NEGATIVE
HUMAN PAPILLOMA VIRUS FINAL DIAGNOSIS: NORMAL
HUMAN PAPILLOMA VIRUS OTHER HR: NEGATIVE

## 2023-06-22 PROBLEM — Z12.4 CERVICAL CANCER SCREENING: Status: ACTIVE | Noted: 2023-06-22

## 2023-07-21 ENCOUNTER — OFFICE VISIT (OUTPATIENT)
Dept: FAMILY MEDICINE | Facility: CLINIC | Age: 63
End: 2023-07-21
Payer: COMMERCIAL

## 2023-07-21 VITALS
SYSTOLIC BLOOD PRESSURE: 109 MMHG | BODY MASS INDEX: 32.85 KG/M2 | DIASTOLIC BLOOD PRESSURE: 74 MMHG | OXYGEN SATURATION: 98 % | TEMPERATURE: 99.3 F | HEIGHT: 60 IN | HEART RATE: 117 BPM | WEIGHT: 167.31 LBS | RESPIRATION RATE: 16 BRPM

## 2023-07-21 DIAGNOSIS — M25.461 BILATERAL KNEE SWELLING: ICD-10-CM

## 2023-07-21 DIAGNOSIS — I10 BENIGN ESSENTIAL HYPERTENSION: Primary | ICD-10-CM

## 2023-07-21 DIAGNOSIS — E66.01 MORBID OBESITY (H): ICD-10-CM

## 2023-07-21 DIAGNOSIS — M17.0 PRIMARY OSTEOARTHRITIS OF BOTH KNEES: ICD-10-CM

## 2023-07-21 DIAGNOSIS — K29.00 OTHER ACUTE GASTRITIS WITHOUT HEMORRHAGE: ICD-10-CM

## 2023-07-21 DIAGNOSIS — E78.5 HYPERLIPIDEMIA LDL GOAL <130: ICD-10-CM

## 2023-07-21 DIAGNOSIS — M25.462 BILATERAL KNEE SWELLING: ICD-10-CM

## 2023-07-21 PROCEDURE — 99214 OFFICE O/P EST MOD 30 MIN: CPT | Performed by: FAMILY MEDICINE

## 2023-07-21 RX ORDER — CELECOXIB 200 MG/1
200 CAPSULE ORAL DAILY
Qty: 60 CAPSULE | Refills: 11 | Status: SHIPPED | OUTPATIENT
Start: 2023-07-21 | End: 2024-10-03

## 2023-07-21 RX ORDER — DULOXETIN HYDROCHLORIDE 30 MG/1
30 CAPSULE, DELAYED RELEASE ORAL DAILY
Qty: 30 CAPSULE | Refills: 4 | Status: SHIPPED | OUTPATIENT
Start: 2023-07-21 | End: 2023-09-21

## 2023-07-21 NOTE — PATIENT INSTRUCTIONS
Was a pleasure to see you and your family today here in clinic      Please note I have made some changes to your medication you should continue to take the same blood pressure medications however I am not sure to take the Celebrex only once a day.      If you take the Celebrex once a day and still have abdominal pain please stop the medication and contact me    I have refilled the Voltaren which is the pain gel you should use a minimum of 3 times a day      I have also started you on a new medication called Cymbalta or duloxetine for pain it is a capsule please take it daily

## 2023-07-21 NOTE — PROGRESS NOTES
"  Assessment & Plan     Benign essential hypertension  Blood pressures well controlled today she brought her lisinopril and hydrochlorothiazide in for review but did not bring amlodipine she says she has medication at home we will continue this regimen.    Morbid obesity (H)    She states that its been hard for her to lose weight because she has not been able to walk but her family states she is very active she says she is trying to monitor what she eats.    Bilateral knee swelling    Leg swelling has improved however she states that taking the 2 Celebrex capsules of caused her to have stomach upset and decreased her appetite.  On reducing the Celebrex to 1 capsule a day and adding duloxetine to her regimen for pain control she has only been using Voltaren once a day and can use it up to 3 times a day  - celecoxib (CELEBREX) 200 MG capsule; Take 1 capsule (200 mg) by mouth daily  - diclofenac (VOLTAREN) 1 % topical gel; Apply 4 g topically 4 times daily  - DULoxetine (CYMBALTA) 30 MG capsule; Take 1 capsule (30 mg) by mouth daily    Primary osteoarthritis of both knees  Side effects of medication discussed with the  - DULoxetine (CYMBALTA) 30 MG capsule; Take 1 capsule (30 mg) by mouth daily    Hyperlipidemia LDL goal <130  Atorvastatin cholesterol panel discussed with the patient no changes made      Acute gastritis  If her symptoms continue on 1 capsule of Celebrex I will prescribe a PPI for her                BMI:   Estimated body mass index is 33.23 kg/m  as calculated from the following:    Height as of this encounter: 1.511 m (4' 11.5\").    Weight as of this encounter: 75.9 kg (167 lb 5 oz).           Jose Geiger MD  [facility]    Saqib Toussaint is a 63 year old, presenting for the following health issues:  Follow Up (HTN ) and Medication Problem (Questions )        7/21/2023    12:30 PM   Additional Questions   Roomed by Terri floyd   Accompanied by Son : Ese Deng and daughter in law : " "PAw     History of Present Illness       Reason for visit:  Follow up    She eats 2-3 servings of fruits and vegetables daily.She consumes 1 sweetened beverage(s) daily.She exercises with enough effort to increase her heart rate 10 to 19 minutes per day.  She exercises with enough effort to increase her heart rate 4 days per week.   She is taking medications regularly.               Review of Systems   Constitutional, HEENT, cardiovascular, pulmonary, gi and gu systems are negative, except as otherwise noted.      Objective      /74 (BP Location: Left arm, Patient Position: Sitting, Cuff Size: Adult Large)   Pulse 117   Temp 99.3  F (37.4  C) (Oral)   Resp 16   Ht 1.511 m (4' 11.5\")   Wt 75.9 kg (167 lb 5 oz)   SpO2 98%   BMI 33.23 kg/m      Physical Exam   GENERAL: healthy, alert and no distress  EYES: Eyes grossly normal to inspection, PERRL and conjunctivae and sclerae normal  RESP: lungs clear to auscultation - no rales, rhonchi or wheezes  CV: regular rate and rhythm, normal S1 S2, no S3 or S4, no murmur, click or rub, no peripheral edema and peripheral pulses strong  ABDOMEN: soft, nontender, no hepatosplenomegaly, no masses and bowel sounds normal  MS: Lachman's test negative bilaterally, Alphonso's test bilaterally negative.  Tenderness elicited with extension of her left knee beyond 40 degrees but no tenderness on extension she has no tenderness or discomfort while extending and flexing her right knee.  NEURO: Normal strength and tone, mentation intact and speech normal  PSYCH: mentation appears normal, affect normal/bright                    "

## 2023-09-21 ENCOUNTER — OFFICE VISIT (OUTPATIENT)
Dept: FAMILY MEDICINE | Facility: CLINIC | Age: 63
End: 2023-09-21
Payer: COMMERCIAL

## 2023-09-21 VITALS
DIASTOLIC BLOOD PRESSURE: 74 MMHG | SYSTOLIC BLOOD PRESSURE: 110 MMHG | RESPIRATION RATE: 18 BRPM | HEART RATE: 84 BPM | TEMPERATURE: 98 F | WEIGHT: 162.7 LBS | BODY MASS INDEX: 31.94 KG/M2 | HEIGHT: 60 IN

## 2023-09-21 DIAGNOSIS — M25.461 BILATERAL KNEE SWELLING: ICD-10-CM

## 2023-09-21 DIAGNOSIS — E78.5 HYPERLIPIDEMIA LDL GOAL <130: ICD-10-CM

## 2023-09-21 DIAGNOSIS — I10 BENIGN ESSENTIAL HYPERTENSION: Primary | ICD-10-CM

## 2023-09-21 DIAGNOSIS — Z23 ENCOUNTER FOR IMMUNIZATION: ICD-10-CM

## 2023-09-21 DIAGNOSIS — M25.462 BILATERAL KNEE SWELLING: ICD-10-CM

## 2023-09-21 DIAGNOSIS — E66.01 MORBID OBESITY (H): ICD-10-CM

## 2023-09-21 DIAGNOSIS — M17.0 PRIMARY OSTEOARTHRITIS OF BOTH KNEES: ICD-10-CM

## 2023-09-21 PROCEDURE — 90682 RIV4 VACC RECOMBINANT DNA IM: CPT | Performed by: FAMILY MEDICINE

## 2023-09-21 PROCEDURE — 99214 OFFICE O/P EST MOD 30 MIN: CPT | Mod: 25 | Performed by: FAMILY MEDICINE

## 2023-09-21 PROCEDURE — 90471 IMMUNIZATION ADMIN: CPT | Performed by: FAMILY MEDICINE

## 2023-09-21 RX ORDER — DULOXETIN HYDROCHLORIDE 30 MG/1
30 CAPSULE, DELAYED RELEASE ORAL 2 TIMES DAILY
Qty: 60 CAPSULE | Refills: 4 | Status: SHIPPED | OUTPATIENT
Start: 2023-09-21 | End: 2024-10-03

## 2023-09-21 RX ORDER — HYDROCHLOROTHIAZIDE 25 MG/1
25 TABLET ORAL DAILY
Qty: 30 TABLET | Refills: 11 | Status: SHIPPED | OUTPATIENT
Start: 2023-09-21 | End: 2024-10-03

## 2023-09-21 RX ORDER — LISINOPRIL 40 MG/1
40 TABLET ORAL DAILY
Qty: 30 TABLET | Refills: 11 | Status: SHIPPED | OUTPATIENT
Start: 2023-09-21 | End: 2024-01-18

## 2023-09-21 NOTE — PROGRESS NOTES
"  Assessment & Plan     Benign essential hypertension  Blood pressure well controlled she did not bring amlodipine because she is not using this medication if taken off her list we will continue with hydrochlorothiazide and lisinopril no side effects have been noted.  - hydrochlorothiazide (HYDRODIURIL) 25 MG tablet; Take 1 tablet (25 mg) by mouth daily  - lisinopril (ZESTRIL) 40 MG tablet; Take 1 tablet (40 mg) by mouth daily    Primary osteoarthritis of both knees    She was unable to tolerate Celebrex and suppressed her appetite have discontinued the medication duloxetine trial increased to 60 mg since family notes that she still has difficulty with abdominal pain we will discontinue this medication she can continue the Voltaren she gets 5 hours of PCA times per day family is wondering if they can have her we evaluated to see if she would be qualified for hours at night of care  - Primary Care - Care Coordination Referral; Future    Morbid obesity (H)  Weight has not changed because she is unable to walk regularly  - Primary Care - Care Coordination Referral; Future    Hyperlipidemia LDL goal <130      Encounter for immunization    Agrees for             BMI:   Estimated body mass index is 32.31 kg/m  as calculated from the following:    Height as of this encounter: 1.511 m (4' 11.5\").    Weight as of this encounter: 73.8 kg (162 lb 11.2 oz).           MD MUMTAZ Cronin [facility] VALERIANO Toussaint is a 63 year old, presenting for the following health issues:  Hypertension      9/21/2023     2:20 PM   Additional Questions   Roomed by Nikki PANDYA   Accompanied by daughter in law Paw       History of Present Illness       Reason for visit:  Follow up    She eats 2-3 servings of fruits and vegetables daily.She consumes 3 sweetened beverage(s) daily.She exercises with enough effort to increase her heart rate 10 to 19 minutes per day.  She exercises with enough effort to increase her heart rate 5 " "days per week.   She is taking medications regularly.         63-year-old female here with family members to discuss her hypertension and knee pain she states that she brought her medications in for review today.  She states that when she has been taking her medications for her knee pain she has been noticing that she is having abdominal pain family says she has not been eating regularly because the medications appear to give her GI distress.  They are also wondering if they can get a handicap sticker and wondering if she can qualify for more PCRs especially at night when it is difficult for her to move around in the home and she needs assistance.        Review of Systems   Constitutional, HEENT, cardiovascular, pulmonary, gi and gu systems are negative, except as otherwise noted.      Objective    /74   Pulse 84   Temp 98  F (36.7  C) (Oral)   Resp 18   Ht 1.511 m (4' 11.5\")   Wt 73.8 kg (162 lb 11.2 oz)   BMI 32.31 kg/m    Body mass index is 32.31 kg/m .  Physical Exam   GENERAL: healthy, alert and no distress  EYES: Eyes grossly normal to inspection, PERRL and conjunctivae and sclerae normal  NECK: no adenopathy, no asymmetry, masses, or scars and thyroid normal to palpation  RESP: lungs clear to auscultation - no rales, rhonchi or wheezes  CV: regular rate and rhythm, normal S1 S2, no S3 or S4, no murmur, click or rub, no peripheral edema and peripheral pulses strong  MS: Tenderness noted over the medial aspect of the left patella flexion of the left knee beyond 30 degrees causes discomfort extension on 40 degrees causes discomfort of the left lateral patella.  Drawer tests are negative.  She has tenderness over the medial aspect of the right quadriceps and tenderness with flexion beyond 30 degrees of the right knee drawer test and Alphonso's tests are negative on the right side  SKIN: no suspicious lesions or rashes  NEURO: Normal strength and tone, mentation intact and speech normal  PSYCH: " mentation appears normal, affect normal/bright

## 2023-09-22 ENCOUNTER — PATIENT OUTREACH (OUTPATIENT)
Dept: CARE COORDINATION | Facility: CLINIC | Age: 63
End: 2023-09-22
Payer: COMMERCIAL

## 2023-09-22 NOTE — PROGRESS NOTES
Clinic Care Coordination Contact  [facility]/Voicemail  Jossy  ID [id number]     Clinical Data: Care Coordinator Outreach  Outreach attempted x 1.  Unable to leave a message on patient's voicemail with call back information and request return call. No voicemail.    Chart Review: Referral from PCP  Reason for Referral: Complex Medical Concerns/Education     Complex Medical Concerns:  Patient has no PCA was provided at night wants to be reassessed to see if she can qualify      Plan: Care Coordinator will try to reach patient again in 1-2 business days.    Adia Najera  Clinic Care Coordination  [facility]    Phone: [phone]

## 2023-09-25 ENCOUNTER — PATIENT OUTREACH (OUTPATIENT)
Dept: CARE COORDINATION | Facility: CLINIC | Age: 63
End: 2023-09-25
Payer: COMMERCIAL

## 2023-09-25 NOTE — PROGRESS NOTES
Clinic Care Coordination Contact  Community Health Worker Initial Outreach  Spoke with patient, Destin Boothe (Niece), and   EhEse (Son/PCA) through Jossy  ([employer]). CHW asked for verbal permission to speak with PCA and patient agreed.      CHW Initial Information Gathering:  Referral Source: PCP  Current living arrangement:: I live in a private home with family  Type of residence:: Private home - stairs  Community Resources: PCA  Supplies Currently Used at Home: None  Equipment Currently Used at Home: walker, standard  Informal Support system:: Children, Family  No PCP office visit in Past Year: No  Transportation means:: Medical transport  CHW Additional Questions  If ED/Hospital discharge, follow-up appointment scheduled as recommended?: N/A  Medication changes made following ED/Hospital discharge?: N/A  MyChart active?: No  Patient agreeable to assistance with activating MyChart?: No    Patient accepts CC: Yes. Patient scheduled for assessment with Rea Merida on 9/28/23 at 3PM. Patient noted desire to discuss CCC and support with requesting PCA reassessment.     Chart Review: Referral from PCP  Reason for Referral: Complex Medical Concerns/Education     Complex Medical Concerns:  Patient has no PCA was provided at night wants to be reassessed to see if she can qualify      CHW introduced self and role with CCC. CHW requested to speak with Ese as he is the son/PCA who went to appointment with patient on 9/21. CHW inquired about name of agency. Ese shares that the [facility] is [facility] [phone].  CHW called number, male who answered confirmed name of agency is [employer]. [phone], fax [phone].    Adia Najera  Clinic Care Coordination  [facility]    Phone: [phone]

## 2023-09-27 ENCOUNTER — TELEPHONE (OUTPATIENT)
Dept: FAMILY MEDICINE | Facility: CLINIC | Age: 63
End: 2023-09-27
Payer: COMMERCIAL

## 2023-09-27 NOTE — TELEPHONE ENCOUNTER
Central Prior Authorization Team  Phone: [phone]    PA Initiation    Medication: DULOXETINE HCL 30 MG PO CPEP  Insurance Company: [employer] - Phone [phone] Fax [phone]  Pharmacy Filling the Rx: [facility] - [place], [place] - [address] [place] [address]  Filling Pharmacy Phone: [phone]  Filling Pharmacy Fax:    Start Date: 9/27/2023

## 2023-09-28 ENCOUNTER — PATIENT OUTREACH (OUTPATIENT)
Dept: NURSING | Facility: CLINIC | Age: 63
End: 2023-09-28
Payer: COMMERCIAL

## 2023-09-28 ASSESSMENT — ACTIVITIES OF DAILY LIVING (ADL): DEPENDENT_IADLS:: CLEANING;COOKING;LAUNDRY;SHOPPING;MEAL PREPARATION;MEDICATION MANAGEMENT;TRANSPORTATION

## 2023-09-28 NOTE — LETTER
[facility]  Patient Centered Plan of Care  About Me:        Patient Name:  Breana Ruby    YOB: 1960  Age:         63 year old   Norma MRN:    [id number] Telephone Information:  Home Phone [phone]   Mobile [phone]   Mobile [phone]   Mobile [phone]       Address:  [address] [place] [address] Email address:  No e-mail address on record      Emergency Contact(s)    Name Relationship Lgl Grd Work Phone Home Phone Mobile Phone   1. JOSE L RUBY Niece   [phone]            Primary language:  Jossy     needed? Yes   [employer] [employer]:  [phone] op. 1  Other communication barriers:Language barrier    Preferred Method of Communication:     Current living arrangement: I live in a private home with family    Mobility Status/ Medical Equipment: Independent w/Device        Health Maintenance  Health Maintenance Reviewed: Due/Overdue   Health Maintenance Due   Topic Date Due    YEARLY PREVENTIVE VISIT  Never done    ADVANCE CARE PLANNING  Never done    ZOSTER IMMUNIZATION (1 of 2) Never done    COVID-19 Vaccine (4 - Moderna series) 06/28/2022          My Access Plan  Medical Emergency 911   Primary Clinic Line [facility] [phone]   24 Hour Appointment Line [phone] or  [phone]) (toll-free)   24 Hour Nurse Line [phone] (toll-free)   Preferred Urgent Care [facility], [phone]     [facility]  [phone]     Preferred Pharmacy [facility] - [address]     Behavioral Health Crisis Line The National Suicide Prevention Lifeline at 1-941.669.5507 or Text/Call 818             My Care Team Members  Patient Care Team         Relationship Specialty Notifications Start End    Jose Geiger MD PCP - General Family Medicine  5/30/23     Phone: [phone] Fax: [phone]         [facility] [address] [place]  [address]    Jose Geiger MD Assigned PCP   6/16/21     Phone: [phone] Fax: [phone]         [facility] [address] [place] [address]    Christiano Hall MD Assigned Musculoskeletal Provider   8/20/22     Phone: [phone] Fax: [phone]         [address] [facility] [address]              [place] [address]    Adia Najera Community Health Worker Primary Care - CC Admissions 9/22/23     Rea Garland [facility] Lead Care Coordinator  Admissions 9/25/23     Ese Deng (Son) Personal Care Attendant PCA   9/25/23     5 hours of PCA per day  [facility]. [phone], fax [phone]    Phone: [phone] Fax: [phone]                  My Care Plans  Self Management and Treatment Plan  Care Plan  Care Plan: I would like more PCA hours       Problem: HP GENERAL PROBLEM       Priority: High      Goal: General Goal - please update text       Start Date: 9/28/2023    This Visit's Progress: 10%    Priority: High    Note:     Barriers: language  Strengths: supportive family  Patient expressed understanding of goal: yes  Action steps to achieve this goal:  1. I will ask [facility] for assistance obtaining more PCA hours  2. I will follow through with [facility] phone calls  3. I will follow up with CCC at next outreach.                                 Action Plans on File:                       Advance Care Plans/Directives Type:   No data recorded    My Medical and Care Information  Problem List   Patient Active Problem List   Diagnosis    Benign essential hypertension    Hyperlipidemia LDL goal <130    Multiple joint pain    Morbid obesity (H)    Chronic pain of right knee    Cervical cancer screening      Current Medications and Allergies:  See printed Medication Report.    Care Coordination Start Date: 9/21/2023   Frequency of Care Coordination: monthly     Form Last Updated: 09/29/2023

## 2023-09-28 NOTE — TELEPHONE ENCOUNTER
Medication: DULoxetine (CYMBALTA) 30 MG capsule  Per call to [employer]/ [employer], pa was cancelled because pt has a primary insurance coverage ([facility] is listed as secondary).  The pharmacy needs to bill this medication to the primary insurance. Spoke with Sim at [facility] pharmacy, informed him to reach out to pt to obtain the primary insurance   Info or if pt does not have a primary insurance plan, pt needs to contact [employer]/ [employer] to update her account.    Pharmacy Notified:  Yes  Patient Notified:  No    Please close encounter when finished.    Thank you,  Central Prior Authorization Team  [phone]

## 2023-09-29 NOTE — PROGRESS NOTES
Clinic Care Coordination Contact  Clinic Care Coordination Contact  OUTREACH    Referral Information:  Referral Source: PCP         Chief Complaint   Patient presents with    Clinic Care Coordination - Initial        Universal Utilization: appropriate  Clinic Utilization  Difficulty keeping appointments:: No  Compliance Concerns: No  No-Show Concerns: No  No PCP office visit in Past Year: No  Utilization      Hospital Admissions  0             ED Visits  0             No Show Count (past year)  2                    Current as of: 9/28/2023 10:58 PM                Clinical Concerns:  Current Medical Concerns:  none    Current Behavioral Concerns: none    Education Provided to patient: CCC support, education and resources   Pain  Pain (GOAL):: No  Health Maintenance Reviewed: Due/Overdue   Health Maintenance Due   Topic Date Due    YEARLY PREVENTIVE VISIT  Never done    ADVANCE CARE PLANNING  Never done    ZOSTER IMMUNIZATION (1 of 2) Never done    COVID-19 Vaccine (4 - Moderna series) 06/28/2022          Medication Management:  Medication review status: Medications reviewed and no changes reported per patient.             Functional Status:  Dependent ADLs:: Ambulation-walker  Dependent IADLs:: Cleaning, Cooking, Laundry, Shopping, Meal Preparation, Medication Management, Transportation  Bed or wheelchair confined:: No  Mobility Status: Independent w/Device  Fallen 2 or more times in the past year?: No  Any fall with injury in the past year?: No    Living Situation:  Current living arrangement:: I live in a private home with family  Type of residence:: Private home - stairs    Lifestyle & Psychosocial Needs:    Social Determinants of Health     Food Insecurity: Low Risk  (9/21/2023)    Food Insecurity     Within the past 12 months, did you worry that your food would run out before you got money to buy more?: No     Within the past 12 months, did the food you bought just not last and you didn t have money to get  more?: No   Depression: Not at risk (1/12/2023)    PHQ-2     PHQ-2 Score: 0   Housing Stability: Low Risk  (9/21/2023)    Housing Stability     Do you have housing? : Yes     Are you worried about losing your housing?: No   Tobacco Use: Low Risk  (9/21/2023)    Patient History     Smoking Tobacco Use: Never     Smokeless Tobacco Use: Never     Passive Exposure: Never   Financial Resource Strain: Unknown (9/21/2023)    Financial Resource Strain     Within the past 12 months, have you or your family members you live with been unable to get utilities (heat, electricity) when it was really needed?: Patient refused   Alcohol Use: Not on file   Transportation Needs: Unknown (9/21/2023)    Transportation Needs     Within the past 12 months, has lack of transportation kept you from medical appointments, getting your medicines, non-medical meetings or appointments, work, or from getting things that you need?: Patient refused   Physical Activity: Not on file   Interpersonal Safety: Not on file   Stress: Not on file   Social Connections: Not on file     Diet:: Regular  Inadequate nutrition (GOAL):: No  Tube Feeding: No  Inadequate activity/exercise (GOAL):: No  Significant changes in sleep pattern (GOAL): No  Transportation means:: Medical transport, Regular car     Restorationism or spiritual beliefs that impact treatment:: No  Mental health DX:: No  Mental health management concern (GOAL):: No  Chemical Dependency Status: No Current Concerns  Informal Support system:: Children, Family        Resources and Interventions:  Current Resources:      Community Resources: PCA  Supplies Currently Used at Home: None  Equipment Currently Used at Home: walker, standard  Employment Status: disabled         Advance Care Plan/Directive  Advanced Care Plans/Directives on file:: No  Advanced Care Plan/Directive Status: Declined Further Information    Referrals Placed: None         Care Plan:  Care Plan: I would like more PCA hours       Problem:  HP GENERAL PROBLEM       Priority: High      Goal: General Goal - please update text       Start Date: 9/28/2023    This Visit's Progress: 10%    Priority: High    Note:     Barriers: language  Strengths: supportive family  Patient expressed understanding of goal: yes  Action steps to achieve this goal:  1. I will ask CCC for assistance obtaining more PCA hours  2. I will follow through with [facility] phone calls  3. I will follow up with CCC at next outreach.                                Patient/Caregiver understanding: yes    Outreach Frequency: monthly      Plan: CCSW, Jossy  and pt's son completed assessment. Pt has 5.5 hours of PCA services daily. Family stated that pt is alone at night and pt would benefit from more hours[facility] for more hours. If PCP is agreeable [facility]. Family denied further needs at this time.        FLORES Forde, [facility]  Social Work Care Coordinator

## 2023-10-25 ENCOUNTER — PATIENT OUTREACH (OUTPATIENT)
Dept: CARE COORDINATION | Facility: CLINIC | Age: 63
End: 2023-10-25
Payer: COMMERCIAL

## 2023-10-25 ASSESSMENT — ACTIVITIES OF DAILY LIVING (ADL): DEPENDENT_IADLS:: CLEANING;COOKING;LAUNDRY;SHOPPING;MEAL PREPARATION;MEDICATION MANAGEMENT;TRANSPORTATION

## 2023-10-25 NOTE — PROGRESS NOTES
Clinic Care Coordination Contact  Community Health Worker Follow Up  Spoke with Tl Conner (Niece) through Jossy  ID (Lay)     Care Gaps:     Health Maintenance Due   Topic Date Due    YEARLY PREVENTIVE VISIT  Never done    ADVANCE CARE PLANNING  Never done    ZOSTER IMMUNIZATION (1 of 2) Never done    RSV VACCINE 60+ (1 - 1-dose 60+ series) Never done    COVID-19 Vaccine (4 - 2023-24 season) 09/01/2023     Postponed to next outreach.     Care Plan:   Care Plan: I would like more PCA hours       Problem: HP GENERAL PROBLEM       Priority: High      Goal: General Goal - please update text       Start Date: 9/28/2023    This Visit's Progress: 10% Recent Progress: 10%    Priority: High    Note:     Barriers: language  Strengths: supportive family  Patient expressed understanding of goal: yes  Action steps to achieve this goal:  1. I will ask CCC for assistance obtaining more PCA hours  2. I will follow through with [facility] phone calls  3. I will follow up with CCC at next outreach.                            Intervention and Education during outreach:   Per chart review, CCSW routed message to PCP to clarify letter of support for early reassessment of PCA hours.     CHW reviewed above with Tl and will check on status. CHW explained early PCA reassessment process and will forward letter to patients home care agency once clarified with PCP. Tl expressed understanding and will assist patient as needed.     CHW Plan:  CHW to follow up in 1 month. Routing to lead clinician and PCP for review.    Adia Najera  [facility] Care Coordination  [facility]    Phone: [phone]

## 2023-11-15 ENCOUNTER — PATIENT OUTREACH (OUTPATIENT)
Dept: CARE COORDINATION | Facility: CLINIC | Age: 63
End: 2023-11-15
Payer: COMMERCIAL

## 2023-11-15 NOTE — PROGRESS NOTES
Clinic Care Coordination Contact  Care Coordination Clinician Chart Review    Situation: Patient chart reviewed by Care Coordinator.       Background: Care Coordination Program started: 9/21/2023. Initial assessment completed and patient-centered care plan(s) were developed with participation from patient. Lead CC handed patient off to CHW for continued outreaches.       Assessment: Per chart review, patient outreach completed by CC CHW on 10/25/23.  Patient is actively working to accomplish goal(s). Patient's goal(s) appropriate and relevant at this time. Patient is not due for updated Plan of Care.  Assessments will be completed annually or as needed/with change of patient status.      Care Plan: I would like more PCA hours       Problem: HP GENERAL PROBLEM       Priority: High      Goal: PCA hours       Start Date: 9/28/2023    Recent Progress: 10%    Priority: High    Note:     Barriers: language  Strengths: supportive family  Patient expressed understanding of goal: yes  Action steps to achieve this goal:  1. I will ask CCC for assistance obtaining more PCA hours  2. I will follow through with [facility] phone calls  3. I will follow up with CCC at next outreach.                                   Plan/Recommendations: The patient will continue working with Care Coordination to achieve goal(s) as above. CHW will continue outreaches at minimum every 30 days and will involve Lead CC as needed or if patient is ready to move to Maintenance. Lead CC will continue to monitor CHW outreaches and patient's progress to goal(s) every 6 weeks.     Plan of Care updated and sent to patient: FLORES Salas, [facility]  Social Work Care Coordinator

## 2023-11-28 ENCOUNTER — PATIENT OUTREACH (OUTPATIENT)
Dept: CARE COORDINATION | Facility: CLINIC | Age: 63
End: 2023-11-28
Payer: COMMERCIAL

## 2023-11-28 ASSESSMENT — ACTIVITIES OF DAILY LIVING (ADL): DEPENDENT_IADLS:: CLEANING;COOKING;LAUNDRY;SHOPPING;MEAL PREPARATION;MEDICATION MANAGEMENT;TRANSPORTATION

## 2023-11-28 NOTE — PROGRESS NOTES
Clinic Care Coordination Contact  Community Health Worker Follow Up  Spoke with Tl Conner (Niece) through Jossy  ID (Brian)    Care Gaps:     Health Maintenance Due   Topic Date Due    YEARLY PREVENTIVE VISIT  Never done    ADVANCE CARE PLANNING  Never done    ZOSTER IMMUNIZATION (1 of 2) Never done    RSV VACCINE (Pregnancy & 60+) (1 - 1-dose 60+ series) Never done    COVID-19 Vaccine (4 - 2023-24 season) 09/01/2023     Scheduled 12/27/23 at 2:10PM with Dr. Geiger.       Care Plan:   Care Plan: I would like more PCA hours       Problem: HP GENERAL PROBLEM       Priority: High      Goal: PCA hours       Start Date: 9/28/2023    This Visit's Progress: 10% Recent Progress: 10%    Priority: High    Note:     Barriers: language  Strengths: supportive family  Patient expressed understanding of goal: yes  Action steps to achieve this goal:  1. I will ask CCC for assistance obtaining more PCA hours  2. I will follow through with [facility] phone calls  3. I will follow up with CCC at next outreach.                            Intervention and Education during outreach:   Tl shares that patient is still interested in early reassessment of PCA hours. They are wondering if she would qualify for more hours for care during the night. Tl and Mulligan (Son/PCA) plan to attend next visit on 12/27 and will discuss further with PCP.  CCC team will follow up after visit to assist as needed.     CHW Plan:  CHW to follow up in 1 month. Routing to lead clinician CCSW for review.     Adia Najera  [facility] Care Coordination  [facility]    Phone: [phone]

## 2023-11-28 NOTE — Clinical Note
LORENZO De La Garza, patient and caregivers will discuss letter for early PCA reassessment at patients next visit. Thank you.   Addis

## 2023-12-04 ENCOUNTER — TELEPHONE (OUTPATIENT)
Dept: FAMILY MEDICINE | Facility: CLINIC | Age: 63
End: 2023-12-04
Payer: COMMERCIAL

## 2023-12-04 NOTE — TELEPHONE ENCOUNTER
Medication Question or Refill    Contacts         Type Contact Phone/Fax    12/04/2023 11:56 AM CST Phone (Incoming) Breana Conner (Self) [phone] (M)            What medication are you calling about (include dose and sig)?: hydrochlorothiazide (HYDRODIURIL) 25 MG tablet, lisinopril (ZESTRIL) 40 MG tablet     Preferred Pharmacy:   [address] [address]  Phone: [phone] Fax: [phone]      Controlled Substance Agreement on file:   CSA -- Patient Level:    CSA: None found at the patient level.       Who prescribed the medication?: Dr. Geiger    Do you need a refill? Yes    When did you use the medication last? Not use yet.    Patient offered an appointment? No    Do you have any questions or concerns?  Yes: Patient went to the [facility] pharmacy, patient was told there is no refill and prescription has been cancel or discontinued. There is 11 refills Dr. Geiger put in on 09/21/23. Please help call pharmacy to resolve this issue.      Okay to leave a detailed message?: Yes at Cell number on file:    Telephone Information:   Mobile [phone]   Mobile [phone]   Mobile [phone]

## 2023-12-05 DIAGNOSIS — I10 BENIGN ESSENTIAL HYPERTENSION: ICD-10-CM

## 2023-12-05 RX ORDER — LISINOPRIL 40 MG/1
40 TABLET ORAL DAILY
Qty: 30 TABLET | Refills: 11 | OUTPATIENT
Start: 2023-12-05

## 2023-12-05 NOTE — TELEPHONE ENCOUNTER
"Called [facility] and spoke to [facility] pharmacist staff regarding medication below. Per Soha, it appears they do not see anymore refills for \"Hydrochlorothiazide.\" Writer explained that there should be abou 10 remaining refills left, per chart review.     Soha, tried looking and running script again, and prescription went through. Pharmacy will notify pt once it is available for pick-up.    Writer called patient with the help of a \"Jossy\"  regarding message above. No answer, left non-detailed voicemail, with clinic call back number.     If pt returns call back, please let pt know that [facility] is currently working Hydrochlorothiazide refill and will be notified once it is available for pick-up. Thanks!    Closing encounter.    HUNG CostelloN, RN   [facility]        "

## 2023-12-06 DIAGNOSIS — Z76.0 ENCOUNTER FOR MEDICATION REFILL: ICD-10-CM

## 2023-12-06 DIAGNOSIS — E78.5 HYPERLIPIDEMIA LDL GOAL <130: ICD-10-CM

## 2023-12-07 RX ORDER — ATORVASTATIN CALCIUM 40 MG/1
40 TABLET, FILM COATED ORAL DAILY
Qty: 90 TABLET | Refills: 3 | Status: SHIPPED | OUTPATIENT
Start: 2023-12-07

## 2023-12-07 RX ORDER — ATORVASTATIN CALCIUM 40 MG/1
40 TABLET, FILM COATED ORAL DAILY
Qty: 90 TABLET | Refills: 3 | OUTPATIENT
Start: 2023-12-07

## 2023-12-07 RX ORDER — ATORVASTATIN CALCIUM 20 MG/1
20 TABLET, FILM COATED ORAL DAILY
Qty: 30 TABLET | Refills: 11 | Status: SHIPPED | OUTPATIENT
Start: 2023-12-07 | End: 2024-10-03

## 2023-12-07 RX ORDER — ATORVASTATIN CALCIUM 20 MG/1
20 TABLET, FILM COATED ORAL DAILY
Qty: 30 TABLET | Refills: 11 | OUTPATIENT
Start: 2023-12-07

## 2023-12-29 ENCOUNTER — PATIENT OUTREACH (OUTPATIENT)
Dept: CARE COORDINATION | Facility: CLINIC | Age: 63
End: 2023-12-29
Payer: COMMERCIAL

## 2023-12-29 NOTE — LETTER
[facility]  Patient Centered Plan of Care  About Me:        Patient Name:  Breana Conner    YOB: 1960  Age:         63 year old   Norma MRN:    [id number] Telephone Information:  Home Phone [phone]   Mobile [phone]   Mobile [phone]   Mobile [phone]       Address:  [address] [place] [address] Email address:  No e-mail address on record      Emergency Contact(s)    Name Relationship Lgl Grd Work Phone Home Phone Mobile Phone   1. FLORI,JOSE L Niece   [phone]            Primary language:  Jossy     needed? Yes   [employer]:  [phone] op. 1  Other communication barriers:Language barrier    Preferred Method of Communication:     Current living arrangement: I live in a private home with family    Mobility Status/ Medical Equipment: Independent w/Device        Health Maintenance  Health Maintenance Reviewed: Due/Overdue   Health Maintenance Due   Topic Date Due    YEARLY PREVENTIVE VISIT  Never done    ADVANCE CARE PLANNING  Never done    ZOSTER IMMUNIZATION (1 of 2) Never done    RSV VACCINE (Pregnancy & 60+) (1 - 1-dose 60+ series) Never done    COVID-19 Vaccine (4 - 2023-24 season) 09/01/2023           My Access Plan  Medical Emergency 911   Primary Clinic Line [facility] [phone]   24 Hour Appointment Line [phone] or  [phone]) (toll-free)   24 Hour Nurse Line [phone] (toll-free)   Preferred Urgent Care [facility] [phone]     Preferred Hospital [facility]  [phone]     Preferred Pharmacy [facility] - [address]     Behavioral Health Crisis Line The National Suicide Prevention Lifeline at 1-851.543.3510 or Text/Call 338           My Care Team Members  Patient Care Team         Relationship Specialty Notifications Start End    Jose Geiger MD PCP - General Family Medicine  5/30/23     Phone:  [phone] Fax: [phone]         [address] [place] [address]    Jose Geiger MD Assigned PCP   6/16/21     Phone: [phone] Fax: [phone]         [address] [place] [address]    Christiano Hall MD Assigned Musculoskeletal Provider   8/20/22     Phone: [phone] Fax: [phone]         [address] [facility] [place] [address]    Adia Najera, FREEDOM Community Health Worker Primary Care - CC Admissions 9/22/23     Rea Garland [facility] Lead Care Coordinator  Admissions 9/25/23     Ese Deng (Son) Personal Care Attendant PCA   9/25/23     5 hours of PCA per day  [facility]. [phone], fax [phone]    Phone: [phone] Fax: [phone]                    My Care Plans  Self Management and Treatment Plan    Care Plan  Care Plan: I would like more PCA hours       Problem: HP GENERAL PROBLEM       Priority: High      Goal: PCA hours       Start Date: 9/28/2023    This Visit's Progress: 10% Recent Progress: 10%    Priority: High    Note:     Barriers: language  Strengths: supportive family  Patient expressed understanding of goal: yes  Action steps to achieve this goal:  1. I will ask [facility] for assistance obtaining more PCA hours  2. I will follow through with [facility] phone calls  3. I will follow up with CCC at next outreach.                                Action Plans on File:                       Advance Care Plans/Directives:   Advanced Care Plan/Directives on file:   No    Discussed with patient/caregiver(s): No data recorded           My Medical and Care Information  Problem List   Patient Active Problem List   Diagnosis    Benign essential hypertension    Hyperlipidemia LDL goal <130    Multiple joint pain    Morbid obesity (H)    Chronic pain of right knee    Cervical cancer screening      Current Medications and Allergies:  See printed Medication Report.    Care Coordination Start Date: 9/21/2023   Frequency of Care Coordination: monthly,  more frequently as needed     Form Last Updated: 12/29/2023

## 2023-12-29 NOTE — PROGRESS NOTES
Clinic Care Coordination Contact  Care Coordination Clinician Chart Review    Situation: Patient chart reviewed by Care Coordinator.       Background: Care Coordination Program started: 9/21/2023. Initial assessment completed and patient-centered care plan(s) were developed with participation from patient. Lead CC handed patient off to CHW for continued outreaches.       Assessment: Per chart review, patient outreach completed by CC CHW on 11/28.  Patient is not actively working to accomplish goal(s). Patient's goal(s) appropriate and relevant at this time. Patient is due for updated Plan of Care.  Assessments will be completed annually or as needed/with change of patient status.      Care Plan: I would like more PCA hours       Problem: HP GENERAL PROBLEM       Priority: High      Goal: PCA hours       Start Date: 9/28/2023    This Visit's Progress: 10% Recent Progress: 10%    Priority: High    Note:     Barriers: language  Strengths: supportive family  Patient expressed understanding of goal: yes  Action steps to achieve this goal:  1. I will ask CCC for assistance obtaining more PCA hours  2. I will follow through with [facility] phone calls  3. I will follow up with CCC at next outreach.                                   Plan/Recommendations: The patient will continue working with Care Coordination to achieve goal(s) as above. CHW will continue outreaches at minimum every 30 days and will involve Lead CC as needed or if patient is ready to move to Maintenance. Lead CC will continue to monitor CHW outreaches and patient's progress to goal(s) every 6 weeks.     Plan of Care updated and sent to patient: Yes, via mail    CCC will continue to support pt reaching goals.       Rea Garland, MSW, [facility]  Social Work Care Coordinator

## 2024-01-03 ENCOUNTER — PATIENT OUTREACH (OUTPATIENT)
Dept: CARE COORDINATION | Facility: CLINIC | Age: 64
End: 2024-01-03
Payer: COMMERCIAL

## 2024-01-03 NOTE — PROGRESS NOTES
Clinic Care Coordination Contact  [facility]/Voicemail       Clinical Data: CHW Outreach    Outreach attempted x 1.  Left message on patient's voicemail with call back information and requested return call.    Plan: CHW will make another attempt to reach the patient via phone or MyChart.    CHW outreach in the next 2 weeks.      FREEDOM Mack  Clinic Care Coordination   [facility]   Phone: [phone]  [email or web address]

## 2024-01-15 DIAGNOSIS — I10 BENIGN ESSENTIAL HYPERTENSION: ICD-10-CM

## 2024-01-15 RX ORDER — LISINOPRIL 40 MG/1
40 TABLET ORAL DAILY
Qty: 30 TABLET | Refills: 11 | OUTPATIENT
Start: 2024-01-15

## 2024-01-17 DIAGNOSIS — I10 BENIGN ESSENTIAL HYPERTENSION: ICD-10-CM

## 2024-01-17 RX ORDER — LISINOPRIL 40 MG/1
40 TABLET ORAL DAILY
Qty: 30 TABLET | Refills: 11 | OUTPATIENT
Start: 2024-01-17

## 2024-01-18 RX ORDER — LISINOPRIL 40 MG/1
40 TABLET ORAL DAILY
Qty: 30 TABLET | Refills: 11 | Status: SHIPPED | OUTPATIENT
Start: 2024-01-18

## 2024-01-18 NOTE — TELEPHONE ENCOUNTER
Patient was at the pharmacy today 1/18/24 and they told her they didn't have any refills on file for this medication please call the pharmacy at [phone]

## 2024-01-18 NOTE — TELEPHONE ENCOUNTER
Called [facility] pharmacy and spoke to Osmani, pharmacist who confirmed medication sent on 9/21/23 but also cancelled the same day by provider.  Chart reviewed, this cancellation may have cancelled unintentionally.    Assessment & Plan   Benign essential hypertension  Blood pressure well controlled she did not bring amlodipine because she is not using this medication if taken off her list we will continue with hydrochlorothiazide and lisinopril no side effects have been noted.  - hydrochlorothiazide (HYDRODIURIL) 25 MG tablet; Take 1 tablet (25 mg) by mouth daily  - lisinopril (ZESTRIL) 40 MG tablet; Take 1 tablet (40 mg) by mouth daily     Will route to provider for the refill.      HUNG AlN, RN  [facility]

## 2024-01-23 ENCOUNTER — PATIENT OUTREACH (OUTPATIENT)
Dept: CARE COORDINATION | Facility: CLINIC | Age: 64
End: 2024-01-23

## 2024-01-23 NOTE — PROGRESS NOTES
Clinic Care Coordination Contact  [facility]/Voicemail       Clinical Data: CHW Outreach    Outreach attempted x 2. Left message on patient's voicemail with call back information and requested return call.    Plan: CHW will send message to Lead CC ([facility]) to determine whether a disenrollment letter should be sent to Patient or if additional attempt should be made to Patient. No further outreach attempts will be made. Should they return my call, I will discuss clinic care coordination per standard work.    FREEDOM Mack  Clinic Care Coordination   [facility]   Phone: [phone]  [email or web address]

## 2024-02-09 ENCOUNTER — PATIENT OUTREACH (OUTPATIENT)
Dept: CARE COORDINATION | Facility: CLINIC | Age: 64
End: 2024-02-09
Payer: COMMERCIAL

## 2024-02-09 NOTE — PROGRESS NOTES
Clinic Care Coordination Contact    Assessment: CCC has attempted multiple outreaches and has been unable tor each pt.   Enrollment status: Closed     Plan: No further outreaches at this time.    If new needs arise a new Care Coordination referral may be placed.  FYI to PCP    FLORES Forde, [facility]  Social Work Care Coordinator

## 2024-02-27 ENCOUNTER — ANCILLARY PROCEDURE (OUTPATIENT)
Dept: GENERAL RADIOLOGY | Facility: CLINIC | Age: 64
End: 2024-02-27
Attending: FAMILY MEDICINE
Payer: COMMERCIAL

## 2024-02-27 ENCOUNTER — OFFICE VISIT (OUTPATIENT)
Dept: FAMILY MEDICINE | Facility: CLINIC | Age: 64
End: 2024-02-27
Payer: COMMERCIAL

## 2024-02-27 ENCOUNTER — PATIENT OUTREACH (OUTPATIENT)
Dept: CARE COORDINATION | Facility: CLINIC | Age: 64
End: 2024-02-27

## 2024-02-27 VITALS
SYSTOLIC BLOOD PRESSURE: 139 MMHG | RESPIRATION RATE: 20 BRPM | DIASTOLIC BLOOD PRESSURE: 93 MMHG | HEART RATE: 115 BPM | BODY MASS INDEX: 33.58 KG/M2 | OXYGEN SATURATION: 96 % | HEIGHT: 60 IN | WEIGHT: 171.06 LBS | TEMPERATURE: 98.2 F

## 2024-02-27 DIAGNOSIS — R00.0 TACHYCARDIA: ICD-10-CM

## 2024-02-27 DIAGNOSIS — I10 BENIGN ESSENTIAL HYPERTENSION: ICD-10-CM

## 2024-02-27 DIAGNOSIS — M25.461 BILATERAL KNEE SWELLING: ICD-10-CM

## 2024-02-27 DIAGNOSIS — Z00.00 ROUTINE HISTORY AND PHYSICAL EXAMINATION OF ADULT: ICD-10-CM

## 2024-02-27 DIAGNOSIS — E78.5 HYPERLIPIDEMIA LDL GOAL <130: ICD-10-CM

## 2024-02-27 DIAGNOSIS — M25.462 BILATERAL KNEE SWELLING: ICD-10-CM

## 2024-02-27 DIAGNOSIS — E66.01 MORBID OBESITY (H): Primary | ICD-10-CM

## 2024-02-27 DIAGNOSIS — M17.0 PRIMARY OSTEOARTHRITIS OF BOTH KNEES: ICD-10-CM

## 2024-02-27 LAB
ERYTHROCYTE [DISTWIDTH] IN BLOOD BY AUTOMATED COUNT: 14.9 % (ref 10–15)
HCT VFR BLD AUTO: 42.3 % (ref 35–47)
HGB BLD-MCNC: 13.8 G/DL (ref 11.7–15.7)
MCH RBC QN AUTO: 27.1 PG (ref 26.5–33)
MCHC RBC AUTO-ENTMCNC: 32.6 G/DL (ref 31.5–36.5)
MCV RBC AUTO: 83 FL (ref 78–100)
PLATELET # BLD AUTO: 413 10E3/UL (ref 150–450)
RBC # BLD AUTO: 5.09 10E6/UL (ref 3.8–5.2)
WBC # BLD AUTO: 15.2 10E3/UL (ref 4–11)

## 2024-02-27 PROCEDURE — 80048 BASIC METABOLIC PNL TOTAL CA: CPT | Performed by: FAMILY MEDICINE

## 2024-02-27 PROCEDURE — 85027 COMPLETE CBC AUTOMATED: CPT | Performed by: FAMILY MEDICINE

## 2024-02-27 PROCEDURE — 36415 COLL VENOUS BLD VENIPUNCTURE: CPT | Performed by: FAMILY MEDICINE

## 2024-02-27 PROCEDURE — 99396 PREV VISIT EST AGE 40-64: CPT | Performed by: FAMILY MEDICINE

## 2024-02-27 PROCEDURE — 99214 OFFICE O/P EST MOD 30 MIN: CPT | Mod: 25 | Performed by: FAMILY MEDICINE

## 2024-02-27 PROCEDURE — 84443 ASSAY THYROID STIM HORMONE: CPT | Performed by: FAMILY MEDICINE

## 2024-02-27 PROCEDURE — 73565 X-RAY EXAM OF KNEES: CPT | Mod: TC | Performed by: RADIOLOGY

## 2024-02-27 RX ORDER — METOPROLOL SUCCINATE 25 MG/1
25 TABLET, EXTENDED RELEASE ORAL DAILY
Qty: 90 TABLET | Refills: 3 | Status: SHIPPED | OUTPATIENT
Start: 2024-02-27

## 2024-02-27 SDOH — HEALTH STABILITY: PHYSICAL HEALTH: ON AVERAGE, HOW MANY MINUTES DO YOU ENGAGE IN EXERCISE AT THIS LEVEL?: 10 MIN

## 2024-02-27 SDOH — HEALTH STABILITY: PHYSICAL HEALTH: ON AVERAGE, HOW MANY DAYS PER WEEK DO YOU ENGAGE IN MODERATE TO STRENUOUS EXERCISE (LIKE A BRISK WALK)?: 4 DAYS

## 2024-02-27 ASSESSMENT — SOCIAL DETERMINANTS OF HEALTH (SDOH): HOW OFTEN DO YOU GET TOGETHER WITH FRIENDS OR RELATIVES?: TWICE A WEEK

## 2024-02-27 NOTE — PROGRESS NOTES
Clinic Care Coordination Contact  [facility]/Voicemail    Clinical Data: Care Coordinator Outreach    Outreach Documentation Number of Outreach Attempt   2/27/2024   3:27 PM 1     CHW attempted to call patient, unable to reach and left message on patient's voicemail with call back information and requested return call.    Plan: Care Coordinator will try to reach patient again in 1-2 business days.

## 2024-02-27 NOTE — PROGRESS NOTES
Clinic Care Coordination Contact  Community Health Worker Initial Outreach    CHW Initial Information Gathering:  Referral Source: PCP  Preferred Hospital: [facility]  [phone]  Preferred Urgent Care: [facility] [place], [phone]  Current living arrangement:: I live in a private home with family  Type of residence:: Private home - stairs  Community Resources: PCA  Supplies Currently Used at Home: None  Informal Support system:: Family  No PCP office visit in Past Year: No  Transportation means:: Medical transport, Family  CHW Additional Questions  If ED/Hospital discharge, follow-up appointment scheduled as recommended?: N/A  Medication changes made following ED/Hospital discharge?: N/A  MyChart active?: No  Patient agreeable to assistance with activating MyChart?: No    Patient accepts CC: No, patient does not need CCC services at this time. Patient will be sent Care Coordination introduction letter for future reference.     Family member or caregiver are able to set up medications with no issue. Family is inquiring for more PCA hours and caregiver will go to the PCA agency and request for early assessment if needed. CCC will no further do outreach and PCP feel free to place new referral if need(s) identify.

## 2024-02-27 NOTE — PROGRESS NOTES
Preventive Care Visit  [facility] VALERIANO Geiger MD, Family Medicine  Feb 27, 2024    Assessment & Plan     Morbid obesity (H)    Patient's weight is been elevated she has been unable to exercise it is difficult for her to walk she has not noted any falls at home.    Benign essential hypertension    Blood pressure elevated she has been taking her medications faithfully denies eating salty food no dizziness headaches reported says that she feels better metoprolol started.  Repeat blood pressure check in 2 weeks with nurse  - Basic metabolic panel  (Ca, Cl, CO2, Creat, Gluc, K, Na, BUN); Future  - metoprolol succinate ER (TOPROL XL) 25 MG 24 hr tablet; Take 1 tablet (25 mg) by mouth daily  - Basic metabolic panel  (Ca, Cl, CO2, Creat, Gluc, K, Na, BUN)    Hyperlipidemia LDL goal <130  Taking her atorvastatin no side effects noted    Routine history and physical examination of adult    Anticipatory guidance discussed with the patient and her grandson today.  She was taking her medications faithfully.  She agrees to blood test.    Tachycardia    Patient denies any dizziness or headache.  She is tachycardic metoprolol started checking a TSH.  She denies any bleeding per rectum no vaginal bleeding she has not had any episodes of weakness at home  - Basic metabolic panel  (Ca, Cl, CO2, Creat, Gluc, K, Na, BUN); Future  - TSH with free T4 reflex; Future  - metoprolol succinate ER (TOPROL XL) 25 MG 24 hr tablet; Take 1 tablet (25 mg) by mouth daily  - CBC with platelets; Future  - Primary Care - Care Coordination Referral; Future  - Basic metabolic panel  (Ca, Cl, CO2, Creat, Gluc, K, Na, BUN)  - TSH with free T4 reflex  - CBC with platelets    Bilateral knee swelling    X-ray personally reviewed today severe degenerative arthritis no located with no evidence of a fracture  with bone-on-bone articulation. Genu varus. Chronic irregularity of the medial tibial plateau with  small marginal  osteophytes.  Will need steroid injections and will need consult on possible operative repair has bone-on-bone articulation is noted bilaterally  .        2/27/2024     9:04 AM   Additional Questions   Roomed by Terri Babb   Accompanied by Son         Patient Active Problem List    Diagnosis Date Noted    Cervical cancer screening 06/22/2023     Priority: Medium     06/14/23 NIL Pap, Neg HR HPV. Plan cotest in 5 years per provider. Per provider office visit note this was her first pap.       Chronic pain of right knee 09/30/2022     Priority: Medium    Morbid obesity (H) 09/29/2021     Priority: Medium    Multiple joint pain 08/25/2021     Priority: Medium    Benign essential hypertension 08/17/2018     Priority: Medium    Hyperlipidemia LDL goal <130 08/17/2018     Priority: Medium      No past medical history on file.  No past surgical history on file.  Current Outpatient Medications   Medication Sig Dispense Refill    atorvastatin (LIPITOR) 20 MG tablet Take 1 tablet (20 mg) by mouth daily 30 tablet 11    atorvastatin (LIPITOR) 40 MG tablet Take 1 tablet (40 mg) by mouth daily 90 tablet 3    celecoxib (CELEBREX) 200 MG capsule Take 1 capsule (200 mg) by mouth daily 60 capsule 11    celecoxib (CELEBREX) 200 MG capsule Take 1 capsule (200 mg) by mouth daily 60 capsule 4    diclofenac (VOLTAREN) 1 % topical gel Apply 4 g topically 4 times daily 350 g 4    DULoxetine (CYMBALTA) 30 MG capsule Take 1 capsule (30 mg) by mouth 2 times daily 60 capsule 4    hydrochlorothiazide (HYDRODIURIL) 25 MG tablet Take 1 tablet (25 mg) by mouth daily 30 tablet 11    lisinopril (ZESTRIL) 40 MG tablet Take 1 tablet (40 mg) by mouth daily 30 tablet 11    metoprolol succinate ER (TOPROL XL) 25 MG 24 hr tablet Take 1 tablet (25 mg) by mouth daily 90 tablet 3       No Known Allergies     Social History     Tobacco Use    Smoking status: Never     Passive exposure: Never    Smokeless tobacco: Never   Substance Use Topics    Alcohol use:  "No       History   Drug Use No         Review of Systems    Review of Systems  Constitutional, neuro, ENT, endocrine, pulmonary, cardiac, gastrointestinal, genitourinary, musculoskeletal, integument and psychiatric systems are negative, except as otherwise noted.    Objective    BP (!) 139/93   Pulse 115   Temp 98.2  F (36.8  C) (Oral)   Resp 20   Ht 1.511 m (4' 11.5\")   Wt 77.6 kg (171 lb 1 oz)   SpO2 96%   BMI 33.97 kg/m     Estimated body mass index is 33.97 kg/m  as calculated from the following:    Height as of this encounter: 1.511 m (4' 11.5\").    Weight as of this encounter: 77.6 kg (171 lb 1 oz).            Signed Electronically by: Jose Geiger MD  Copy of this evaluation report is provided to requesting physician.        steroid injection  - XR Knee AP Standing Bilateral; Future  - Primary Care - Care Coordination Referral; Future            BMI  Estimated body mass index is 33.97 kg/m  as calculated from the following:    Height as of this encounter: 1.511 m (4' 11.5\").    Weight as of this encounter: 77.6 kg (171 lb 1 oz).   Weight management plan: Discussed healthy diet and exercise guidelines    Counseling  Appropriate preventive services were discussed with this patient, including applicable screening as appropriate for fall prevention, nutrition, physical activity, Tobacco-use cessation, weight loss and cognition.  Checklist reviewing preventive services available has been given to the patient.  Reviewed patient's diet, addressing concerns and/or questions.   The patient was instructed to see the dentist every 6 months.           Subjective   Naw is a 64 year old, presenting for the following:  Physical and Letter Request (Requesting more PCA hours )        2/27/2024     9:04 AM   Additional Questions   Roomed by Terri Babb   Accompanied by Son         HPI    64-year-old female here with a medical history significant for obesity, chronic bilateral knee pain, hypertension, hyperlipidemia.  " She is accompanied by her son.  She reports that she feels well except when she tries to walk where she has knee pain.  She denies any dizziness or headache.  She denies any weakness.  Her son states that they need more PCR's that she only gets 5 hours and he has to work at night.              2/27/2024   General Health   How would you rate your overall physical health? Excellent   Feel stress (tense, anxious, or unable to sleep) Not at all         2/27/2024   Nutrition   Three or more servings of calcium each day? (!) NO   Diet: I don't know   How many servings of fruit and vegetables per day? (!) 2-3   How many sweetened beverages each day? 0-1         2/27/2024   Exercise   Days per week of moderate/strenous exercise 4 days   Average minutes spent exercising at this level 10 min         2/27/2024   Social Factors   Frequency of gathering with friends or relatives Twice a week   Worry food won't last until get money to buy more No   Food not last or not have enough money for food? No   Do you have housing?  Yes   Are you worried about losing your housing? No   Lack of transportation? No   Unable to get utilities (heat,electricity)? No         2/27/2024   Fall Risk   Fallen 2 or more times in the past year? No   Trouble with walking or balance? Yes   Gait Speed Test Interpretation Greater than 5.01 seconds - ABNORMAL           2/27/2024   Dental   Dentist two times every year? (!) NO            Today's PHQ-2 Score:       2/27/2024     9:05 AM   PHQ-2 ( 1999 [employer])   Q1: Little interest or pleasure in doing things 0   Q2: Feeling down, depressed or hopeless 0   PHQ-2 Score 0   Q1: Little interest or pleasure in doing things Not at all   Q2: Feeling down, depressed or hopeless Not at all   PHQ-2 Score 0           2/27/2024   Substance Use   Alcohol more than 3/day or more than 7/wk No   Do you use any other substances recreationally? No     Social History     Tobacco Use    Smoking status: Never     Passive  "exposure: Never    Smokeless tobacco: Never   Vaping Use    Vaping Use: Never used   Substance Use Topics    Alcohol use: No    Drug use: No             2/27/2024   Breast Cancer Screening   Family history of breast, colon, or ovarian cancer? No / Unknown          No data to display                         2/27/2024   STI Screening   New sexual partner(s) since last STI/HIV test? No     History of abnormal Pap smear: NO - age 30-65 PAP every 5 years with negative HPV co-testing recommended        Latest Ref Rng & Units 6/14/2023     2:57 PM   PAP / HPV   PAP  Negative for Intraepithelial Lesion or Malignancy (NILM)    HPV 16 DNA Negative Negative    HPV 18 DNA Negative Negative    Other HR HPV Negative Negative      ASCVD Risk   The 10-year ASCVD risk score (Nilam LUCERO, et al., 2019) is: 10.3%    Values used to calculate the score:      Age: 64 years      Sex: Female      Is Non- : No      Diabetic: No      Tobacco smoker: No      Systolic Blood Pressure: 148 mmHg      Is BP treated: Yes      HDL Cholesterol: 50 mg/dL      Total Cholesterol: 277 mg/dL           Reviewed and updated as needed this visit by Provider                          Review of Systems  Constitutional, neuro, ENT, endocrine, pulmonary, cardiac, gastrointestinal, genitourinary, musculoskeletal, integument and psychiatric systems are negative, except as otherwise noted.     Objective    Exam  BP (!) 148/100 (BP Location: Right arm, Patient Position: Sitting, Cuff Size: Adult Regular)   Pulse 115   Temp 98.2  F (36.8  C) (Oral)   Resp 20   Ht 1.511 m (4' 11.5\")   Wt 77.6 kg (171 lb 1 oz)   SpO2 96%   BMI 33.97 kg/m     Estimated body mass index is 33.97 kg/m  as calculated from the following:    Height as of this encounter: 1.511 m (4' 11.5\").    Weight as of this encounter: 77.6 kg (171 lb 1 oz).    Physical Exam  GENERAL: alert and no distress  EYES: Eyes grossly normal to inspection, PERRL and conjunctivae " and sclerae normal  HENT: ear canals and TM's normal, nose and mouth without ulcers or lesions  NECK: no adenopathy, no asymmetry, masses, or scars  RESP: lungs clear to auscultation - no rales, rhonchi or wheezes  CV: tachycardia, normal S1 S2, no S3 or S4, no murmur, click or rub, peripheral pulses strong, and no peripheral edema  ABDOMEN: soft, nontender, no hepatosplenomegaly, no masses and bowel sounds normal  MS: Bilateral tenderness noted over the medial aspect of both patella.  Drawer test produces no discomfort Lachman's test of bilaterally is negative.  Flexion causes discomfort at 30 degrees respectively on the left knee and at 40 degrees respectively on the right.  SKIN: no suspicious lesions or rashes  NEURO: Normal strength and tone, mentation intact and speech normal  PSYCH: mentation appears normal, affect normal/bright      Signed Electronically by: Jose Geiger MD

## 2024-02-28 ENCOUNTER — TELEPHONE (OUTPATIENT)
Dept: FAMILY MEDICINE | Facility: CLINIC | Age: 64
End: 2024-02-28
Payer: COMMERCIAL

## 2024-02-28 LAB
ANION GAP SERPL CALCULATED.3IONS-SCNC: 16 MMOL/L (ref 7–15)
BUN SERPL-MCNC: 12.7 MG/DL (ref 8–23)
CALCIUM SERPL-MCNC: 10.2 MG/DL (ref 8.8–10.2)
CHLORIDE SERPL-SCNC: 99 MMOL/L (ref 98–107)
CREAT SERPL-MCNC: 0.58 MG/DL (ref 0.51–0.95)
DEPRECATED HCO3 PLAS-SCNC: 26 MMOL/L (ref 22–29)
EGFRCR SERPLBLD CKD-EPI 2021: >90 ML/MIN/1.73M2
GLUCOSE SERPL-MCNC: 108 MG/DL (ref 70–99)
POTASSIUM SERPL-SCNC: 3.8 MMOL/L (ref 3.4–5.3)
SODIUM SERPL-SCNC: 141 MMOL/L (ref 135–145)
TSH SERPL DL<=0.005 MIU/L-ACNC: 0.65 UIU/ML (ref 0.3–4.2)

## 2024-02-28 NOTE — RESULT ENCOUNTER NOTE
Please inform patient and her family member that the x-ray shows that she has severe arthritis on both knees the best course would be for her to see a specialist and orthopedics you can offer her treatment which would consist of injections and possible surgery the medication that she takes orally will not help to a significant degree

## 2024-02-28 NOTE — TELEPHONE ENCOUNTER
----- Message from Jose Geiger MD sent at 2/28/2024  8:38 AM CST -----  Please inform patient and her family member that the x-ray shows that she has severe arthritis on both knees the best course would be for her to see a specialist and orthopedics you can offer her treatment which would consist of injections and possib  le surgery the medication that she takes orally will not help to a significant degree

## 2024-02-28 NOTE — PROGRESS NOTES
Called and left voicemail to call back, please relay provider's message below when call back. Thanks.

## 2024-02-29 NOTE — TELEPHONE ENCOUNTER
Relayed message to son. Provided scheduling phone number to Ortho, family will to schedule.     [facility]    [phone].

## 2024-03-01 ENCOUNTER — OFFICE VISIT (OUTPATIENT)
Dept: ORTHOPEDICS | Facility: CLINIC | Age: 64
End: 2024-03-01
Attending: FAMILY MEDICINE
Payer: COMMERCIAL

## 2024-03-01 DIAGNOSIS — Z78.9 IMPAIRED MOBILITY AND ADLS: Primary | ICD-10-CM

## 2024-03-01 DIAGNOSIS — M25.462 BILATERAL KNEE SWELLING: ICD-10-CM

## 2024-03-01 DIAGNOSIS — M25.461 BILATERAL KNEE SWELLING: ICD-10-CM

## 2024-03-01 DIAGNOSIS — M17.0 PRIMARY OSTEOARTHRITIS OF BOTH KNEES: ICD-10-CM

## 2024-03-01 DIAGNOSIS — Z74.09 IMPAIRED MOBILITY AND ADLS: Primary | ICD-10-CM

## 2024-03-01 PROCEDURE — 99204 OFFICE O/P NEW MOD 45 MIN: CPT | Performed by: STUDENT IN AN ORGANIZED HEALTH CARE EDUCATION/TRAINING PROGRAM

## 2024-03-01 NOTE — LETTER
3/1/2024         RE: Breana Conner  [address] [address]        Dear Colleague,    Thank you for referring your patient, Breana Conner, to the [facility]. Please see a copy of my visit note below.    ASSESSMENT & PLAN    Breana was seen today for pain and pain.    Diagnoses and all orders for this visit:    Impaired mobility and ADLs  -     Walker Order for DME - ONLY FOR DME    Bilateral knee swelling  -     Orthopedic  Referral    Primary osteoarthritis of both knees  -     Orthopedic  Referral  -     Physical Therapy  Referral; Future  -     Cancel: Orthopedic  Referral; Future  -     Walker Order for DME - ONLY FOR DME  -     Orthopedic  Referral; Future        Severe bone and bone arthritis unable to bear weight due to pain beyond few steps for greater than one year.  Walking with crutches for assistance at this time. Trialed CSI two years ago without relief. Not interested in repeat injection today.   -Due to fall risk and impaired gait unable to walk and difficulty performing ADLs will get walker to help with ambulation.   -Genu varus right also factor in walking and ambulating.   -Interested in knee arthroplasty will refer for surgery given severe nature and inability to ambulate. Has done physical therapy as well without relief in past. However given weakness on exam will again refer to PT for gait and ambulation.   -Again would recommend physical therapy to strengthen knee.   -Will refer to surgery for arthroplasty and consider gel injection for opposite knee however unsure at this time which knee or if she would qualify for surgery.  --Physical therapy ordered. Call to schedule. Number to call in AVS.   -Continue Celebrex and voltaren gel for pain relief  -Braces may not be best option as not ambulating beyond few steps but can consider in future.       Flower Lazaro DO  [facility]    Due to language barrier, an  was present during the history-taking and subsequent discussion (and for part of the physical exam) with this patient.       -----  Chief Complaint   Patient presents with     Right Knee - Pain     Left Knee - Pain       SUBJECTIVE  Naw ALLISON Conner is a/an 64 year old female who is seen in consultation at the request of  Jose Geiger M.D. for evaluation of bilateral knee pain.     The patient is seen with their son.      Onset: 2 years(s) ago. Reports insidious onset without acute precipitating event.  Location of Pain: bilaterally medial joint line  Worsened by: walking  Better with: oral Celebrex medication, voltaren  Treatments tried: corticosteroid injection (most recent date: 2 years ago) that provided no relief  Associated symptoms: shapr pains in the knee, swelling, difficulty ambulating.  Denies numbness, tingling, locking  Orthopedic/Surgical history: NO      Patient Active Problem List   Diagnosis     Benign essential hypertension     Hyperlipidemia LDL goal <130     Multiple joint pain     Morbid obesity (H)     Chronic pain of right knee     Cervical cancer screening       Current Outpatient Medications   Medication Sig Dispense Refill     atorvastatin (LIPITOR) 20 MG tablet Take 1 tablet (20 mg) by mouth daily 30 tablet 11     atorvastatin (LIPITOR) 40 MG tablet Take 1 tablet (40 mg) by mouth daily 90 tablet 3     celecoxib (CELEBREX) 200 MG capsule Take 1 capsule (200 mg) by mouth daily 60 capsule 11     celecoxib (CELEBREX) 200 MG capsule Take 1 capsule (200 mg) by mouth daily 60 capsule 4     diclofenac (VOLTAREN) 1 % topical gel Apply 4 g topically 4 times daily 350 g 4     DULoxetine (CYMBALTA) 30 MG capsule Take 1 capsule (30 mg) by mouth 2 times daily 60 capsule 4     hydrochlorothiazide (HYDRODIURIL) 25 MG tablet Take 1 tablet (25 mg) by mouth daily 30 tablet 11     lisinopril (ZESTRIL) 40 MG tablet Take 1 tablet (40 mg) by  mouth daily 30 tablet 11     metoprolol succinate ER (TOPROL XL) 25 MG 24 hr tablet Take 1 tablet (25 mg) by mouth daily 90 tablet 3       PMH, Medications and Allergies were reviewed and updated as needed.    REVIEW OF SYSTEMS:  10 point ROS is negative other than symptoms noted above in HPI        OBJECTIVE:  There were no vitals taken for this visit.   General: healthy, alert and in no distress in wheelchair  Skin: no suspicious lesions or rash.  CV: distal perfusion intact  Resp: normal respiratory effort without conversational dyspnea   Psych: normal mood and affect  Gait: antalgic, broad-based, and crutches  Neuro: Normal light sensory exam of lower extremity     BILATERAL KNEE  Inspection:    Normal alignment; no edema, erythema, or ecchymosis present  Palpation:    Tender about the lateral joint line and medial joint line. Inferior patella. Remainder of bony and ligamentous landmarks are nontender.    Mild effusion is present    Patellofemoral crepitus is Present  Range of Motion:     00 extension to 1200 flexion  Strength:    Quadriceps 5/5    Extensor mechanism intact  Special Tests:    Positive: Patellar grind    Negative: MCL/valgus stress (0 & 30 deg), LCL/varus stress (0 & 30 deg), Lachman's 1A       RADIOLOGY:  Final results and radiologist's interpretation, available in the [facility] health record.  Images were reviewed with the patient in the office today.    My personal interpretation of the performed imaging:  Bilateral knee x-ray 1 view AP standing  2/27/2024:  Right knee: Genu varus  Advanced severe degenerative medial compartment narrowing with bone-on-bone articulation.  Chondrocalcinosis.  Tricompartmental arthritis.  Left knee: Advanced severe degenerative arthritis in the medial compartment with bone-on-bone.  Tricompartmental arthritis with osteophytes.        >45 minutes spent by me on the date of the encounter doing chart review, review of outside records, review of test results,  interpretation of tests, patient visit, and documentation          Disclaimer: This note consists of symbols derived from keyboarding, dictation and/or voice recognition software. As a result, there may be errors in the script that have gone undetected. Please consider this when interpreting information found in this chart.       Again, thank you for allowing me to participate in the care of your patient.        Sincerely,        Flower Lazaro MD

## 2024-03-01 NOTE — PROGRESS NOTES
ASSESSMENT & PLAN    Breana was seen today for pain and pain.    Diagnoses and all orders for this visit:    Impaired mobility and ADLs  -     Walker Order for DME - ONLY FOR DME    Bilateral knee swelling  -     Orthopedic  Referral    Primary osteoarthritis of both knees  -     Orthopedic  Referral  -     Physical Therapy  Referral; Future  -     Cancel: Orthopedic  Referral; Future  -     Walker Order for DME - ONLY FOR DME  -     Orthopedic  Referral; Future        Severe bone and bone arthritis unable to bear weight due to pain beyond few steps for greater than one year.  Walking with crutches for assistance at this time. Trialed CSI two years ago without relief. Not interested in repeat injection today.   -Due to fall risk and impaired gait unable to walk and difficulty performing ADLs will get walker to help with ambulation.   -Genu varus right also factor in walking and ambulating.   -Interested in knee arthroplasty will refer for surgery given severe nature and inability to ambulate. Has done physical therapy as well without relief in past. However given weakness on exam will again refer to PT for gait and ambulation.   -Again would recommend physical therapy to strengthen knee.   -Will refer to surgery for arthroplasty and consider gel injection for opposite knee however unsure at this time which knee or if she would qualify for surgery.  --Physical therapy ordered. Call to schedule. Number to call in AVS.   -Continue Celebrex and voltaren gel for pain relief  -Braces may not be best option as not ambulating beyond few steps but can consider in future.       Flower Lazaro DO  [facility]    Due to language barrier, an  was present during the history-taking and subsequent discussion (and for part of the physical exam) with this patient.       -----  Chief Complaint   Patient presents with    Right Knee -  Pain    Left Knee - Pain       SUBJECTIVE  Naw S Dalila is a/an 64 year old female who is seen in consultation at the request of  Jose Geiger M.D. for evaluation of bilateral knee pain.     The patient is seen with their son.      Onset: 2 years(s) ago. Reports insidious onset without acute precipitating event.  Location of Pain: bilaterally medial joint line  Worsened by: walking  Better with: oral Celebrex medication, voltaren  Treatments tried: corticosteroid injection (most recent date: 2 years ago) that provided no relief  Associated symptoms: shapr pains in the knee, swelling, difficulty ambulating.  Denies numbness, tingling, locking  Orthopedic/Surgical history: NO      Patient Active Problem List   Diagnosis    Benign essential hypertension    Hyperlipidemia LDL goal <130    Multiple joint pain    Morbid obesity (H)    Chronic pain of right knee    Cervical cancer screening       Current Outpatient Medications   Medication Sig Dispense Refill    atorvastatin (LIPITOR) 20 MG tablet Take 1 tablet (20 mg) by mouth daily 30 tablet 11    atorvastatin (LIPITOR) 40 MG tablet Take 1 tablet (40 mg) by mouth daily 90 tablet 3    celecoxib (CELEBREX) 200 MG capsule Take 1 capsule (200 mg) by mouth daily 60 capsule 11    celecoxib (CELEBREX) 200 MG capsule Take 1 capsule (200 mg) by mouth daily 60 capsule 4    diclofenac (VOLTAREN) 1 % topical gel Apply 4 g topically 4 times daily 350 g 4    DULoxetine (CYMBALTA) 30 MG capsule Take 1 capsule (30 mg) by mouth 2 times daily 60 capsule 4    hydrochlorothiazide (HYDRODIURIL) 25 MG tablet Take 1 tablet (25 mg) by mouth daily 30 tablet 11    lisinopril (ZESTRIL) 40 MG tablet Take 1 tablet (40 mg) by mouth daily 30 tablet 11    metoprolol succinate ER (TOPROL XL) 25 MG 24 hr tablet Take 1 tablet (25 mg) by mouth daily 90 tablet 3       PMH, Medications and Allergies were reviewed and updated as needed.    REVIEW OF SYSTEMS:  10 point ROS is negative other than symptoms noted  above in HPI        OBJECTIVE:  There were no vitals taken for this visit.   General: healthy, alert and in no distress in wheelchair  Skin: no suspicious lesions or rash.  CV: distal perfusion intact  Resp: normal respiratory effort without conversational dyspnea   Psych: normal mood and affect  Gait: antalgic, broad-based, and crutches  Neuro: Normal light sensory exam of lower extremity     BILATERAL KNEE  Inspection:    Normal alignment; no edema, erythema, or ecchymosis present  Palpation:    Tender about the lateral joint line and medial joint line. Inferior patella. Remainder of bony and ligamentous landmarks are nontender.    Mild effusion is present    Patellofemoral crepitus is Present  Range of Motion:     00 extension to 1200 flexion  Strength:    Quadriceps 5/5    Extensor mechanism intact  Special Tests:    Positive: Patellar grind    Negative: MCL/valgus stress (0 & 30 deg), LCL/varus stress (0 & 30 deg), Lachman's 1A       RADIOLOGY:  Final results and radiologist's interpretation, available in the [facility] health record.  Images were reviewed with the patient in the office today.    My personal interpretation of the performed imaging:  Bilateral knee x-ray 1 view AP standing  2/27/2024:  Right knee: Genu varus  Advanced severe degenerative medial compartment narrowing with bone-on-bone articulation.  Chondrocalcinosis.  Tricompartmental arthritis.  Left knee: Advanced severe degenerative arthritis in the medial compartment with bone-on-bone.  Tricompartmental arthritis with osteophytes.        >45 minutes spent by me on the date of the encounter doing chart review, review of outside records, review of test results, interpretation of tests, patient visit, and documentation          Disclaimer: This note consists of symbols derived from keyboarding, dictation and/or voice recognition software. As a result, there may be errors in the script that have gone undetected. Please consider this when interpreting  information found in this chart.

## 2024-03-01 NOTE — PATIENT INSTRUCTIONS
1. Bilateral knee swelling    2. Primary osteoarthritis of both knees        - Severe arthritis unable to bear weight due to pain beyond few steps. Walking with crutches. Due to fall risk and impaired gait will get walker to help with ambulation.   Genu varus right also factor in walking and ambulating.   -Trialed CSI two years ago without relief. Not interested in repeat injection today  -Interested in knee arthroplasty will refer for surgery given severe nature and inability to ambulate. Has done physical therapy as well without relief in past. However given weakness on exam will again refer to PT for gait and ambulation.   -Again would recommend physical therapy to strengthen knee.   -Will refer to surgery for arthroplasty and consider gel injection fo opposite knee however unsure at this time which knee or if she would qualify for surgery.  --Physical therapy ordered. Call to schedule. Number to call in AVS.   -Continue Celebrex and voltaren gel for pain relief  -Braces may not be best option as not ambulating beyond few steps but can consider in future.         Please call [phone]  Ask for my team if you have any questions or concerns    Flower Lazaro DO  [facility]      Thank you for choosing [facility]!    CLINIC LOCATIONS:     [place]  TRIAGE LINE: [phone] [address] [facility] APPOINTMENTS: [phone]   [place] [address] RADIOLOGY: [phone]   (Thursday & Friday) PHYSICAL THERAPY: [phone]    BILLING QUESTIONS: [phone]   [place] FAX: [phone] [address]    [place] [address]    (Monday & Wednesday          housestaff resident and Dr Cm housestaff patient

## 2024-03-12 ENCOUNTER — ALLIED HEALTH/NURSE VISIT (OUTPATIENT)
Dept: FAMILY MEDICINE | Facility: CLINIC | Age: 64
End: 2024-03-12
Payer: COMMERCIAL

## 2024-03-12 VITALS
HEART RATE: 111 BPM | OXYGEN SATURATION: 95 % | TEMPERATURE: 98.4 F | DIASTOLIC BLOOD PRESSURE: 74 MMHG | RESPIRATION RATE: 20 BRPM | SYSTOLIC BLOOD PRESSURE: 124 MMHG

## 2024-03-12 DIAGNOSIS — I10 BENIGN ESSENTIAL HYPERTENSION: Primary | ICD-10-CM

## 2024-03-12 PROCEDURE — 99207 PR NO CHARGE NURSE ONLY: CPT

## 2024-03-12 NOTE — PROGRESS NOTES
I met with Breana Conner at the request of Dr. Geiger to recheck her blood pressure.  Blood pressure medications on the med list were reviewed with patient.    Patient has taken all medications as per usual regimen: Yes  Patient reports tolerating them without any issues or concerns: No    BP: 124/74  Pulse: 111  O2: 95 %  RR: 20  Temperature: 98.4 F  Asymptomatic    Blood pressure was taken, previous encounter was reviewed, recorded blood pressure below 140/90.  Patient was discharged and the note will be sent to the provider for final review.      Patient would like to ask about increasing PCA hours. Would like doctor to order the assessment so they could come re assess patient so that son could get more PCA hours at night.    Routing message to Dr. Geiger to review and advise on patient request for another PCA repeal to obtain more PCA hours.    Geovany Najera, HUNGN, RN   [facility]

## 2024-03-14 ENCOUNTER — OFFICE VISIT (OUTPATIENT)
Dept: ORTHOPEDICS | Facility: CLINIC | Age: 64
End: 2024-03-14
Attending: STUDENT IN AN ORGANIZED HEALTH CARE EDUCATION/TRAINING PROGRAM
Payer: COMMERCIAL

## 2024-03-14 ENCOUNTER — ANCILLARY PROCEDURE (OUTPATIENT)
Dept: GENERAL RADIOLOGY | Facility: CLINIC | Age: 64
End: 2024-03-14
Attending: STUDENT IN AN ORGANIZED HEALTH CARE EDUCATION/TRAINING PROGRAM
Payer: COMMERCIAL

## 2024-03-14 VITALS — HEIGHT: 60 IN | WEIGHT: 171 LBS | BODY MASS INDEX: 33.57 KG/M2

## 2024-03-14 DIAGNOSIS — M17.0 PRIMARY OSTEOARTHRITIS OF BOTH KNEES: ICD-10-CM

## 2024-03-14 PROCEDURE — 73564 X-RAY EXAM KNEE 4 OR MORE: CPT | Mod: TC | Performed by: RADIOLOGY

## 2024-03-14 PROCEDURE — 99214 OFFICE O/P EST MOD 30 MIN: CPT | Performed by: STUDENT IN AN ORGANIZED HEALTH CARE EDUCATION/TRAINING PROGRAM

## 2024-03-14 NOTE — LETTER
3/14/2024         RE: Breana Conner  [address] [place] [address]        Dear Colleague,    Thank you for referring your patient, Breana Conner, to the [facility]. Please see a copy of my visit note below.    CC: Bilateral knee pain    HPI: The use of a virtual  was utilized for this visit and we appreciate their assistance.    Patient is a 64-year-old female seen here today with her adult son for bilateral knee pain.  Left is greater than right.  She localizes the pain to the anterior aspect of her knees.  She pain with ambulation.  Her knees feel stiff after sitting for long periods of time.  They are stiff in the morning.  Her knees also feel unstable.  Her pain and instability are limiting her ability to ambulate long distances.  She uses a walker.  She is taken Voltaren and duloxetine for pain.  She is currently doing physical therapy.  She has had intra-articular corticosteroid injections in the past.  States that they did not provide lasting relief.  These were greater than 1 year ago.  She is never any surgery to her knees.     She is otherwise healthy.  She is retired.  She does not smoke.  She enjoys walking for exercise,         Patient Active Problem List   Diagnosis     Benign essential hypertension     Hyperlipidemia LDL goal <130     Multiple joint pain     Morbid obesity (H)     Chronic pain of right knee     Cervical cancer screening        No past medical history on file.     No past surgical history on file.       Current Outpatient Medications   Medication Sig Dispense Refill     atorvastatin (LIPITOR) 20 MG tablet Take 1 tablet (20 mg) by mouth daily 30 tablet 11     atorvastatin (LIPITOR) 40 MG tablet Take 1 tablet (40 mg) by mouth daily 90 tablet 3     celecoxib (CELEBREX) 200 MG capsule Take 1 capsule (200 mg) by mouth daily 60 capsule 11     celecoxib (CELEBREX) 200 MG capsule Take 1 capsule (200 mg) by mouth daily 60 capsule 4      diclofenac (VOLTAREN) 1 % topical gel Apply 4 g topically 4 times daily 350 g 4     DULoxetine (CYMBALTA) 30 MG capsule Take 1 capsule (30 mg) by mouth 2 times daily 60 capsule 4     hydrochlorothiazide (HYDRODIURIL) 25 MG tablet Take 1 tablet (25 mg) by mouth daily 30 tablet 11     lisinopril (ZESTRIL) 40 MG tablet Take 1 tablet (40 mg) by mouth daily 30 tablet 11     metoprolol succinate ER (TOPROL XL) 25 MG 24 hr tablet Take 1 tablet (25 mg) by mouth daily 90 tablet 3        No Known Allergies       Family History   Problem Relation Age of Onset     Breast Cancer No family hx of      Ovarian Cancer No family hx of           Social History     Tobacco Use     Smoking status: Never     Passive exposure: Never     Smokeless tobacco: Never   Substance Use Topics     Alcohol use: No            Objective:  Physical Exam:  LLE: No pain with axial load or logroll of the hip.  No open wounds, lacerations, or prior surgical incisions about the knee.  No significant edema or ecchymosis.  No erythema or drainage.  No effusion of the knee joint.  Mild pain to palpation over the medial and lateral joint line.  Mild pain to palpation over the patella and patellar tendon.  No pain to palpation over the femoral origin or tibial insertion of the MCL.  No pain to palpation over the femoral origin or fibular insertion of the LCL.  Passive range of motion 0 to 120 degrees of knee flexion.  Active range of motion is equivalent to passive range of motion.  4+/5 strength in flexion and extension limited by pain.  Patient ambulates with antalgic gait.  There is a notable varus deformity to the left lower extremity with ambulation, approximately 20 degrees.  Grade 1 laxity to varus and valgus stress at 0 and 30 degrees of knee flexion with firm endpoint.  Negative Lachman.  Stable anterior posterior drawer.  Alphonso negative. Grossly neurovascular intact.    Imagin view x-ray of bilateral knees from today was reviewed.  This is  notable for complete loss of joint space on the medial compartment of both the left and right knee.  There is significant varus deformity.  There is widening of the lateral compartment, suggestive of lateral ligamentous laxity.  There is significant bony loss and deformity the medial tibial plateau both knees, contributing to the varus alignment.    Assessment and Plan: Patient is a 64-year-old female who presents here today with bilateral knee osteoarthritis and significant bony varus deformity.  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  She also has significant clinical varus deformity. I reviewed her images with them today.  We reviewed the diagnosis, natural history, and prognosis.  We discussed both operative and nonoperative treatment options.  We discussed nonoperative management in the form of low impact aerobic exercise, weight loss, oral anti-inflammatory pain medication, bracing, physical therapy, and intra-articular corticosteroid and hyaluronic acid injections.  We also discussed operative intervention in the form of total knee arthroplasty.  We reviewed the risks and benefits including but not limited to bleeding, infection requiring revision surgery, loss of range of motion, loss of function, damage surrounding nerves and blood vessels, failure to cure pain, loss of limb and loss of life.  We also discussed the expected postoperative course after a total knee arthroplasty.  I had the opportunity to answer her questions at this time.     At this time, her and her son feel that she has trialed and failed nonoperative management.  They are interesting in moving forward with a total knee arthroplasty.  Given her significant tibial deformity, varus valgus laxity, and overall petite size, I would like to obtain an MRI for preoperative templating.  She may require a very small size component.  Additionally she may require a varus valgus  constrained component.  I will plan for an MRI without contrast of the left knee with the [employer] signature 1 PSI protocol.  We also obtain leg length alignment at that time.  She will follow-up with me in 3 to 4 weeks to go over the imaging and further plan her surgery    Gerardo Leon MD    [facility]   Department of Orthopedic Surgery      Disclaimer: This note consists of symbols derived from keyboarding, dictation and/or voice recognition software. As a result, there may be errors in the script that have gone undetected. Please consider this when interpreting information found in this chart.         Again, thank you for allowing me to participate in the care of your patient.        Sincerely,        Gerardo Leon MD

## 2024-03-14 NOTE — PATIENT INSTRUCTIONS
[facility]   [address] [place] [address], [address]  [place] [address] [address] [place] [address]   Appointments: [phone] Appointments: [phone]   Fax: [phone] Fax: [phone]       1. Primary osteoarthritis of both knees      For scheduling at [facility] ([facility], [place]), call [phone] or [phone]         Call my office with any questions or concerns, [phone].

## 2024-03-15 NOTE — PROGRESS NOTES
CC: Bilateral knee pain    HPI: The use of a virtual  was utilized for this visit and we appreciate their assistance.    Patient is a 64-year-old female seen here today with her adult son for bilateral knee pain.  Left is greater than right.  She localizes the pain to the anterior aspect of her knees.  She pain with ambulation.  Her knees feel stiff after sitting for long periods of time.  They are stiff in the morning.  Her knees also feel unstable.  Her pain and instability are limiting her ability to ambulate long distances.  She uses a walker.  She is taken Voltaren and duloxetine for pain.  She is currently doing physical therapy.  She has had intra-articular corticosteroid injections in the past.  States that they did not provide lasting relief.  These were greater than 1 year ago.  She is never any surgery to her knees.     She is otherwise healthy.  She is retired.  She does not smoke.  She enjoys walking for exercise,         Patient Active Problem List   Diagnosis    Benign essential hypertension    Hyperlipidemia LDL goal <130    Multiple joint pain    Morbid obesity (H)    Chronic pain of right knee    Cervical cancer screening        No past medical history on file.     No past surgical history on file.       Current Outpatient Medications   Medication Sig Dispense Refill    atorvastatin (LIPITOR) 20 MG tablet Take 1 tablet (20 mg) by mouth daily 30 tablet 11    atorvastatin (LIPITOR) 40 MG tablet Take 1 tablet (40 mg) by mouth daily 90 tablet 3    celecoxib (CELEBREX) 200 MG capsule Take 1 capsule (200 mg) by mouth daily 60 capsule 11    celecoxib (CELEBREX) 200 MG capsule Take 1 capsule (200 mg) by mouth daily 60 capsule 4    diclofenac (VOLTAREN) 1 % topical gel Apply 4 g topically 4 times daily 350 g 4    DULoxetine (CYMBALTA) 30 MG capsule Take 1 capsule (30 mg) by mouth 2 times daily 60 capsule 4    hydrochlorothiazide (HYDRODIURIL) 25 MG tablet Take 1 tablet (25 mg) by mouth  daily 30 tablet 11    lisinopril (ZESTRIL) 40 MG tablet Take 1 tablet (40 mg) by mouth daily 30 tablet 11    metoprolol succinate ER (TOPROL XL) 25 MG 24 hr tablet Take 1 tablet (25 mg) by mouth daily 90 tablet 3        No Known Allergies       Family History   Problem Relation Age of Onset    Breast Cancer No family hx of     Ovarian Cancer No family hx of           Social History     Tobacco Use    Smoking status: Never     Passive exposure: Never    Smokeless tobacco: Never   Substance Use Topics    Alcohol use: No            Objective:  Physical Exam:  LLE: No pain with axial load or logroll of the hip.  No open wounds, lacerations, or prior surgical incisions about the knee.  No significant edema or ecchymosis.  No erythema or drainage.  No effusion of the knee joint.  Mild pain to palpation over the medial and lateral joint line.  Mild pain to palpation over the patella and patellar tendon.  No pain to palpation over the femoral origin or tibial insertion of the MCL.  No pain to palpation over the femoral origin or fibular insertion of the LCL.  Passive range of motion 0 to 120 degrees of knee flexion.  Active range of motion is equivalent to passive range of motion.  4+/5 strength in flexion and extension limited by pain.  Patient ambulates with antalgic gait.  There is a notable varus deformity to the left lower extremity with ambulation, approximately 20 degrees.  Grade 1 laxity to varus and valgus stress at 0 and 30 degrees of knee flexion with firm endpoint.  Negative Lachman.  Stable anterior posterior drawer.  Alphonso negative. Grossly neurovascular intact.    Imagin view x-ray of bilateral knees from today was reviewed.  This is notable for complete loss of joint space on the medial compartment of both the left and right knee.  There is significant varus deformity.  There is widening of the lateral compartment, suggestive of lateral ligamentous laxity.  There is significant bony loss and  deformity the medial tibial plateau both knees, contributing to the varus alignment.    Assessment and Plan: Patient is a 64-year-old female who presents here today with bilateral knee osteoarthritis and significant bony varus deformity.  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  She also has significant clinical varus deformity. I reviewed her images with them today.  We reviewed the diagnosis, natural history, and prognosis.  We discussed both operative and nonoperative treatment options.  We discussed nonoperative management in the form of low impact aerobic exercise, weight loss, oral anti-inflammatory pain medication, bracing, physical therapy, and intra-articular corticosteroid and hyaluronic acid injections.  We also discussed operative intervention in the form of total knee arthroplasty.  We reviewed the risks and benefits including but not limited to bleeding, infection requiring revision surgery, loss of range of motion, loss of function, damage surrounding nerves and blood vessels, failure to cure pain, loss of limb and loss of life.  We also discussed the expected postoperative course after a total knee arthroplasty.  I had the opportunity to answer her questions at this time.     At this time, her and her son feel that she has trialed and failed nonoperative management.  They are interesting in moving forward with a total knee arthroplasty.  Given her significant tibial deformity, varus valgus laxity, and overall petite size, I would like to obtain an MRI for preoperative templating.  She may require a very small size component.  Additionally she may require a varus valgus constrained component.  I will plan for an MRI without contrast of the left knee with the [employer] signature 1 PSI protocol.  We also obtain leg length alignment at that time.  She will follow-up with me in 3 to 4 weeks to go over the imaging and further plan  her surgery    Gerardo Leon MD    [facility]   Department of Orthopedic Surgery      Disclaimer: This note consists of symbols derived from keyboarding, dictation and/or voice recognition software. As a result, there may be errors in the script that have gone undetected. Please consider this when interpreting information found in this chart.      no

## 2024-05-24 ENCOUNTER — ANCILLARY PROCEDURE (OUTPATIENT)
Dept: MRI IMAGING | Facility: CLINIC | Age: 64
End: 2024-05-24
Attending: STUDENT IN AN ORGANIZED HEALTH CARE EDUCATION/TRAINING PROGRAM
Payer: COMMERCIAL

## 2024-05-24 DIAGNOSIS — M17.0 PRIMARY OSTEOARTHRITIS OF BOTH KNEES: ICD-10-CM

## 2024-07-09 ENCOUNTER — PATIENT OUTREACH (OUTPATIENT)
Dept: CARE COORDINATION | Facility: CLINIC | Age: 64
End: 2024-07-09
Payer: COMMERCIAL

## 2024-07-09 NOTE — PROGRESS NOTES
Clinic Care Coordination Contact  Program:   [facility]: [place]    Renewal:UCARE   Date Applied:      DERIC Outreach:   7/9/24: CTA called to see if patient needed assistance with their [employer] Renewal. Patient declined needing assistance and no follow up needed   CTA WILL MAIL APPLICATION TO PT.   Tiffany Jones   [facility] Care Coordination  [phone]    7/9/24: 1st outreach attempt. Left a message on [employer] with call back information and requested return call.  Plan: CTA will call again within 2 weeks.  Tiffany Jones   [facility] Care Coordination  [phone]      Health Insurance:        Referral/Screening:

## 2024-07-09 NOTE — PROGRESS NOTES
Clinic Care Coordination Contact  Program:   [facility]: [place]    Renewal:UCARE   Date Applied:      DERIC Outreach:   7/9/24: 1st outreach attempt. Left a message on voicemail with call back information and requested return call.  Plan: CTA will call again within 2 weeks.  Tiffany Sanches  Care   [facility]  Clinic Care Coordination  [phone]      Health Insurance:        Referral/Screening:

## 2024-09-12 ENCOUNTER — OFFICE VISIT (OUTPATIENT)
Dept: ORTHOPEDICS | Facility: CLINIC | Age: 64
End: 2024-09-12
Payer: COMMERCIAL

## 2024-09-12 DIAGNOSIS — M17.0 PRIMARY OSTEOARTHRITIS OF BOTH KNEES: Primary | ICD-10-CM

## 2024-09-12 PROCEDURE — 99213 OFFICE O/P EST LOW 20 MIN: CPT | Performed by: STUDENT IN AN ORGANIZED HEALTH CARE EDUCATION/TRAINING PROGRAM

## 2024-09-12 NOTE — PROGRESS NOTES
CC: Left greater than right knee pain    HPI: The use of a virtual  was utilized for this visit we appreciate their assistance.    Patient is seen here today with her adult son.  She is known to my clinic with bilateral knee arthritis.  Left greater than right.  She has significant varus deformity.  We discussed treatment options at last appointment.  An MRI was performed to preoperatively template her left knee using the [employer] signature 1 protocol.  Unfortunately she has been lost to follow-up since that time.  She continues to have pain over the medial aspect of her knee and both knees.  She takes Celebrex as needed for the pain.  She has not been doing any recent physical therapy.  She has not had any recent injections to her knee.  She is not a prior surgery to the knee    Objective:   PE:  B/L LE: Clinical varus deformity.  Trace joint effusion.  Passive range of motion is 0-1 3 degrees any flexion.  Stable to valgus stress.  Grade 2 opening to varus stress with endpoint.  Active range of motion: To passive range of motion.  Pain to palpation over the medial joint space.  Patellar grind positive for pain and crepitus.      A/P:  Patient is a 64-year-old female known to my practice with bilateral knee osteoarthritis and significant varus deformity with the left knee being more symptomatic.  At last appoint we discussed treatment options clued oral anti-inflammatory medication, physical therapy, medial  bracing, intra-articular corticosteroid or hyaluronic acid injection.  We discussed the operative option in the form of left total knee arthroplasty.  Given her significant varus deformity she may require augmentation.  Given her size I was concerned about having a small of implant.  MRI without contrast of the left knee using the [employer] protocol was performed and we have templated her knee.  She would be a candidate for a nonaugmented PS persona total knee arthroplasty  with a size 5 femur and C tibia.  We discussed the risk and benefits of operative intervention today as well as the postoperative protocol.  She discussed treatment options with her son.  She is interested in moving forward with a bilateral HA injection.  Will seek prior authorization from her insurance.  I scheduled her that I would not recommend surgical intervention within 3 months of an injection.  She will follow with me in clinic in 2 weeks for bilateral hyaluronic acid injections    Gerardo Leon MD    [facility]   Department of Orthopedic Surgery      Disclaimer: This note consists of symbols derived from keyboarding, dictation and/or voice recognition software. As a result, there may be errors in the script that have gone undetected. Please consider this when interpreting information found in this chart.

## 2024-09-12 NOTE — LETTER
9/12/2024      Breana Conner  2013 [facility] [address]      Dear Colleague,    Thank you for referring your patient, Breana Conner, to the [facility]. Please see a copy of my visit note below.    CC: Left greater than right knee pain    HPI: The use of a virtual  was utilized for this visit we appreciate their assistance.    Patient is seen here today with her adult son.  She is known to my clinic with bilateral knee arthritis.  Left greater than right.  She has significant varus deformity.  We discussed treatment options at last appointment.  An MRI was performed to preoperatively template her left knee using the [employer] signature 1 protocol.  Unfortunately she has been lost to follow-up since that time.  She continues to have pain over the medial aspect of her knee and both knees.  She takes Celebrex as needed for the pain.  She has not been doing any recent physical therapy.  She has not had any recent injections to her knee.  She is not a prior surgery to the knee    Objective:   PE:  B/L LE: Clinical varus deformity.  Trace joint effusion.  Passive range of motion is 0-1 3 degrees any flexion.  Stable to valgus stress.  Grade 2 opening to varus stress with endpoint.  Active range of motion: To passive range of motion.  Pain to palpation over the medial joint space.  Patellar grind positive for pain and crepitus.      A/P:  Patient is a 64-year-old female known to my practice with bilateral knee osteoarthritis and significant varus deformity with the left knee being more symptomatic.  At last appoint we discussed treatment options clued oral anti-inflammatory medication, physical therapy, medial  bracing, intra-articular corticosteroid or hyaluronic acid injection.  We discussed the operative option in the form of left total knee arthroplasty.  Given her significant varus deformity she may require augmentation.  Given her size I was  concerned about having a small of implant.  MRI without contrast of the left knee using the [employer] protocol was performed and we have templated her knee.  She would be a candidate for a nonaugmented PS persona total knee arthroplasty with a size 5 femur and C tibia.  We discussed the risk and benefits of operative intervention today as well as the postoperative protocol.  She discussed treatment options with her son.  She is interested in moving forward with a bilateral HA injection.  Will seek prior authorization from her insurance.  I scheduled her that I would not recommend surgical intervention within 3 months of an injection.  She will follow with me in clinic in 2 weeks for bilateral hyaluronic acid injections    Gerardo Leon MD    [facility]   Department of Orthopedic Surgery      Disclaimer: This note consists of symbols derived from keyboarding, dictation and/or voice recognition software. As a result, there may be errors in the script that have gone undetected. Please consider this when interpreting information found in this chart.       Again, thank you for allowing me to participate in the care of your patient.        Sincerely,        Gerardo Leon MD

## 2024-09-26 ENCOUNTER — OFFICE VISIT (OUTPATIENT)
Dept: ORTHOPEDICS | Facility: CLINIC | Age: 64
End: 2024-09-26
Payer: COMMERCIAL

## 2024-09-26 VITALS — BODY MASS INDEX: 33.57 KG/M2 | WEIGHT: 171 LBS | HEIGHT: 60 IN

## 2024-09-26 DIAGNOSIS — M17.0 BILATERAL PRIMARY OSTEOARTHRITIS OF KNEE: Primary | ICD-10-CM

## 2024-09-26 PROCEDURE — 20610 DRAIN/INJ JOINT/BURSA W/O US: CPT | Mod: 50 | Performed by: STUDENT IN AN ORGANIZED HEALTH CARE EDUCATION/TRAINING PROGRAM

## 2024-09-26 NOTE — PROGRESS NOTES
CC: Bilateral knee osteoarthritis    HPI: The use of a virtual  was utilized for this visit and we appreciate their assistance.    Patient is a 64-year-old female known to my practice with bilateral end-stage medial compartment arthritis and varus deformity.  She is seen here today with her adult son.  She is here today for bilateral knee hyaluronic acid injections.  She continues to have pain over the medial side of her knee.  No changes in her health since last office appointment.    Objective:   PE:  B/L LE: Clinical varus deformity noted.  Pain to palpation over the medial joint line.  Patellar grind test positive for pain and crepitus.  Passive range of motion is 0 to 110 degrees any flexion.  Active range of motion is equivalent to passive range of motion.    Procedure:   Written informed consent for bilateral knee intra-articular injection was obtained from the patient after discussing the risk and benefits of the procedure.  Risk and benefits including but not limited to bleeding, infection, failure to cure pain and allergic reaction were discussed.  I first began with the left knee the anterior inferolateral portal was identified by palpation of bony landmarks.  The skin was cleaned with iodine solution.  Under sterile technique the anterior inferolateral portal was utilized to inject the entirety of the Syn[employer] 1 solution.  Soft dressings were applied.  This was then repeated for the right knee.  Patient tolerated the procedure without difficulty.  I counseled the patient on signs and symptoms of allergic reaction and infection.      Large Joint Injection/Arthocentesis: bilateral knee    Date/Time: 9/26/2024 8:36 AM    Performed by: Gerardo Leon MD  Authorized by: Gerardo Leon MD    Indications:  Pain  Needle Size:  22 G  Guidance: landmark guided    Approach:  Anterolateral  Location:  Knee  Laterality:  Bilateral      Medications (Right):  48 mg hylan 48 MG/6ML  Medications (Left):   48 mg hylan 48 MG/6ML  Outcome:  Tolerated well, no immediate complications  Procedure discussed: discussed risks, benefits, and alternatives    Consent Given by:  Patient  Timeout: timeout called immediately prior to procedure    Prep: patient was prepped and draped in usual sterile fashion      A/P:  Patient is a 64-year-old female known to my practice with bilateral knee osteoarthritis seen here today with her adult son for bilateral knee hyaluronic acid injections.  At previous appointments we have discussed treatment options including oral anti-inflammatory medication, activity modification, retrial of intra-articular injections, and total knee arthroplasty.  At her last appointment she desired to trial bilateral knee hyaluronic acid injections.  These have been improved by insurance.  I counseled her today on the risk and benefits of injection clued but limited to allergic reaction, failure to cure pain, and infection.  I described the signs symptoms of allergic reaction or acute septic arthritis.  Bilateral knee hyaluronic acid injections were performed today as highlighted above.  She tolerated the procedure without difficulty.  She will follow-up with me as needed when she feels the effects of these have worn off.    Gerardo Leon MD    [facility]   Department of Orthopedic Surgery      Disclaimer: This note consists of symbols derived from keyboarding, dictation and/or voice recognition software. As a result, there may be errors in the script that have gone undetected. Please consider this when interpreting information found in this chart.

## 2024-09-26 NOTE — LETTER
9/26/2024      Breana Conner  2013 [facility] [address]      Dear Colleague,    Thank you for referring your patient, Breana Conner, to the [facility]. Please see a copy of my visit note below.    CC: Bilateral knee osteoarthritis    HPI: The use of a virtual  was utilized for this visit and we appreciate their assistance.    Patient is a 64-year-old female known to my practice with bilateral end-stage medial compartment arthritis and varus deformity.  She is seen here today with her adult son.  She is here today for bilateral knee hyaluronic acid injections.  She continues to have pain over the medial side of her knee.  No changes in her health since last office appointment.    Objective:   PE:  B/L LE: Clinical varus deformity noted.  Pain to palpation over the medial joint line.  Patellar grind test positive for pain and crepitus.  Passive range of motion is 0 to 110 degrees any flexion.  Active range of motion is equivalent to passive range of motion.    Procedure:   Written informed consent for bilateral knee intra-articular injection was obtained from the patient after discussing the risk and benefits of the procedure.  Risk and benefits including but not limited to bleeding, infection, failure to cure pain and allergic reaction were discussed.  I first began with the left knee the anterior inferolateral portal was identified by palpation of bony landmarks.  The skin was cleaned with iodine solution.  Under sterile technique the anterior inferolateral portal was utilized to inject the entirety of the Synvisc 1 solution.  Soft dressings were applied.  This was then repeated for the right knee.  Patient tolerated the procedure without difficulty.  I counseled the patient on signs and symptoms of allergic reaction and infection.      Large Joint Injection/Arthocentesis: bilateral knee    Date/Time: 9/26/2024 8:36 AM    Performed by: Gerardo Leon,  MD  Authorized by: Gerardo Leon MD    Indications:  Pain  Needle Size:  22 G  Guidance: landmark guided    Approach:  Anterolateral  Location:  Knee  Laterality:  Bilateral      Medications (Right):  48 mg hylan 48 MG/6ML  Medications (Left):  48 mg hylan 48 MG/6ML  Outcome:  Tolerated well, no immediate complications  Procedure discussed: discussed risks, benefits, and alternatives    Consent Given by:  Patient  Timeout: timeout called immediately prior to procedure    Prep: patient was prepped and draped in usual sterile fashion      A/P:  Patient is a 64-year-old female known to my practice with bilateral knee osteoarthritis seen here today with her adult son for bilateral knee hyaluronic acid injections.  At previous appointments we have discussed treatment options including oral anti-inflammatory medication, activity modification, retrial of intra-articular injections, and total knee arthroplasty.  At her last appointment she desired to trial bilateral knee hyaluronic acid injections.  These have been improved by insurance.  I counseled her today on the risk and benefits of injection clued but limited to allergic reaction, failure to cure pain, and infection.  I described the signs symptoms of allergic reaction or acute septic arthritis.  Bilateral knee hyaluronic acid injections were performed today as highlighted above.  She tolerated the procedure without difficulty.  She will follow-up with me as needed when she feels the effects of these have worn off.    Gerardo Leon MD    [facility]   Department of Orthopedic Surgery      Disclaimer: This note consists of symbols derived from keyboarding, dictation and/or voice recognition software. As a result, there may be errors in the script that have gone undetected. Please consider this when interpreting information found in this chart.        Again, thank you for allowing me to participate in the care of your patient.         Sincerely,        Gerardo Leon MD

## 2024-10-03 ENCOUNTER — OFFICE VISIT (OUTPATIENT)
Dept: FAMILY MEDICINE | Facility: CLINIC | Age: 64
End: 2024-10-03
Payer: COMMERCIAL

## 2024-10-03 VITALS
HEART RATE: 104 BPM | BODY MASS INDEX: 35.3 KG/M2 | DIASTOLIC BLOOD PRESSURE: 100 MMHG | OXYGEN SATURATION: 97 % | SYSTOLIC BLOOD PRESSURE: 169 MMHG | HEIGHT: 59 IN | TEMPERATURE: 99.1 F | WEIGHT: 175.12 LBS | RESPIRATION RATE: 16 BRPM

## 2024-10-03 DIAGNOSIS — M17.0 PRIMARY OSTEOARTHRITIS OF BOTH KNEES: ICD-10-CM

## 2024-10-03 DIAGNOSIS — E78.5 HYPERLIPIDEMIA LDL GOAL <130: ICD-10-CM

## 2024-10-03 DIAGNOSIS — Z23 NEED FOR VACCINATION: ICD-10-CM

## 2024-10-03 DIAGNOSIS — Z91.89 AT RISK FOR DENTAL PROBLEMS: ICD-10-CM

## 2024-10-03 DIAGNOSIS — L85.3 XEROSIS CUTIS: ICD-10-CM

## 2024-10-03 DIAGNOSIS — Z76.89 ENCOUNTER TO ESTABLISH CARE: Primary | ICD-10-CM

## 2024-10-03 DIAGNOSIS — D72.829 LEUKOCYTOSIS, UNSPECIFIED TYPE: ICD-10-CM

## 2024-10-03 DIAGNOSIS — I10 BENIGN ESSENTIAL HYPERTENSION: ICD-10-CM

## 2024-10-03 DIAGNOSIS — B35.4 TINEA CORPORIS: ICD-10-CM

## 2024-10-03 PROBLEM — M25.561 CHRONIC PAIN OF RIGHT KNEE: Status: RESOLVED | Noted: 2022-09-30 | Resolved: 2024-10-03

## 2024-10-03 PROBLEM — Z12.4 CERVICAL CANCER SCREENING: Status: RESOLVED | Noted: 2023-06-22 | Resolved: 2024-10-03

## 2024-10-03 PROBLEM — G89.29 CHRONIC PAIN OF RIGHT KNEE: Status: RESOLVED | Noted: 2022-09-30 | Resolved: 2024-10-03

## 2024-10-03 PROBLEM — M25.50 MULTIPLE JOINT PAIN: Status: RESOLVED | Noted: 2021-08-25 | Resolved: 2024-10-03

## 2024-10-03 LAB
BASOPHILS # BLD AUTO: 0 10E3/UL (ref 0–0.2)
BASOPHILS NFR BLD AUTO: 0 %
CHOLEST SERPL-MCNC: 227 MG/DL
EOSINOPHIL # BLD AUTO: 0 10E3/UL (ref 0–0.7)
EOSINOPHIL NFR BLD AUTO: 0 %
ERYTHROCYTE [DISTWIDTH] IN BLOOD BY AUTOMATED COUNT: 14.2 % (ref 10–15)
FASTING STATUS PATIENT QL REPORTED: YES
HCT VFR BLD AUTO: 36 % (ref 35–47)
HDLC SERPL-MCNC: 47 MG/DL
HGB BLD-MCNC: 11.8 G/DL (ref 11.7–15.7)
IMM GRANULOCYTES # BLD: 0.1 10E3/UL
IMM GRANULOCYTES NFR BLD: 1 %
LDLC SERPL CALC-MCNC: 142 MG/DL
LYMPHOCYTES # BLD AUTO: 1 10E3/UL (ref 0.8–5.3)
LYMPHOCYTES NFR BLD AUTO: 8 %
MCH RBC QN AUTO: 28.1 PG (ref 26.5–33)
MCHC RBC AUTO-ENTMCNC: 32.8 G/DL (ref 31.5–36.5)
MCV RBC AUTO: 86 FL (ref 78–100)
MONOCYTES # BLD AUTO: 0.7 10E3/UL (ref 0–1.3)
MONOCYTES NFR BLD AUTO: 5 %
NEUTROPHILS # BLD AUTO: 10.7 10E3/UL (ref 1.6–8.3)
NEUTROPHILS NFR BLD AUTO: 86 %
NONHDLC SERPL-MCNC: 180 MG/DL
PLATELET # BLD AUTO: 298 10E3/UL (ref 150–450)
RBC # BLD AUTO: 4.2 10E6/UL (ref 3.8–5.2)
TRIGL SERPL-MCNC: 191 MG/DL
WBC # BLD AUTO: 12.5 10E3/UL (ref 4–11)

## 2024-10-03 PROCEDURE — 99214 OFFICE O/P EST MOD 30 MIN: CPT | Mod: 25 | Performed by: FAMILY MEDICINE

## 2024-10-03 PROCEDURE — 36415 COLL VENOUS BLD VENIPUNCTURE: CPT | Performed by: FAMILY MEDICINE

## 2024-10-03 PROCEDURE — 80061 LIPID PANEL: CPT | Performed by: FAMILY MEDICINE

## 2024-10-03 PROCEDURE — 90471 IMMUNIZATION ADMIN: CPT | Performed by: FAMILY MEDICINE

## 2024-10-03 PROCEDURE — 91320 SARSCV2 VAC 30MCG TRS-SUC IM: CPT | Performed by: FAMILY MEDICINE

## 2024-10-03 PROCEDURE — 85025 COMPLETE CBC W/AUTO DIFF WBC: CPT | Performed by: FAMILY MEDICINE

## 2024-10-03 PROCEDURE — 90673 RIV3 VACCINE NO PRESERV IM: CPT | Performed by: FAMILY MEDICINE

## 2024-10-03 PROCEDURE — 90480 ADMN SARSCOV2 VAC 1/ONLY CMP: CPT | Performed by: FAMILY MEDICINE

## 2024-10-03 RX ORDER — HYDROCHLOROTHIAZIDE 25 MG/1
25 TABLET ORAL DAILY
Qty: 30 TABLET | Refills: 11 | Status: SHIPPED | OUTPATIENT
Start: 2024-10-03

## 2024-10-03 RX ORDER — AMMONIUM LACTATE 12 G/100G
CREAM TOPICAL 2 TIMES DAILY
Qty: 385 G | Refills: 1 | Status: SHIPPED | OUTPATIENT
Start: 2024-10-03

## 2024-10-03 RX ORDER — PRENATAL VIT 91/IRON/FOLIC/DHA 28-975-200
COMBINATION PACKAGE (EA) ORAL AT BEDTIME
Qty: 42 G | Refills: 3 | Status: SHIPPED | OUTPATIENT
Start: 2024-10-03

## 2024-10-03 NOTE — PROGRESS NOTES
"  Assessment & Plan     Encounter to establish care  64-year-old female with history of hypertension, hyperlipidemia, bilateral knee arthritis, obesity presenting to establish care as her prior PCP has left the office.    Hyperlipidemia LDL goal <130  Due for lipids.  Continue atorvastatin.  - Lipid panel reflex to direct LDL Non-fasting; Future  - Lipid panel reflex to direct LDL Non-fasting    Benign essential hypertension  Blood pressure is not at goal less than 140/90.  She ran out of hydrochlorothiazide and has not been taking this medicine.  Will restart and continue metoprolol and lisinopril.  She will check her blood pressures at home after restarting hydrochlorothiazide and call to report her blood pressure to ensure medications are working.  - hydrochlorothiazide (HYDRODIURIL) 25 MG tablet; Take 1 tablet (25 mg) by mouth daily.    Leukocytosis, unspecified type  Noted on her last labs.  Recheck CBC today.  - CBC with platelets and differential; Future  - CBC with platelets and differential    Tinea corporis  Noted on exam.  She previously saw dermatology consultants and used Lamisil with good effect.  Restart.  - terbinafine (LAMISIL) 1 % external cream; Apply topically at bedtime.    Xerosis cutis  She also uses AmLactin for dry skin.  - ammonium lactate (AMLACTIN) 12 % external cream; Apply topically 2 times daily.    Need for vaccination  - INFLUENZA VACCINE TRIVALENT(FLUBLOK)  - COVID-19 12+ (PFIZER)    Primary osteoarthritis of both knees  She is following with sports medicine.  She had a hyaluronic acid injection a few weeks ago and feels that it is improving her knee pain.  She knows she can follow-up with them if pain does not continue to improve    At risk for dental problems  Provided with information on [facility] dental and her son will call.          BMI  Estimated body mass index is 35.78 kg/m  as calculated from the following:    Height as of this encounter: 1.49 m (4' 10.66\").    Weight as " of this encounter: 79.4 kg (175 lb 1.9 oz).   Weight management plan: Discussed healthy diet and exercise guidelines      Follow-up for annual preventive and hypertension    Subjective   Breana is a 64 year old, presenting for the following health issues:  Establish Care      10/3/2024     7:15 AM   Additional Questions   Roomed by joanna luong   Accompanied by Son Ese Deng, also speaks english         10/3/2024     7:15 AM    Services Provided    services provided? Yes   Language Jossy       History of Present Illness       Hypertension: She presents for follow up of hypertension.  She does check blood pressure  regularly outside of the clinic. Outpatient blood pressures have not been over 140/90. She does not follow a low salt diet.     Reason for visit:  Follow up    She eats 2-3 servings of fruits and vegetables daily.She consumes 2 sweetened beverage(s) daily.She exercises with enough effort to increase her heart rate 10 to 19 minutes per day.  She exercises with enough effort to increase her heart rate 4 days per week.   She is taking medications regularly.     Only taking 3 medications right now, that was all she got at the pharmacy  She does not take the medication for swelling anymore - did not receive at pharmacy, no side effects  No chest pain, no shortness of breath    Knee arthritis  - seeing sports medicine, getting hyaluronic acid injections  - knees a little bit better but cannot stand up for a long time  - uses walker  - not taking any meds for pain - just uses tylenol as needed    Teeth issues  - loose teeth on top and has been losing some    Reviewed medical history - HTN, HLD, knee OA    Lives with family and nephew    Rash  - got a cream for it before  - she was seen by dermatology 2022 and got creams for this which she uses and it helps            Review of Systems  Constitutional, HEENT, cardiovascular, pulmonary, gi and gu systems are negative, except as otherwise noted.     "  Objective    BP (!) 176/110   Pulse 104   Temp 99.1  F (37.3  C) (Oral)   Resp 16   Ht 1.49 m (4' 10.66\")   Wt 79.4 kg (175 lb 1.9 oz)   SpO2 97%   BMI 35.78 kg/m    Body mass index is 35.78 kg/m .  BP Readings from Last 3 Encounters:   10/03/24 (!) 169/100   03/12/24 124/74   02/27/24 (!) 139/93     Wt Readings from Last 3 Encounters:   10/03/24 79.4 kg (175 lb 1.9 oz)   09/26/24 77.6 kg (171 lb)   03/14/24 77.6 kg (171 lb)         Physical Exam   General: well-appearing, no acute distress  HEENT: normocephalic, atraumatic, mucous membranes moist, poor dentition with loose teeth on top  Eyes: conjunctivae normal  Neck: normal ROM, supple  Cardiovascular: regular rate and rhythm, normal S1 and S2, no murmurs/rubs/gallops  Pulmonary: no increased work of breathing, clear to auscultation bilaterally, no wheezes/rales/rhonchi  Musculoskeletal: normal ROM, no deformity, sitting in wheelchair  Neurological: gross motor exam normal  Skin: erythematous patches with central clearing on right lower leg, left medial ankle, and left arm              Signed Electronically by: Letitia Hope MD    "

## 2024-10-10 DIAGNOSIS — D72.828 NEUTROPHILIA: Primary | ICD-10-CM

## 2024-10-10 DIAGNOSIS — E78.5 HYPERLIPIDEMIA LDL GOAL <130: ICD-10-CM

## 2024-10-10 RX ORDER — ATORVASTATIN CALCIUM 80 MG/1
80 TABLET, FILM COATED ORAL DAILY
Qty: 90 TABLET | Refills: 3 | Status: SHIPPED | OUTPATIENT
Start: 2024-10-10

## 2024-10-14 ENCOUNTER — TELEPHONE (OUTPATIENT)
Dept: FAMILY MEDICINE | Facility: CLINIC | Age: 64
End: 2024-10-14
Payer: COMMERCIAL

## 2024-10-14 NOTE — TELEPHONE ENCOUNTER
[facility] on file). Relayed provider message/test results to [facility]. Lab only appt was scheduled for 10/16 for repeat labs per ATG.       Delfina Diaz, MSN, RN   [facility]

## 2024-10-14 NOTE — TELEPHONE ENCOUNTER
----- Message from Letitia Owens-Andrew sent at 10/10/2024  9:55 PM CDT -----  RN team - please call patient with results. White blood cell count is slightly high and I would like to do another lab test to look at this. Labs ordered - please assist in scheduling lab only appointment.  Cholesterol levels are better than before but still a little high. I would like for her to take a higher dose of atorvastatin, 80mg. I will send to pharmacy.

## 2024-10-16 ENCOUNTER — LAB (OUTPATIENT)
Dept: LAB | Facility: CLINIC | Age: 64
End: 2024-10-16
Payer: COMMERCIAL

## 2024-10-16 DIAGNOSIS — D72.828 NEUTROPHILIA: ICD-10-CM

## 2024-10-16 LAB
BASOPHILS # BLD AUTO: 0 10E3/UL (ref 0–0.2)
BASOPHILS NFR BLD AUTO: 0 %
EOSINOPHIL # BLD AUTO: 0.1 10E3/UL (ref 0–0.7)
EOSINOPHIL NFR BLD AUTO: 1 %
ERYTHROCYTE [DISTWIDTH] IN BLOOD BY AUTOMATED COUNT: 14.2 % (ref 10–15)
HCT VFR BLD AUTO: 37.5 % (ref 35–47)
HGB BLD-MCNC: 12.4 G/DL (ref 11.7–15.7)
IMM GRANULOCYTES # BLD: 0.1 10E3/UL
IMM GRANULOCYTES NFR BLD: 1 %
LYMPHOCYTES # BLD AUTO: 1.3 10E3/UL (ref 0.8–5.3)
LYMPHOCYTES NFR BLD AUTO: 10 %
MCH RBC QN AUTO: 28 PG (ref 26.5–33)
MCHC RBC AUTO-ENTMCNC: 33.1 G/DL (ref 31.5–36.5)
MCV RBC AUTO: 85 FL (ref 78–100)
MONOCYTES # BLD AUTO: 0.8 10E3/UL (ref 0–1.3)
MONOCYTES NFR BLD AUTO: 7 %
NEUTROPHILS # BLD AUTO: 10.4 10E3/UL (ref 1.6–8.3)
NEUTROPHILS NFR BLD AUTO: 82 %
PLATELET # BLD AUTO: 363 10E3/UL (ref 150–450)
RBC # BLD AUTO: 4.43 10E6/UL (ref 3.8–5.2)
RETICS # AUTO: 0.09 10E6/UL (ref 0.03–0.1)
RETICS/RBC NFR AUTO: 2.1 % (ref 0.5–2)
WBC # BLD AUTO: 12.8 10E3/UL (ref 4–11)

## 2024-10-16 PROCEDURE — 36415 COLL VENOUS BLD VENIPUNCTURE: CPT

## 2024-10-16 PROCEDURE — 99207 BLOOD MORPHOLOGY PATHOLOGIST REVIEW: CPT | Performed by: PATHOLOGY

## 2024-10-16 PROCEDURE — 85045 AUTOMATED RETICULOCYTE COUNT: CPT

## 2024-10-16 PROCEDURE — 85025 COMPLETE CBC W/AUTO DIFF WBC: CPT

## 2024-10-17 LAB
PATH REPORT.COMMENTS IMP SPEC: NORMAL
PATH REPORT.COMMENTS IMP SPEC: NORMAL
PATH REPORT.FINAL DX SPEC: NORMAL
PATH REPORT.MICROSCOPIC SPEC OTHER STN: NORMAL
PATH REPORT.MICROSCOPIC SPEC OTHER STN: NORMAL
PATH REPORT.RELEVANT HX SPEC: NORMAL

## 2024-10-21 ENCOUNTER — APPOINTMENT (OUTPATIENT)
Dept: INTERPRETER SERVICES | Facility: CLINIC | Age: 64
End: 2024-10-21
Payer: COMMERCIAL

## 2024-10-22 ENCOUNTER — TELEPHONE (OUTPATIENT)
Dept: FAMILY MEDICINE | Facility: CLINIC | Age: 64
End: 2024-10-22
Payer: COMMERCIAL

## 2025-01-27 ENCOUNTER — ANCILLARY PROCEDURE (OUTPATIENT)
Dept: GENERAL RADIOLOGY | Facility: CLINIC | Age: 65
End: 2025-01-27
Attending: FAMILY MEDICINE
Payer: COMMERCIAL

## 2025-01-27 ENCOUNTER — OFFICE VISIT (OUTPATIENT)
Dept: FAMILY MEDICINE | Facility: CLINIC | Age: 65
End: 2025-01-27
Payer: COMMERCIAL

## 2025-01-27 ENCOUNTER — NURSE TRIAGE (OUTPATIENT)
Dept: NURSING | Facility: CLINIC | Age: 65
End: 2025-01-27

## 2025-01-27 VITALS
OXYGEN SATURATION: 94 % | TEMPERATURE: 98.8 F | SYSTOLIC BLOOD PRESSURE: 152 MMHG | WEIGHT: 146.06 LBS | RESPIRATION RATE: 16 BRPM | DIASTOLIC BLOOD PRESSURE: 92 MMHG | BODY MASS INDEX: 29.44 KG/M2 | HEIGHT: 59 IN | HEART RATE: 106 BPM

## 2025-01-27 DIAGNOSIS — R63.4 WEIGHT LOSS: ICD-10-CM

## 2025-01-27 DIAGNOSIS — E11.65 TYPE 2 DIABETES MELLITUS WITH HYPERGLYCEMIA, WITHOUT LONG-TERM CURRENT USE OF INSULIN (H): ICD-10-CM

## 2025-01-27 DIAGNOSIS — R63.0 POOR APPETITE: Primary | ICD-10-CM

## 2025-01-27 DIAGNOSIS — R00.0 TACHYCARDIA: ICD-10-CM

## 2025-01-27 DIAGNOSIS — I10 BENIGN ESSENTIAL HYPERTENSION: ICD-10-CM

## 2025-01-27 LAB
ALBUMIN SERPL BCG-MCNC: 3.9 G/DL (ref 3.5–5.2)
ALBUMIN UR-MCNC: NEGATIVE MG/DL
ALP SERPL-CCNC: 119 U/L (ref 40–150)
ALT SERPL W P-5'-P-CCNC: 55 U/L (ref 0–50)
ANION GAP SERPL CALCULATED.3IONS-SCNC: 15 MMOL/L (ref 7–15)
APPEARANCE UR: CLEAR
AST SERPL W P-5'-P-CCNC: 61 U/L (ref 0–45)
BASOPHILS # BLD AUTO: 0 10E3/UL (ref 0–0.2)
BASOPHILS NFR BLD AUTO: 0 %
BILIRUB SERPL-MCNC: 0.6 MG/DL
BILIRUB UR QL STRIP: NEGATIVE
BUN SERPL-MCNC: 9.3 MG/DL (ref 8–23)
CALCIUM SERPL-MCNC: 9.8 MG/DL (ref 8.8–10.4)
CHLORIDE SERPL-SCNC: 94 MMOL/L (ref 98–107)
COLOR UR AUTO: YELLOW
CREAT SERPL-MCNC: 0.47 MG/DL (ref 0.51–0.95)
CRP SERPL-MCNC: 12.7 MG/L
EGFRCR SERPLBLD CKD-EPI 2021: >90 ML/MIN/1.73M2
EOSINOPHIL # BLD AUTO: 0.1 10E3/UL (ref 0–0.7)
EOSINOPHIL NFR BLD AUTO: 1 %
ERYTHROCYTE [DISTWIDTH] IN BLOOD BY AUTOMATED COUNT: 14.3 % (ref 10–15)
ERYTHROCYTE [SEDIMENTATION RATE] IN BLOOD BY WESTERGREN METHOD: 29 MM/HR (ref 0–30)
GLUCOSE SERPL-MCNC: 525 MG/DL (ref 70–99)
GLUCOSE UR STRIP-MCNC: >=1000 MG/DL
HCO3 SERPL-SCNC: 24 MMOL/L (ref 22–29)
HCT VFR BLD AUTO: 41.2 % (ref 35–47)
HGB BLD-MCNC: 13.9 G/DL (ref 11.7–15.7)
HGB UR QL STRIP: NEGATIVE
IMM GRANULOCYTES # BLD: 0.1 10E3/UL
IMM GRANULOCYTES NFR BLD: 1 %
KETONES UR STRIP-MCNC: 15 MG/DL
LEUKOCYTE ESTERASE UR QL STRIP: NEGATIVE
LYMPHOCYTES # BLD AUTO: 0.9 10E3/UL (ref 0.8–5.3)
LYMPHOCYTES NFR BLD AUTO: 10 %
MCH RBC QN AUTO: 27.5 PG (ref 26.5–33)
MCHC RBC AUTO-ENTMCNC: 33.7 G/DL (ref 31.5–36.5)
MCV RBC AUTO: 81 FL (ref 78–100)
MONOCYTES # BLD AUTO: 0.5 10E3/UL (ref 0–1.3)
MONOCYTES NFR BLD AUTO: 5 %
NEUTROPHILS # BLD AUTO: 8.2 10E3/UL (ref 1.6–8.3)
NEUTROPHILS NFR BLD AUTO: 84 %
NITRATE UR QL: NEGATIVE
PH UR STRIP: 6.5 [PH] (ref 5–7)
PLATELET # BLD AUTO: 382 10E3/UL (ref 150–450)
POTASSIUM SERPL-SCNC: 3.8 MMOL/L (ref 3.4–5.3)
PROT SERPL-MCNC: 7.4 G/DL (ref 6.4–8.3)
RBC # BLD AUTO: 5.06 10E6/UL (ref 3.8–5.2)
SODIUM SERPL-SCNC: 133 MMOL/L (ref 135–145)
SP GR UR STRIP: <=1.005 (ref 1–1.03)
TSH SERPL DL<=0.005 MIU/L-ACNC: 0.63 UIU/ML (ref 0.3–4.2)
UROBILINOGEN UR STRIP-ACNC: 0.2 E.U./DL
WBC # BLD AUTO: 9.8 10E3/UL (ref 4–11)

## 2025-01-27 PROCEDURE — 85025 COMPLETE CBC W/AUTO DIFF WBC: CPT | Performed by: FAMILY MEDICINE

## 2025-01-27 PROCEDURE — 83036 HEMOGLOBIN GLYCOSYLATED A1C: CPT | Performed by: FAMILY MEDICINE

## 2025-01-27 PROCEDURE — 81003 URINALYSIS AUTO W/O SCOPE: CPT | Performed by: FAMILY MEDICINE

## 2025-01-27 PROCEDURE — 80053 COMPREHEN METABOLIC PANEL: CPT | Performed by: FAMILY MEDICINE

## 2025-01-27 PROCEDURE — 99214 OFFICE O/P EST MOD 30 MIN: CPT | Performed by: FAMILY MEDICINE

## 2025-01-27 PROCEDURE — 85652 RBC SED RATE AUTOMATED: CPT | Performed by: FAMILY MEDICINE

## 2025-01-27 PROCEDURE — 71046 X-RAY EXAM CHEST 2 VIEWS: CPT | Mod: TC | Performed by: RADIOLOGY

## 2025-01-27 PROCEDURE — 84443 ASSAY THYROID STIM HORMONE: CPT | Performed by: FAMILY MEDICINE

## 2025-01-27 PROCEDURE — 36415 COLL VENOUS BLD VENIPUNCTURE: CPT | Performed by: FAMILY MEDICINE

## 2025-01-27 PROCEDURE — G2211 COMPLEX E/M VISIT ADD ON: HCPCS | Performed by: FAMILY MEDICINE

## 2025-01-27 PROCEDURE — 86140 C-REACTIVE PROTEIN: CPT | Performed by: FAMILY MEDICINE

## 2025-01-27 RX ORDER — LISINOPRIL 20 MG/1
20 TABLET ORAL DAILY
Qty: 90 TABLET | Refills: 0 | Status: SHIPPED | OUTPATIENT
Start: 2025-01-27

## 2025-01-27 ASSESSMENT — ENCOUNTER SYMPTOMS: FATIGUE: 1

## 2025-01-27 NOTE — PROGRESS NOTES
"  Assessment & Plan     Weight loss, poor appetite, altered sense of taste  Reports epigastric/chest burning, so reflux, gastritis, or ulcer could be cause. Labs and CXR as below and start omeprazole 20mg bid and follow up in one week. Depending on results, may need imaging (CT chest/abd/pelvis) to evaluate for malignancy given significant weight loss.   - CBC with platelets and differential  - TSH with free T4 reflex  - Comprehensive metabolic panel (BMP + Alb, Alk Phos, ALT, AST, Total. Bili, TP)  - ESR: Erythrocyte sedimentation rate  - CRP, inflammation  - Hemoglobin A1c  - UA Macroscopic with reflex to Microscopic and Culture - Lab Collect  - omeprazole (PRILOSEC) 20 MG DR capsule  Dispense: 60 capsule; Refill: 1  - XR Chest 2 Views    Benign essential hypertension  Reporting low BP mid-day at home on lisinopril 40mg and hydrochlorothiazide 25mg. May have lower antihypertensive need after significant weight loss. Stopped all meds a few days ago and BP only mildly elevated today, so will discontinue hydrochlorothiazide and decrease lisinopril dose to 20mg daily with close follow up.   - lisinopril (ZESTRIL) 20 MG tablet  Dispense: 90 tablet; Refill: 0    Tachycardia  Chronic - similar HR at all but 1 visit going back to 2022. No cardiac symptoms today. HR regular by auscultation today. Consider further evaluation at next visit w EKG and echo order.     Call niece   If no answer son (first number on contacts)              Subjective   Naw is a 65 year old, presenting for the following health issues:  Anorexia and Fatigue        1/27/2025    11:24 AM   Additional Questions   Roomed by Nikki PANDYA   Accompanied by daughter Tl     No appetite, everything tastes bland. Bad taste in mouth. Saliva is \"slimy.\" Dizziness. Feeling this way for almost a month.     Denies abdominal pain but reports burning from upper abdomen into chest. Gets better if she eats, but not eating often. Daughter makes her rice " "porridge and if she eats that, pain resolves. Normal BMs, no diarrhea or constipation. No blood in stool. No bloating. Normal UO.     Weight loss- appears to have lost 25-30 pounds since Oct per chart.     Breathing is normal. No cough.    Brings med bottles: hydrochlorothiazide 25mg, lisinopril 40mg, and atorvastatin 40mg. Does not have metoprolol. Last filled late Oct 30 tabs - suspect not taking.     States BP was going up and down, so they stopped all of her medications 2-3 days ago. Before stopping, Mornings BP ~150 systolic, mid-day 118 or as low as 98, in evenings 145 systolic.  Was taking all 3 meds in the morning.    Ever since atorvastatin was increased to 80mg (early October) she started having poor appetite, fatigue, dizziness, weight loss. PCP decreased dose at her request back to 40mg in late November.    Had \"the flu\" December but completely recovered    Rash - not new. Not itchy. Has a cream to use, not did use recently. It gets better with cream, but does not like to apply it. Per past notes, derm consultants treated w topical antifungal.                   Objective    BP (!) 152/92   Pulse (!) 106   Temp 98.8  F (37.1  C) (Oral)   Resp 16   Ht 1.49 m (4' 10.66\")   Wt 66.3 kg (146 lb 1 oz)   SpO2 94%   BMI 29.84 kg/m    Body mass index is 29.84 kg/m .  Physical Exam   GENERAL: alert and no distress  EYES: Eyes grossly normal to inspection, PERRL and conjunctivae and sclerae normal  HENT: mouth without ulcers or lesions  NECK: no adenopathy  RESP: lungs clear to auscultation - no rales, rhonchi or wheezes  CV: regular rate and rhythm, normal S1 S2, no S3 or S4, no murmur, click or rub, no peripheral edema  ABDOMEN: soft, nontender   MS: no gross musculoskeletal defects noted, no edema  Skin: rash bilateral upper extremities - serpiginous erythematous border, central clearing, scale.             Signed Electronically by: Neeta Quinones MD    "

## 2025-01-28 ENCOUNTER — OFFICE VISIT (OUTPATIENT)
Dept: PEDIATRICS | Facility: CLINIC | Age: 65
End: 2025-01-28
Payer: COMMERCIAL

## 2025-01-28 ENCOUNTER — HOSPITAL ENCOUNTER (OUTPATIENT)
Dept: GENERAL RADIOLOGY | Facility: HOSPITAL | Age: 65
Discharge: HOME | End: 2025-01-28
Attending: EMERGENCY MEDICINE
Payer: COMMERCIAL

## 2025-01-28 VITALS
RESPIRATION RATE: 16 BRPM | DIASTOLIC BLOOD PRESSURE: 98 MMHG | TEMPERATURE: 99.2 F | HEART RATE: 95 BPM | SYSTOLIC BLOOD PRESSURE: 165 MMHG | OXYGEN SATURATION: 97 %

## 2025-01-28 DIAGNOSIS — E11.9 NEW ONSET TYPE 2 DIABETES MELLITUS (H): Primary | ICD-10-CM

## 2025-01-28 DIAGNOSIS — R73.9 HYPERGLYCEMIA: ICD-10-CM

## 2025-01-28 LAB
ANION GAP SERPL CALCULATED.3IONS-SCNC: 8 MMOL/L (ref 3–14)
BUN SERPL-MCNC: 9 MG/DL (ref 7–30)
CALCIUM SERPL-MCNC: 9.2 MG/DL (ref 8.5–10.1)
CHLORIDE BLD-SCNC: 100 MMOL/L (ref 94–109)
CO2 SERPL-SCNC: 27 MMOL/L (ref 20–32)
CREAT SERPL-MCNC: 0.5 MG/DL (ref 0.52–1.04)
EGFRCR SERPLBLD CKD-EPI 2021: >90 ML/MIN/1.73M2
EST. AVERAGE GLUCOSE BLD GHB EST-MCNC: 372 MG/DL
FLUAV RNA SPEC QL NAA+PROBE: NEGATIVE
FLUBV RNA RESP QL NAA+PROBE: NEGATIVE
GLUCOSE BLD-MCNC: 426 MG/DL (ref 70–99)
HBA1C MFR BLD: 14.6 % (ref 0–5.6)
POTASSIUM BLD-SCNC: 3.6 MMOL/L (ref 3.4–5.3)
RSV RNA SPEC NAA+PROBE: NEGATIVE
SARS-COV-2 RNA RESP QL NAA+PROBE: NEGATIVE
SODIUM SERPL-SCNC: 135 MMOL/L (ref 135–145)
TROPONIN T SERPL HS-MCNC: 15 NG/L

## 2025-01-28 PROCEDURE — 99214 OFFICE O/P EST MOD 30 MIN: CPT | Performed by: EMERGENCY MEDICINE

## 2025-01-28 PROCEDURE — 93005 ELECTROCARDIOGRAM TRACING: CPT | Performed by: EMERGENCY MEDICINE

## 2025-01-28 PROCEDURE — 80048 BASIC METABOLIC PNL TOTAL CA: CPT | Performed by: EMERGENCY MEDICINE

## 2025-01-28 PROCEDURE — 87637 SARSCOV2&INF A&B&RSV AMP PRB: CPT | Performed by: EMERGENCY MEDICINE

## 2025-01-28 PROCEDURE — 93010 ELECTROCARDIOGRAM REPORT: CPT | Performed by: STUDENT IN AN ORGANIZED HEALTH CARE EDUCATION/TRAINING PROGRAM

## 2025-01-28 PROCEDURE — 36415 COLL VENOUS BLD VENIPUNCTURE: CPT | Performed by: EMERGENCY MEDICINE

## 2025-01-28 PROCEDURE — 84484 ASSAY OF TROPONIN QUANT: CPT | Performed by: EMERGENCY MEDICINE

## 2025-01-28 PROCEDURE — 71046 X-RAY EXAM CHEST 2 VIEWS: CPT

## 2025-01-28 RX ORDER — METFORMIN HYDROCHLORIDE 500 MG/1
TABLET, EXTENDED RELEASE ORAL
Qty: 67 TABLET | Refills: 0 | Status: SHIPPED | OUTPATIENT
Start: 2025-01-28 | End: 2025-03-06

## 2025-01-28 RX ORDER — GLIPIZIDE 2.5 MG/1
2.5 TABLET, EXTENDED RELEASE ORAL DAILY
Qty: 30 TABLET | Refills: 0 | Status: SHIPPED | OUTPATIENT
Start: 2025-01-28

## 2025-01-28 RX ORDER — HYDROCHLOROTHIAZIDE 25 MG/1
25 TABLET ORAL DAILY
COMMUNITY

## 2025-01-28 ASSESSMENT — PAIN SCALES - GENERAL: PAINLEVEL_OUTOF10: SEVERE PAIN (10)

## 2025-01-28 NOTE — TELEPHONE ENCOUNTER
----- Message from Neetagurjit Quinones sent at 1/28/2025  4:00 PM CST -----  Patient was seen today at [facility] for new onset DM2. This explains her weight loss and not feeling well. I see that they prescribed metformin today. I recommend she also start glipizide one pill daily with breakfast while she is waiting to get in with MTM or endocrinology to discuss further medication options, which would most likely include insulin. MTM referral placed.       Called kenyon Salazar (CTC on file) with assistance of an , April, ID# [id number] to relay provider test result and recommendations above. Pharmacy medications sent to provided.  Advised to reach back to clinic if not heard from referrals by end of this week to early next week so staff can assist with follow-up.    Skip Smith RN  [facility]

## 2025-01-28 NOTE — TELEPHONE ENCOUNTER
Will route encounter to Dr. Morales to review and advice.  A1C ordered but not collected.  Lab notified.      Skip Smith RN  [facility]

## 2025-01-28 NOTE — TELEPHONE ENCOUNTER
"Dr. Owens, please advise.    Nurse Triage SBAR    Is this a 2nd Level Triage? YES, LICENSED PRACTITIONER REVIEW IS REQUIRED    Situation: Pt calling back after trying to contact regarding critical glucose 1/27    Background: critical glucose 1/27 - 545    Assessment: Pt A&O with triage via  - Pt reports having new dizziness, decreased appetite, and sudden BP changes (BP this morning per pt 130/80); pt denies having any confusion, new weakness, no vomiting;  confirmed pt sounds 'normal' regarding speech; pt has no meter at home and does not know her BG today    Protocol Recommended Disposition:   No disposition on file.    Recommendation: Pt requesting call back with  ASAP, with guidance      Routed to provider    Does the patient meet one of the following criteria for [facility] visit consideration? 16+ years old, with an [facility] PCP     TIP  Providers, please consider if this condition is appropriate for management at one of our Acute and Diagnostic Services sites.     If patient is a good candidate, please use dotphrase <dot>triageresponse and select Refer to [facility] to document.    Za Couch RN    ~~~~~~~~~~~~    1. BLOOD GLUCOSE: \"What is your blood glucose level?\"       Unknown - no meter at home  2. ONSET: \"When did you check the blood glucose?\"      Critical lab result 1/27  3. USUAL RANGE: \"What is your glucose level usually?\" (e.g., usual fasting morning value, usual evening value)      Unknown  4. KETONES: \"Do you check for ketones (urine or blood test strips)?\" If Yes, ask: \"What does the test show now?\"       Uknown  5. TYPE 1 or 2:  \"Do you know what type of diabetes you have?\"  (e.g., Type 1, Type 2, Gestational; doesn't know)       No Dx  6. INSULIN: \"Do you take insulin?\" \"What type of insulin(s) do you use? What is the mode of delivery? (syringe, pen; injection or pump)?\"       No medications at home for DM  7. DIABETES PILLS: \"Do you take any pills for your " "diabetes?\" If Yes, ask: \"Have you missed taking any pills recently?\"      No medications at home for DM  8. OTHER SYMPTOMS: \"Do you have any symptoms?\" (e.g., fever, frequent urination, difficulty breathing, dizziness, weakness, vomiting)      Weakness in legs (chronic), new dizziness, BP changes - 130/80, decreased appetite    Additional Information   Negative: Unconscious or difficult to awaken   Negative: Acting confused (e.g., disoriented, slurred speech)   Negative: Very weak (can't stand)   Negative: Sounds like a life-threatening emergency to the triager   Negative: Vomiting and signs of dehydration (e.g., very dry mouth, lightheaded, dark urine)   Negative: Blood glucose > 240 mg/dL (13.3 mmol/L) and rapid breathing   Negative: Vomiting lasting > 4 hours   Negative: Blood glucose > 240 mg/dL (13.3 mmol/L) AND urine ketones moderate-large (or more than 1+)   Negative: Blood glucose > 240 mg/dL (13.3 mmol/L) and blood ketones > 1.4 mmol/L   Negative: Blood glucose > 240 mg/dL (13.3 mmol/L) AND vomiting AND unable to check for ketones (in blood or urine)   Negative: Patient sounds very sick or weak to the triager   Negative: Blood glucose > 500 mg/dL (27.8 mmol/L)    Protocols used: Diabetes - High Blood Sugar-A-OH    "

## 2025-01-28 NOTE — TELEPHONE ENCOUNTER
Reviewed - patient could be a candidate for [facility] evaluation for hyperglycemia. Can you please call [facility] and see if they would accept?    Clinic RN contacted [facility] and spoke to JACKIE Blount of the verbal referral. Patient can be seen by [facility].  [facility] staff to contact patient with an .

## 2025-01-28 NOTE — PROGRESS NOTES
Acute and Diagnostic Services Clinic Visit        Subjective   Breana is a 65 year old, presenting for the following health issues:        Objective    BP (!) 165/98 (BP Location: Left arm, Patient Position: Sitting, Cuff Size: Adult Regular)   Pulse 95   Temp 99.2  F (37.3  C) (Oral)   Resp 16   SpO2 97%   There is no height or weight on file to calculate BMI.          [facility] PROVIDER NOTE  [facility]    History     Chief Complaint   Patient presents with    Hyperglycemia     HPI  Breana Conner is a 65 year old female who was recently found to have a blood sugar in the 500s in clinic.  The patient's hemoglobin A1c was 14 and she was subsequently referred to the [facility] for further diabetic management and initiation of treatment.  Patient denies any chest pain any coughing any shortness of breath any vomiting or diarrhea.    Patient's labs from a clinic done yesterday revealed a urine that reveals no evidence of infection but ketones to be present.  Patient's hemoglobin A1c was 14.6.  Patient's comprehensive panel revealed normal renal function with a glucose of 525.  Patient CBC revealed a white count of 9.8 and a hemoglobin of 13.9.  TSH was 0.63      I have reviewed the Medications, Allergies, Past Medical and Surgical History, and Social History in the Epic system.      No past medical history on file.    No past surgical history on file.        Dose / Directions   ammonium lactate 12 % external cream  Commonly known as: AMLACTIN  Used for: Xerosis cutis      Apply topically 2 times daily.  Quantity: 385 g  Refills: 1     atorvastatin 40 MG tablet  Commonly known as: LIPITOR  Used for: Hyperlipidemia LDL goal <130      Dose: 40 mg  Take 1 tablet (40 mg) by mouth daily.  Quantity: 90 tablet  Refills: 1     hydrochlorothiazide 25 MG tablet  Commonly known as: HYDRODIURIL      Dose: 25 mg  Take 25 mg by mouth daily.  Refills: 0     lisinopril 20 MG tablet  Commonly known as: ZESTRIL  Used for: Benign  essential hypertension      Dose: 20 mg  Take 1 tablet (20 mg) by mouth daily.  Quantity: 90 tablet  Refills: 0     metoprolol succinate ER 25 MG 24 hr tablet  Commonly known as: TOPROL XL  Used for: Benign essential hypertension, Tachycardia      Dose: 25 mg  Take 1 tablet (25 mg) by mouth daily  Quantity: 90 tablet  Refills: 3     omeprazole 20 MG DR capsule  Commonly known as: PriLOSEC  Used for: Poor appetite, Weight loss      Dose: 20 mg  Take 1 capsule (20 mg) by mouth 2 times daily.  Quantity: 60 capsule  Refills: 1     terbinafine 1 % external cream  Commonly known as: lamISIL  Used for: Tinea corporis      Apply topically at bedtime.  Quantity: 42 g  Refills: 3              Past medical history, past surgical history, medications, and allergies were reviewed with the patient. Additional pertinent items: None    Family History   Problem Relation Age of Onset    Breast Cancer No family hx of     Ovarian Cancer No family hx of        Social History     Tobacco Use    Smoking status: Never     Passive exposure: Never    Smokeless tobacco: Never   Substance Use Topics    Alcohol use: No     Social history was reviewed with the patient. Additional pertinent items: None    Allergies   Allergen Reactions    Beef-Derived Drug Products Itching    Other Food Allergy Itching     Fish paste       Review of Systems  A medically appropriate review of systems was performed with pertinent positives and negatives noted in the HPI, and all other systems negative.    Physical Exam   BP: (!) 165/98  Pulse: 95  Temp: 99.2  F (37.3  C)  Resp: 16  SpO2: 97 %      Physical Exam  Vitals and nursing note reviewed.   Constitutional:       Appearance: She is obese. She is not ill-appearing.   HENT:      Head: Atraumatic.   Eyes:      Extraocular Movements: Extraocular movements intact.      Pupils: Pupils are equal, round, and reactive to light.   Pulmonary:      Effort: No respiratory distress.   Musculoskeletal:         General: No  deformity.      Cervical back: Neck supple.   Neurological:      General: No focal deficit present.      Mental Status: She is alert.   Psychiatric:         Mood and Affect: Mood normal.         ADS Course     Orders Placed This Encounter   Procedures    XR Chest 2 Views    Basic metabolic panel    Troponin T, High Sensitivity    Influenza A/B, RSV and SARS-CoV2 PCR (COVID-19)    Adult Diabetes Education  Referral    EKG 12-lead, tracing only         Procedures         Patient is EKG revealed a normal sinus rhythm at a rate of 86 with a IN interval of 0.152 and a QRS duration of 0.092 with a normal axis and no acute ST or T wave changes significant for ischemia.  As read by me personally.    EXAM: XR CHEST 2 VIEWS  LOCATION: [facility]  DATE: 1/28/2025     INDICATION:  Hyperglycemia  COMPARISON: 1/27/2025                                                                      IMPRESSION: Negative chest.        Results for orders placed or performed in visit on 01/28/25 (from the past 24 hours)   Troponin T, High Sensitivity   Result Value Ref Range    Troponin T, High Sensitivity 15 (H) <=14 ng/L   Basic metabolic panel   Result Value Ref Range    Sodium 135 135 - 145 mmol/L    Potassium 3.6 3.4 - 5.3 mmol/L    Chloride 100 94 - 109 mmol/L    Carbon Dioxide (CO2) 27 20 - 32 mmol/L    Anion Gap 8 3 - 14 mmol/L    Urea Nitrogen 9 7 - 30 mg/dL    Creatinine 0.50 (L) 0.52 - 1.04 mg/dL    GFR Estimate >90 >60 mL/min/1.73m2    Calcium 9.2 8.5 - 10.1 mg/dL    Glucose 426 (H) 70 - 99 mg/dL   EKG 12-lead, tracing only   Result Value Ref Range    Systolic Blood Pressure  mmHg    Diastolic Blood Pressure  mmHg    Ventricular Rate 86 BPM    Atrial Rate 86 BPM    IN Interval 170 ms    QRS Duration 74 ms     ms    QTc 447 ms    P Axis 40 degrees    R AXIS 18 degrees    T Axis 59 degrees    Interpretation ECG       Sinus rhythm  Normal ECG  No previous ECGs available         Critical care was  not performed.     Medical Decision Making  The patient's presentation was of moderate complexity (an acute illness with systemic symptoms).        Assessments & Plan (with Medical Decision Making)     I have reviewed the nursing notes.    Patient was noted to have a low-grade fever with yesterday an O2 sat of 94% but here 97% on room air.  The patient's evaluation entailed an investigation to see why the patient may be hyperglycemic at this time and infectious etiologies were explored as well as cardiac however this was all negative so far with the exception of her COVID influenza swab not reporting yet.  With the patient's hemoglobin A1c and her glucose in the 400s but no anion gap, the patient will be started on metformin.    I have reviewed the findings, diagnosis, plan and need for follow up with the patient.    TOTAL PATIENT CARE TIME INCLUDING DOCUMENTATION, FAMILY COMMUNICATION AND CARE COORDINATION WAS 30 MINUTES.     New Prescriptions    METFORMIN (GLUCOPHAGE XR) 500 MG 24 HR TABLET    Take 1 tablet (500 mg) by mouth daily for 7 days, THEN 1 tablet (500 mg) 2 times daily (with meals).       Final diagnoses:   New onset type 2 diabetes mellitus (H)     You have new onset adult diabetes    Please fill your prescription and take as directed    A referral to the diabetic clinic and educators was placed in the computer system.  They should call you in the next 48 hours to help you set up an appointment to be seen sometime in the next 1 to 2 weeks.  If they do not call you please call them at [phone].        SHARMIN MONTANO MD    1/28/2025   [facility]

## 2025-01-28 NOTE — PATIENT INSTRUCTIONS
You have new onset adult diabetes    Please fill your prescription and take as directed    A referral to the diabetic clinic and educators was placed in the computer system.  They should call you in the next 48 hours to help you set up an appointment to be seen sometime in the next 1 to 2 weeks.  If they do not call you please call them at [phone].

## 2025-01-28 NOTE — TELEPHONE ENCOUNTER
Zane with [facility] is calling with a critical lab result.      DATE/TIME OF CALL RECEIVED FROM LAB:  01/27/25 at 6:44 PM   LAB TEST:  Glucose  LAB VALUE:  525  PROVIDER NOTIFIED?: Yes  PROVIDER NAME: Arminda Chavez Winnett, Mary  DATE/TIME LAB VALUE REPORTED TO PROVIDER: 1/27/2025 6:52 pm.  Repaged at 7:10 to back up providor Sameera Hummel.  MECHANISM OF PROVIDER NOTIFICATION: Page  PROVIDER RESPONSE: Phone patient and if not worse or better to contact pcp tomorrow.  MD advised FNA to send message to both pcp/clinic and ordering provider.  FNA phone [facility] phone number on chart and relayed information to Tl Deras and kenyon is going to phone Breana Conner as FNA tried to phone patient and there was no answer.  Kenyon will also have patient phone clinic/pcp in am.

## 2025-01-29 LAB
ATRIAL RATE - MUSE: 86 BPM
DIASTOLIC BLOOD PRESSURE - MUSE: NORMAL MMHG
INTERPRETATION ECG - MUSE: NORMAL
P AXIS - MUSE: 40 DEGREES
PR INTERVAL - MUSE: 170 MS
QRS DURATION - MUSE: 74 MS
QT - MUSE: 374 MS
QTC - MUSE: 447 MS
R AXIS - MUSE: 18 DEGREES
SYSTOLIC BLOOD PRESSURE - MUSE: NORMAL MMHG
T AXIS - MUSE: 59 DEGREES
VENTRICULAR RATE- MUSE: 86 BPM

## 2025-02-03 ENCOUNTER — VIRTUAL VISIT (OUTPATIENT)
Dept: PHARMACY | Facility: CLINIC | Age: 65
End: 2025-02-03
Attending: FAMILY MEDICINE
Payer: COMMERCIAL

## 2025-02-03 DIAGNOSIS — K21.00 GASTROESOPHAGEAL REFLUX DISEASE WITH ESOPHAGITIS WITHOUT HEMORRHAGE: ICD-10-CM

## 2025-02-03 DIAGNOSIS — E11.9 TYPE 2 DIABETES MELLITUS WITHOUT COMPLICATION, WITHOUT LONG-TERM CURRENT USE OF INSULIN (H): Primary | ICD-10-CM

## 2025-02-03 DIAGNOSIS — I10 BENIGN ESSENTIAL HYPERTENSION: ICD-10-CM

## 2025-02-03 DIAGNOSIS — R21 RASH: ICD-10-CM

## 2025-02-03 DIAGNOSIS — E78.5 HYPERLIPIDEMIA LDL GOAL <130: ICD-10-CM

## 2025-02-03 DIAGNOSIS — L29.9 ITCHING: ICD-10-CM

## 2025-02-03 PROCEDURE — 99607 MTMS BY PHARM ADDL 15 MIN: CPT | Mod: 93

## 2025-02-03 PROCEDURE — 99605 MTMS BY PHARM NP 15 MIN: CPT | Mod: 93

## 2025-02-03 RX ORDER — GLIPIZIDE 5 MG/1
5 TABLET, FILM COATED, EXTENDED RELEASE ORAL 2 TIMES DAILY
Qty: 180 TABLET | Refills: 3 | Status: SHIPPED | OUTPATIENT
Start: 2025-02-03

## 2025-02-03 RX ORDER — METFORMIN HYDROCHLORIDE 500 MG/1
TABLET, EXTENDED RELEASE ORAL
Qty: 360 TABLET | Refills: 4 | Status: SHIPPED | OUTPATIENT
Start: 2025-02-03

## 2025-02-03 NOTE — PROGRESS NOTES
Medication Therapy Management (MTM) Encounter    ASSESSMENT:                            Medication Adherence/Access: Patient's son was helping patient identify her medications.     Diabetes Type II: A1C well above goal of < 7%. Per patient sugar has been high and reasonable to optimize medications. Patient could benefit from GLP-1 agonists, but decline due to fear of injections. Will consider increasing metformin and glipizide dose for now. Advised patient to eat healthy; sugar is likely high due to diet and sweet beverages. Will follow up closely.     Hypertension: In clinic BP above goal of < 130/80. However, per patient BP has been much better at home. Patient noted she has white coat syndrome. Will consider adjusting medications after patient provides BP readings. Will follow up closely.    Hyperlipidemia: Stable on atorvastatin 40 mg daily     Rash/Itching:Stable on current medications     GERD:Stable on omeprazole      PLAN:                            Increase metformin  from 1 table daily to 1 tablet twice daily   Increase glipizide XL 5 mg twice daily   Please eat healthy: more veggies and proteins and less carbohydrates (less rice)  Please check your blood pressure at least three times a week    Follow-up: Due on 03/03/2025 @ 2:30 PM    SUBJECTIVE/OBJECTIVE:                          Breana Conner is a 65 year old female seen for an initial visit. She was referred to me from PCP. Jossy interpretor (Name: Bossman ID [id number]). Heh (son)    Reason for visit: Diabetes Type II.    Allergies/ADRs: Reviewed in chart  Past Medical History: Reviewed in chart  Tobacco: She reports that she has never smoked. She has never been exposed to tobacco smoke. She has never used smokeless tobacco.      Medication Adherence/Access: Patient's son was helping patient identify her medications.     Diabetes  Type II:   Metformin  mg 2 tablets twice daily    Glipizide XL 2.5 mg daily   Not taking aspirin due to unsure   Patient  is not experiencing side effects.  Current diabetes symptoms: none  Diet/Exercise: Breakfast: Rice and soup  lunch: The same (rice and dinner)   Dinner: the same (rice (white) and soup). Last week she was drinking sweet drink and that might have increased her sugar. Does not like brown rice and likes the white rice.      Blood sugar monitorin time(s) daily; Ranges: (patient reported) Fasting- 140. Her sugar has been really high before in the 500s, but now it is in 200 and 250s.   Eye exam in the last 12 months? No  Foot exam: due    Lab Results   Component Value Date    A1C 14.6 2025     Hypertension    Hydrochlorothiazide 25 mg daily    Lisinopril 20 mg daily    Metoprolol succinate XL 25 mg daily   Patient reports no current medication side effects  Patient self monitors blood pressure.  Home BP monitoring sometimes and noted it is lower than that she gets at the clinic  .    BP Readings from Last 3 Encounters:   25 (!) 165/98   25 (!) 152/92   10/03/24 (!) 169/100     Patient noted her blood pressure was high due to being nervous at the clinic.  BP: 130/80s       Hyperlipidemia    Atorvastatin 40 mg daily   Patient reports no significant myalgias or other side effects.  The 10-year ASCVD risk score (Nilam LUCERO, et al., 2019) is: 23%    Values used to calculate the score:      Age: 65 years      Sex: Female      Is Non- : No      Diabetic: Yes      Tobacco smoker: No      Systolic Blood Pressure: 165 mmHg      Is BP treated: Yes      HDL Cholesterol: 47 mg/dL      Total Cholesterol: 227 mg/dL       Rash/Itching:   Ammonium lactate 12% external cream 2 times daily    Terbinafine 1% external cream and takes at bedtime   Patient noted these medications are helping     GERD     Omeprazole 20 mg twice daily  Patient feels that current regimen is effective.  The patient does not have a history of GI bleed.  The patient does notice symptoms if they miss a dose.  Patient has  not tried a trial off of therapy and is not interested in doing so.         Today's Vitals: There were no vitals taken for this visit.  ----------------      I spent 32 minutes with this patient today. All changes were made via collaborative practice agreement with Letitia Hope MD.     A summary of these recommendations was sent via [employer].      Telemedicine Visit Details  The patient's medications can be safely assessed via a telemedicine encounter.  Type of service:  Telephone visit  Originating Location (pt. Location): Home    Distant Location (provider location):  On-site  Start Time:  3:00 PM  End Time:   3:32 PM      Medication Therapy Recommendations  Type 2 diabetes mellitus without complication, without long-term current use of insulin (H)   1 Current Medication: glipiZIDE (GLUCOTROL XL) 2.5 MG 24 hr tablet (Discontinued)   Current Medication Sig: Take 1 tablet (2.5 mg) by mouth daily.   Rationale: Dose too low - Dosage too low - Effectiveness   Recommendation: Increase Dose - glipiZIDE 5 MG 24 hr tablet   Status: Accepted per CPA   Identified Date: 2/3/2025 Completed Date: 2/3/2025         2 Current Medication: metFORMIN (GLUCOPHAGE XR) 500 MG 24 hr tablet   Current Medication Sig: Start taking 1 tablet once daily for a week, then increase it to one tablet twice a day for another week, then increase it to 2 tablet in the morning and 1 tablet in the afternoon for another week, then take 2 tablets by mouth twice a day for maintenance.   Rationale: Dose too low - Dosage too low - Effectiveness   Recommendation: Increase Dose - Increase metformin XR 500mg dose from one tablet daily to twice daily   Status: Accepted per CPA   Identified Date: 2/3/2025 Completed Date: 2/3/2025              Livia Moffett, PharmD     Medication Therapy Management (MTM) Pharmacist     [phone]     [email or web address]

## 2025-02-03 NOTE — PATIENT INSTRUCTIONS
"Recommendations from today's MTM visit:                                                      MTM (medication therapy management) is a service provided by a clinical pharmacist designed to help you get the most of out of your medicines.   Today we reviewed what your medicines are for, how to know if they are working, that your medicines are safe and how to make your medicine regimen as easy as possible.      Increase metformin  from 1 table daily to 1 tablet twice daily   Increase glipizide XL 5 mg twice daily   Please eat healthy: more veggies and proteins and less carbohydrates (less rice)  Please check your blood pressure at least three times a week    Follow-up: Due on 03/03/2025 @ 2:30 PM    It was great speaking with you today.  I value your experience and would be very thankful for your time in providing feedback in our clinic survey. In the next few days, you may receive an email or text message from [employer] with a link to a survey related to your  clinical pharmacist.\"     To schedule another MTM appointment, please call the clinic directly or you may call the MTM scheduling line at [phone].    My Clinical Pharmacist's contact information:                                                      Please feel free to contact me with any questions or concerns you have.        Livia Moffett, PharmD     Medication Therapy Management (MTM) Pharmacist     [phone]     [email or web address]     [facility]    "

## 2025-02-13 ENCOUNTER — VIRTUAL VISIT (OUTPATIENT)
Dept: INTERPRETER SERVICES | Facility: CLINIC | Age: 65
End: 2025-02-13

## 2025-03-04 ENCOUNTER — OFFICE VISIT (OUTPATIENT)
Dept: FAMILY MEDICINE | Facility: CLINIC | Age: 65
End: 2025-03-04
Payer: COMMERCIAL

## 2025-03-04 ENCOUNTER — PATIENT OUTREACH (OUTPATIENT)
Dept: CARE COORDINATION | Facility: CLINIC | Age: 65
End: 2025-03-04

## 2025-03-04 ENCOUNTER — ORDERS ONLY (AUTO-RELEASED) (OUTPATIENT)
Dept: FAMILY MEDICINE | Facility: CLINIC | Age: 65
End: 2025-03-04

## 2025-03-04 VITALS
WEIGHT: 146.08 LBS | DIASTOLIC BLOOD PRESSURE: 98 MMHG | HEIGHT: 59 IN | BODY MASS INDEX: 29.45 KG/M2 | RESPIRATION RATE: 20 BRPM | SYSTOLIC BLOOD PRESSURE: 188 MMHG | HEART RATE: 87 BPM | OXYGEN SATURATION: 96 % | TEMPERATURE: 98.7 F

## 2025-03-04 DIAGNOSIS — Z23 NEED FOR VACCINATION: ICD-10-CM

## 2025-03-04 DIAGNOSIS — Z12.31 VISIT FOR SCREENING MAMMOGRAM: ICD-10-CM

## 2025-03-04 DIAGNOSIS — Z12.11 SCREENING FOR COLON CANCER: ICD-10-CM

## 2025-03-04 DIAGNOSIS — B35.4 TINEA CORPORIS: ICD-10-CM

## 2025-03-04 DIAGNOSIS — E11.65 TYPE 2 DIABETES MELLITUS WITH HYPERGLYCEMIA, WITHOUT LONG-TERM CURRENT USE OF INSULIN (H): ICD-10-CM

## 2025-03-04 DIAGNOSIS — I10 BENIGN ESSENTIAL HYPERTENSION: ICD-10-CM

## 2025-03-04 DIAGNOSIS — R26.89 IMPAIRED GAIT AND MOBILITY: ICD-10-CM

## 2025-03-04 DIAGNOSIS — M17.0 PRIMARY OSTEOARTHRITIS OF BOTH KNEES: ICD-10-CM

## 2025-03-04 DIAGNOSIS — Z00.00 ROUTINE GENERAL MEDICAL EXAMINATION AT A HEALTH CARE FACILITY: Primary | ICD-10-CM

## 2025-03-04 PROBLEM — E66.01 MORBID OBESITY (H): Status: RESOLVED | Noted: 2021-09-29 | Resolved: 2025-03-04

## 2025-03-04 LAB
CREAT UR-MCNC: 53.6 MG/DL
MICROALBUMIN UR-MCNC: 26.7 MG/L
MICROALBUMIN/CREAT UR: 49.81 MG/G CR (ref 0–25)

## 2025-03-04 PROCEDURE — 82570 ASSAY OF URINE CREATININE: CPT | Performed by: FAMILY MEDICINE

## 2025-03-04 PROCEDURE — 82043 UR ALBUMIN QUANTITATIVE: CPT | Performed by: FAMILY MEDICINE

## 2025-03-04 RX ORDER — METFORMIN HYDROCHLORIDE 500 MG/1
500 TABLET, EXTENDED RELEASE ORAL 2 TIMES DAILY WITH MEALS
Qty: 180 TABLET | Refills: 3 | Status: SHIPPED | OUTPATIENT
Start: 2025-03-04

## 2025-03-04 RX ORDER — ACETAMINOPHEN 500 MG
500-1000 TABLET ORAL EVERY 6 HOURS PRN
Qty: 100 TABLET | Refills: 1 | Status: SHIPPED | OUTPATIENT
Start: 2025-03-04

## 2025-03-04 RX ORDER — FLUCONAZOLE 150 MG/1
150 TABLET ORAL WEEKLY
Qty: 4 TABLET | Refills: 0 | Status: SHIPPED | OUTPATIENT
Start: 2025-03-04

## 2025-03-04 RX ORDER — LISINOPRIL 40 MG/1
40 TABLET ORAL DAILY
Qty: 90 TABLET | Refills: 3 | Status: SHIPPED | OUTPATIENT
Start: 2025-03-04

## 2025-03-04 RX ORDER — GLIPIZIDE 2.5 MG/1
2.5 TABLET, EXTENDED RELEASE ORAL DAILY
Qty: 90 TABLET | Refills: 3 | Status: SHIPPED | OUTPATIENT
Start: 2025-03-04

## 2025-03-04 RX ORDER — HYDROCHLOROTHIAZIDE 12.5 MG/1
12.5 TABLET ORAL DAILY
Qty: 90 TABLET | Refills: 3 | Status: SHIPPED | OUTPATIENT
Start: 2025-03-04

## 2025-03-04 NOTE — PROGRESS NOTES
Clinic Care Coordination Contact  [facility]/Voicemail  Jossy  ID Lwei     Clinical Data: Care Coordinator Outreach    Outreach Documentation Number of Outreach Attempt   3/4/2025   3:29 PM 1     Left message on patient's voicemail with call back information and requested return call.    Chart Review: Referral from PCP  Reason for Referral: 66yo female needs a wheelchair - please assist     Plan: Care Coordinator will try to reach patient again in 1-2 business days.    FREEDOM Dunn  Clinic Care Coordination  [facility]    Phone: [phone]      Jossy  ID Lwei

## 2025-03-04 NOTE — PATIENT INSTRUCTIONS
Patient Education   You have been provided the Preventive Care Advice document.    Additional copies can be found here: [email or web address]  Preventive Care Advice   This is general advice given by our system to help you stay healthy. However, your care team may have specific advice just for you. Please talk to your care team about your preventive care needs.  Nutrition  Eat 5 or more servings of fruits and vegetables each day.  Try wheat bread, brown rice and whole grain pasta (instead of white bread, rice, and pasta).  Get enough calcium and vitamin D. Check the label on foods and aim for 100% of the RDA (recommended daily allowance).  Lifestyle  Exercise at least 150 minutes each week  (30 minutes a day, 5 days a week).  Do muscle strengthening activities 2 days a week. These help control your weight and prevent disease.  No smoking.  Wear sunscreen to prevent skin cancer.  Have a dental exam and cleaning every 6 months.  Yearly exams  See your health care team every year to talk about:  Any changes in your health.  Any medicines your care team has prescribed.  Preventive care, family planning, and ways to prevent chronic diseases.  Shots (vaccines)   HPV shots (up to age 26), if you've never had them before.  Hepatitis B shots (up to age 59), if you've never had them before.  COVID-19 shot: Get this shot when it's due.  Flu shot: Get a flu shot every year.  Tetanus shot: Get a tetanus shot every 10 years.  Pneumococcal, hepatitis A, and RSV shots: Ask your care team if you need these based on your risk.  Shingles shot (for age 50 and up)  General health tests  Diabetes screening:  Starting at age 35, Get screened for diabetes at least every 3 years.  If you are younger than age 35, ask your care team if you should be screened for diabetes.  Cholesterol test: At age 39, start having a cholesterol test every 5 years, or more often if advised.  Bone density scan (DEXA): At age 50, ask your care team if you  should have this scan for osteoporosis (brittle bones).  Hepatitis C: Get tested at least once in your life.  STIs (sexually transmitted infections)  Before age 24: Ask your care team if you should be screened for STIs.  After age 24: Get screened for STIs if you're at risk. You are at risk for STIs (including HIV) if:  You are sexually active with more than one person.  You don't use condoms every time.  You or a partner was diagnosed with a sexually transmitted infection.  If you are at risk for HIV, ask about PrEP medicine to prevent HIV.  Get tested for HIV at least once in your life, whether you are at risk for HIV or not.  Cancer screening tests  Cervical cancer screening: If you have a cervix, begin getting regular cervical cancer screening tests starting at age 21.  Breast cancer scan (mammogram): If you've ever had breasts, begin having regular mammograms starting at age 40. This is a scan to check for breast cancer.  Colon cancer screening: It is important to start screening for colon cancer at age 45.  Have a colonoscopy test every 10 years (or more often if you're at risk) Or, ask your provider about stool tests like a FIT test every year or Cologuard test every 3 years.  To learn more about your testing options, visit:   .  For help making a decision, visit:   https://bit.ly/ec21989.  Prostate cancer screening test: If you have a prostate, ask your care team if a prostate cancer screening test (PSA) at age 55 is right for you.  Lung cancer screening: If you are a current or former smoker ages 50 to 80, ask your care team if ongoing lung cancer screenings are right for you.  For informational purposes only. Not to replace the advice of your health care provider. Copyright   2023 [employer] [employer]. All rights reserved. Clinically reviewed by the [facility]. [employer] 810354 - REV 01/24.  Preventing Falls: Care Instructions  Injuries and health problems such as trouble  walking or poor eyesight can increase your risk of falling. So can some medicines. But there are things you can do to help prevent falls. You can exercise to get stronger. You can also arrange your home to make it safer.    Talk to your doctor about the medicines you take. Ask if any of them increase the risk of falls and whether they can be changed or stopped.   Try to exercise regularly. It can help improve your strength and balance. This can help lower your risk of falling.         Practice fall safety and prevention.   Wear low-heeled shoes that fit well and give your feet good support. Talk to your doctor if you have foot problems that make this hard.  Carry a cellphone or wear a medical alert device that you can use to call for help.  Use stepladders instead of chairs to reach high objects. Don't climb if you're at risk for falls. Ask for help, if needed.  Wear the correct eyeglasses, if you need them.        Make your home safer.   Remove rugs, cords, clutter, and furniture from walkways.  Keep your house well lit. Use night-lights in hallways and bathrooms.  Install and use sturdy handrails on stairways.  Wear nonskid footwear, even inside. Don't walk barefoot or in socks without shoes.        Be safe outside.   Use handrails, curb cuts, and ramps whenever possible.  Keep your hands free by using a shoulder bag or backpack.  Try to walk in well-lit areas. Watch out for uneven ground, changes in pavement, and debris.  Be careful in the winter. Walk on the grass or gravel when sidewalks are slippery. Use de-icer on steps and walkways. Add non-slip devices to shoes.    Put grab bars and nonskid mats in your shower or tub and near the toilet. Try to use a shower chair or bath bench when bathing.   Get into a tub or shower by putting in your weaker leg first. Get out with your strong side first. Have a phone or medical alert device in the bathroom with you.   Where can you learn more?  Go to  "https://www.[employer].net/patiented  Enter G117 in the search box to learn more about \"Preventing Falls: Care Instructions.\"  Current as of: July 31, 2024  Content Version: 14.3    2024 [employer].   Care instructions adapted under license by your healthcare professional. If you have questions about a medical condition or this instruction, always ask your healthcare professional. [employer] disclaims any warranty or liability for your use of this information.       "

## 2025-03-04 NOTE — PROGRESS NOTES
Preventive Care Visit  [facility] ERICAWENDIE Hope MD, Family Medicine  Mar 4, 2025      Assessment & Plan     Routine general medical examination at a health care facility  Labs, screenings, and vaccines as ordered and counseling as detailed below.    Visit for screening mammogram  - MA Screening Digital Bilateral; Future    Benign essential hypertension  BP not at goal < 140/90. Increase lisinopril to 40mg and restart lower dose hydrochlorothiazide 12.5mg. Come back in 1mo for RN BP check. If BP remains > 140/90, would increase hydrochlorothiazide. Continue checking home BP.  - lisinopril (ZESTRIL) 40 MG tablet; Take 1 tablet (40 mg) by mouth daily.  - hydroCHLOROthiazide 12.5 MG tablet; Take 1 tablet (12.5 mg) by mouth daily.    Screening for colon cancer  Will be due in May.  - YOLY([employer]); Future    Type 2 diabetes mellitus with hyperglycemia, without long-term current use of insulin (H)  Recorded blood sugars are within target range. Continue meds without change. Discussed importance of annual eye exam, foot exam, urine microalbumin. Continue checking blood sugars as they are doing twice a day. Come back end of April for A1c and review of sugars.  - Albumin Random Urine Quantitative with Creat Ratio; Future  - Adult Eye  Referral; Future  - metFORMIN (GLUCOPHAGE XR) 500 MG 24 hr tablet; Take 1 tablet (500 mg) by mouth 2 times daily (with meals). Start taking 1 tablet once daily for a week, then increase it to one tablet twice a day for another week, then increase it to 2 tablet in the morning and 1 tablet in the afternoon for another week, then take 2 tablets by mouth twice a day for maintenance.  - glipiZIDE (GLUCOTROL XL) 2.5 MG 24 hr tablet; Take 1 tablet (2.5 mg) by mouth daily.  - Albumin Random Urine Quantitative with Creat Ratio    Need for vaccination  Will get zoster at future visit.  - Pneumococcal 20 Valent Conjugate (PCV20)  - RSV VACCINE  (ABRYSVO)    Primary osteoarthritis of both knees  Impaired gait and mobility  Pain due to OA is affecting ability to walk and stand. Can't use walker anymore, needs wheelchair. Will continue with tylenol prn pain, and return to ortho (had synvisc injection 9/2024). Will refer to OT for wheelchair assessment, and ask primary care coordination to assist.  - acetaminophen (TYLENOL) 500 MG tablet; Take 1-2 tablets (500-1,000 mg) by mouth every 6 hours as needed for mild pain.  - Occupational Therapy  Referral; Future  - Primary Care - Care Coordination Referral; Future  - Orthopedic  Referral; Future  - Occupational Therapy  Referral; Future  - Primary Care - Care Coordination Referral; Future    Tinea corporis  Not responsive to topical terbinafine, extensive. Treat with fluconazole x4w.  - fluconazole (DIFLUCAN) 150 MG tablet; Take 1 tablet (150 mg) by mouth once a week.    Patient has been advised of split billing requirements and indicates understanding: Yes        Counseling  Appropriate preventive services were addressed with this patient via screening, questionnaire, or discussion as appropriate for fall prevention, nutrition, physical activity, Tobacco-use cessation, social engagement, weight loss and cognition.  Checklist reviewing preventive services available has been given to the patient.  Reviewed patient's diet, addressing concerns and/or questions.       Follow up for diabetes, HTN in 2mo.    The longitudinal plan of care for the diagnosis(es)/condition(s) as documented were addressed during this visit. Due to the added complexity in care, I will continue to support Naw in the subsequent management and with ongoing continuity of care.    Subjective   Naw is a 65 year old, presenting for the following:  Physical, Diabetes (recheck), and Hypertension (Request to change dosage back to 40mg daily)        3/4/2025    10:05 AM   Additional Questions   Roomed by paw p   Accompanied by  daughter          HPI  Hypertension  - checking at home, BP sometimes low and then sometimes high for no reason  - Bps are 140-160s/90s mostly, some 170s, one 120s/70s  - would like to increase lisinopril back to 40mg, feels the 20mg is not working - did try 40mg (two of the 20mg pills) and the BP went down - has been doing this for the past 3 days    Diabetes  - AM fasting 90s-110s, dinner 2h postprandial mostly 110s-130s with some rare blood sugars in 160s  - taking metformin 500mg BID and glipizide ER 5mg  - one low sugar to 79 in the morning, never lower than that - no symptoms    Wondering about the omeprazole - no issues with stomach anymore    Knee pain - makes it difficult to walk  - synvisc given September  - tylenol is effective  - does have a walker  - was told she does not have a meniscus anymore - hurts her to stand on her own, they feel she needs a wheelchair           Advance Care Planning  Patient does not have a Health Care Directive: not discussed due to time constraints      3/4/2025   General Health   How would you rate your overall physical health? (!) FAIR         3/4/2025   Nutrition   Three or more servings of calcium each day? (!) NO   Diet: Low salt    Low fat/cholesterol    Diabetic   How many servings of fruit and vegetables per day? (!) 2-3   How many sweetened beverages each day? 0-1       Multiple values from one day are sorted in reverse-chronological order         2/27/2024   Exercise   Days per week of moderate/strenous exercise 4 days   Average minutes spent exercising at this level 10 min         3/4/2025   Social Factors   Worry food won't last until get money to buy more No   Food not last or not have enough money for food? No   Do you have housing? (Housing is defined as stable permanent housing and does not include staying ouside in a car, in a tent, in an abandoned building, in an overnight shelter, or couch-surfing.) Yes   Are you worried about losing your housing? No   Lack  of transportation? No   Unable to get utilities (heat,electricity)? No         3/4/2025   Fall Risk   Fallen 2 or more times in the past year? No    Trouble with walking or balance? Yes    Reason Gait Speed Test Not Completed Patient does not tolerate an upright or standing position (e.g. wheelchair)       Proxy-reported          3/4/2025   Dental   Dentist two times every year? Yes            Today's PHQ-2 Score:       3/4/2025    10:05 AM   PHQ-2 ( 1999 [employer])   Q1: Little interest or pleasure in doing things 0    Q2: Feeling down, depressed or hopeless 0    PHQ-2 Score 0    Q1: Little interest or pleasure in doing things Not at all   Q2: Feeling down, depressed or hopeless Not at all   PHQ-2 Score 0       Proxy-reported           3/4/2025   Substance Use   Alcohol more than 3/day or more than 7/wk No   Do you use any other substances recreationally? No     Social History     Tobacco Use    Smoking status: Never     Passive exposure: Never    Smokeless tobacco: Never   Vaping Use    Vaping status: Never Used   Substance Use Topics    Alcohol use: No    Drug use: No           1/12/2023   LAST FHS-7 RESULTS   1st degree relative breast or ovarian cancer No   Any relative bilateral breast cancer No   Any male have breast cancer No   Any ONE woman have BOTH breast AND ovarian cancer No   Any woman with breast cancer before 50yrs No   2 or more relatives with breast AND/OR ovarian cancer No   2 or more relatives with breast AND/OR bowel cancer No        Mammogram Screening - Mammogram every 1-2 years updated in Health Maintenance based on mutual decision making        3/4/2025   STI Screening   New sexual partner(s) since last STI/HIV test? No     History of abnormal Pap smear: No - age 30- 64 PAP with HPV every 5 years recommended        Latest Ref Rng & Units 6/14/2023     2:57 PM   PAP / HPV   PAP  Negative for Intraepithelial Lesion or Malignancy (NILM)    HPV 16 DNA Negative Negative    HPV 18 DNA Negative  "Negative    Other HR HPV Negative Negative      ASCVD Risk   The 10-year ASCVD risk score (Nilam LUCERO, et al., 2019) is: 31.7%    Values used to calculate the score:      Age: 65 years      Sex: Female      Is Non- : No      Diabetic: Yes      Tobacco smoker: No      Systolic Blood Pressure: 199 mmHg      Is BP treated: Yes      HDL Cholesterol: 47 mg/dL      Total Cholesterol: 227 mg/dL    Fracture Risk Assessment Tool  Link to Frax Calculator  Use the information below to complete the Frax calculator  : 1960  Sex: female  Weight (kg): 66.3 kg (actual weight)  Height (cm): 148.8 cm  Previous Fragility Fracture:  No  History of parent with fractured hip:  No  Current Smoking:  No  Patient has been on glucocorticoids for more than 3 months (5mg/day or more): No  Rheumatoid Arthritis on Problem List:  No  Secondary Osteoporosis on Problem List:  No  Consumes 3 or more units of alcohol per day: No  Femoral Neck BMD (g/cm2)         3/4/2025   FRAX Calculated Score- 10yr Fracture Probability (%)   Major Osteoporotic- without BMD 7.8 %   Hip Fracture- without BMD 0.8 %          Reviewed and updated as needed this visit by Provider                          Review of Systems  Constitutional, HEENT, cardiovascular, pulmonary, gi and gu systems are negative, except as otherwise noted.     Objective    Exam  BP (!) 199/115   Pulse 87   Temp 98.7  F (37.1  C) (Oral)   Resp 20   Ht 1.488 m (4' 10.6\")   Wt 66.3 kg (146 lb 1.3 oz)   SpO2 96%   BMI 29.91 kg/m     Estimated body mass index is 29.91 kg/m  as calculated from the following:    Height as of this encounter: 1.488 m (4' 10.6\").    Weight as of this encounter: 66.3 kg (146 lb 1.3 oz).  Wt Readings from Last 3 Encounters:   25 66.3 kg (146 lb 1.3 oz)   25 66.3 kg (146 lb 1 oz)   10/03/24 79.4 kg (175 lb 1.9 oz)     BP Readings from Last 3 Encounters:   25 (!) 199/115   25 (!) 165/98   25 (!) 152/92 "       Physical Exam  GENERAL: alert and no distress  EYES: Eyes grossly normal to inspection, PERRL and conjunctivae and sclerae normal  HENT: ear canals and TM's normal, nose and mouth without ulcers or lesions  NECK: no adenopathy, no asymmetry, masses, or scars  RESP: lungs clear to auscultation - no rales, rhonchi or wheezes  CV: regular rate and rhythm, normal S1 S2, no S3 or S4, no murmur, click or rub, no peripheral edema  ABDOMEN: soft, nontender, no hepatosplenomegaly, no masses and bowel sounds normal  MS: no gross musculoskeletal defects noted, no edema  SKIN: no suspicious lesions or rashes except for on both legs there are several raised annular erythematous plaques  NEURO: Normal strength and tone, mentation intact and speech normal  PSYCH: mentation appears normal, affect normal/bright  Diabetic foot exam: normal DP and PT pulses, no trophic changes or ulcerative lesions, normal sensory exam, and normal monofilament exam        Signed Electronically by: Letitia Hope MD

## 2025-03-05 NOTE — PROGRESS NOTES
Clinic Care Coordination Contact  Community Health Worker Initial Outreach  Spoke with patient - Jossy  ID Hta    CHW Initial Information Gathering:  Referral Source: PCP  Preferred Hospital: [facility]  [phone]  Current living arrangement:: I live in a private home with family  Type of residence:: Private home - stairs  Community Resources: PCA, Financial/Insurance, [place] Programs (SNAP)  Supplies Currently Used at Home: None  Equipment Currently Used at Home: walker, standard  Informal Support system:: Family, Children  No PCP office visit in Past Year: No  Transportation means:: Medical transport, Family  CHW Additional Questions  If ED/Hospital discharge, follow-up appointment scheduled as recommended?: N/A  Medication changes made following ED/Hospital discharge?: N/A  MyChart active?: No  Patient agreeable to assistance with activating MyChart?: No    Chart Review: Referral from PCP  Reason for Referral: 66yo female needs a wheelchair - please assist     Patient accepts CC: Yes. Patient scheduled for assessment with SAMSON Reyes on 3/18/25 at 11am. Patient noted desire to discuss support with obtaining wheelchair.     Adia Najera Community Health Worker   Clinic Care Coordination  [facility]    Phone: [phone]

## 2025-03-05 NOTE — RESULT ENCOUNTER NOTE
RN team - please call patient with results. Urine test shows a little bit of protein, which can be a sign of damage to the kidneys. I would like to repeat this test at her next visit to confirm.

## 2025-03-06 ENCOUNTER — TELEPHONE (OUTPATIENT)
Dept: FAMILY MEDICINE | Facility: CLINIC | Age: 65
End: 2025-03-06
Payer: COMMERCIAL

## 2025-03-06 NOTE — TELEPHONE ENCOUNTER
Called patient and left message to call clinic back. If patient calls back, please relay message.        Ish Stewart, BSN RN  [facility]

## 2025-03-06 NOTE — TELEPHONE ENCOUNTER
----- Message from Letitia Owens-Andrew sent at 3/5/2025  5:53 PM CST -----  RN team - please call patient with results. Urine test shows a little bit of protein, which can be a sign of damage to the kidneys. I would like to repeat this test at her next visit to confirm.

## 2025-03-06 NOTE — TELEPHONE ENCOUNTER
Pt return call (gave verbal permission for writer to speak to son (English speaking). Writer relayed clinician's message and recommendation. Pt / son verbalized understanding and agreed with plan. Next follow-up with is 4/29/2025.    Gracy CARCAMO RN  [facility]      ----- Message from Letitia Owens-Andrew sent at 3/5/2025  5:53 PM CST -----  RN team - please call patient with results. Urine test shows a little bit of protein, which can be a sign of damage to the kidneys. I would like to repeat this test at her next visit to confirm.

## 2025-03-10 ENCOUNTER — TRANSFERRED RECORDS (OUTPATIENT)
Dept: HEALTH INFORMATION MANAGEMENT | Facility: CLINIC | Age: 65
End: 2025-03-10
Payer: COMMERCIAL

## 2025-03-17 ENCOUNTER — ALLIED HEALTH/NURSE VISIT (OUTPATIENT)
Dept: EDUCATION SERVICES | Facility: CLINIC | Age: 65
End: 2025-03-17
Payer: COMMERCIAL

## 2025-03-17 DIAGNOSIS — E11.9 NEW ONSET TYPE 2 DIABETES MELLITUS (H): Primary | ICD-10-CM

## 2025-03-17 PROCEDURE — T1013 SIGN LANG/ORAL INTERPRETER: HCPCS | Mod: U4

## 2025-03-17 NOTE — PROGRESS NOTES
Diabetes Self-Management Education & Support    Presents for: Individual review    Type of Service: In Person Visit      Assessment      Patient seen today for follow up. Son, daughter and professional  are present for visit today as well.      Patient brings in blood sugar log. She is checking BID, fasting and about 2.5 hours after dinner. Readings since our last visit:     FB, 123, 113, 100, 108, 94, 116, 123, 101, 112, 105, 115, 110, 120, 98, 93, 79, 136, 93, 108, 122, 90, 85, 87, 103, 134, 90, 128, 82, 125, 100, 109, 107  After dinner: 112, 127, 129, 118, 104, 106, 120, 135, 120, 160, 106, 125, 128, 135, 131, 148, 167, 134, 125, 83, 101, 130, 110, 153, 141, 82, 139, 159, 174, 179, 180, 198       FB% in range   After dinner/HS: 96% in range      Patient brings in all of her medications.  She is taking glipizide 2.5 mg/day and metformin 500 mg BID. She does have titration instructions for metformin to increase by 1 pill/week to get to a total of 4 tabs/2,000 mg/day. Daughter states pt has just been taking the 500 mg BID. Given her BG are in goal, will continue with 500 mg BID for now. She will have her A1c done next month.      Reviewed diet.  Patient is eating 3 meals per day meals consist of about 1 cup of white rice.  Pt feels she is doing well with diet and avoiding sweets. She states if she feels cravings for sweets she will have some fruit.      Patient is using a wheelchair.  Daughter notes she does not do a lot of walking.  Patient is doing some stretching in the morning. They recently got a stationary bike and patient has been using this. Encouraged pt to continue to work on increasing daily activity and impact on BG.            Patient's most recent   Lab Results   Component Value Date    A1C 14.6 2025     is not meeting goal of <7.0    Diabetes knowledge and skills assessment:   Patient is knowledgeable in diabetes management concepts related to: Healthy Eating, Being Active,  Monitoring, and Taking Medication    Based on learning assessment above, most appropriate setting for further diabetes education would be: Individual setting.    Care Plan and Education Provided:  Healthy Eating: Balanced meals, Consistency in amount and timing of carbohydrate intake, and Portion control, Being Active: Amount recommended (150 minutes moderate or 75 minutes vigorous activity and 2-3 days strength training per week), Finding a physical activity routine that works for you, and Relationship of activity to glucose, Monitoring: Frequency of monitoring and Individual glucose targets, Taking Medication: When to take medication(s), Problem Solving: High glucose - causes, signs/symptoms, treatment and prevention, Low glucose - causes, signs/symptoms, treatment and prevention, Rule of 15 and carrying a carbohydrate source at all times in case of low glucose, and When to call a health care provider, Reducing Risks: Goal for A1c, how it relates to glucose and how often to check, and Healthy Coping: Benefits of making appropriate lifestyle changes    Patient verbalized understanding of diabetes self-management education concepts discussed, opportunities for ongoing education and support, and recommendations provided today.    Plan    No changes today. Continue to monitor and log BG daily.   Continue to work on increasing daily activity.     Follow-up:  PCP in 1 month - can have A1c at that time.   CDE 6/2/25 as scheduled - pt would like to follow up PRN after that visit as long as she is meeting goals     See Care Plan for co-developed, patient-state behavior change goals.    Subjective/Objective  Breana is an 65 year old year old, presenting for the following diabetes education related to: Presents for: Individual review  Accompanied by: , Daughter, Son  Diabetes education in the past 24mo: Yes  Diabetes type: Type 2  Cultural Influences/Ethnic Background:  Not  or     Diabetes Symptoms &  "Complications:          Patient Problem List and Family Medical History reviewed for relevant medical history, current medical status, and diabetes risk factors.    Vitals:  There were no vitals taken for this visit.  Estimated body mass index is 29.91 kg/m  as calculated from the following:    Height as of 3/4/25: 1.488 m (4' 10.6\").    Weight as of 3/4/25: 66.3 kg (146 lb 1.3 oz).   Last 3 BP:   BP Readings from Last 3 Encounters:   03/04/25 (!) 188/98   01/28/25 (!) 165/98   01/27/25 (!) 152/92       History   Smoking Status    Never   Smokeless Tobacco    Never       Labs:  Lab Results   Component Value Date    A1C 14.6 01/27/2025     Lab Results   Component Value Date     01/28/2025     Lab Results   Component Value Date     10/03/2024     Direct Measure HDL   Date Value Ref Range Status   10/03/2024 47 (L) >=50 mg/dL Final   ]  GFR Estimate   Date Value Ref Range Status   01/28/2025 >90 >60 mL/min/1.73m2 Final   03/10/2020 >60 >60 mL/min/1.73m2 Final     GFR Estimate If Black   Date Value Ref Range Status   03/10/2020 >60 >60 mL/min/1.73m2 Final     Lab Results   Component Value Date    CR 0.50 01/28/2025     No results found for: \"MICROALBUMIN\"    Healthy Eating:  Healthy Eating Assessed Today: Yes  Meals include: Breakfast, Lunch, Dinner  Beverages: Water, Diet soda    Being Active:  Being Active Assessed Today: Yes  Barrier to exercise: Physical limitation    Monitoring:  Monitoring Assessed Today: Yes  Did patient bring glucose meter to appointment? : No  Times checking blood sugar at home (number): 2  Times checking blood sugar at home (per): Day  Blood glucose trend: Decreasing        Taking Medications:  Diabetes Medication(s)       Biguanides       metFORMIN (GLUCOPHAGE XR) 500 MG 24 hr tablet Take 1 tablet (500 mg) by mouth 2 times daily (with meals). Start taking 1 tablet once daily for a week, then increase it to one tablet twice a day for another week, then increase it to 2 tablet in " the morning and 1 tablet in the afternoon for another week, then take 2 tablets by mouth twice a day for maintenance.       Sulfonylureas       glipiZIDE (GLUCOTROL XL) 2.5 MG 24 hr tablet Take 1 tablet (2.5 mg) by mouth daily.          Taking Medication Assessed Today: Yes  Current Treatments: Diet, Oral Medication (taken by mouth)  Problems taking diabetes medications regularly?: No  Diabetes medication side effects?: No    Problem Solving:           Reducing Risks:  Reducing Risks Assessed Today: No    Healthy Coping:  Healthy Coping Assessed Today: Yes  Emotional response to diabetes: Ready to learn  Informal Support system:: Children, Family  Stage of change: ACTION (Actively working towards change)    Rebecca De La O RN  Time Spent: 30 minutes  Encounter Type: Individual    Any diabetes medication dose changes were made via the CDCES Standing Orders under the patient's referring provider.

## 2025-03-17 NOTE — LETTER
3/17/2025         RE: Breana Conner   [facility] [id number]        Dear Colleague,    Thank you for referring your patient, Breana Conner, to the [facility]. Please see a copy of my visit note below.    Diabetes Self-Management Education & Support    Presents for: Individual review    Type of Service: In Person Visit      Assessment      Patient seen today for follow up. Son, daughter and professional  are present for visit today as well.      Patient brings in blood sugar log. She is checking BID, fasting and about 2.5 hours after dinner. Readings since our last visit:     FB, 123, 113, 100, 108, 94, 116, 123, 101, 112, 105, 115, 110, 120, 98, 93, 79, 136, 93, 108, 122, 90, 85, 87, 103, 134, 90, 128, 82, 125, 100, 109, 107  After dinner: 112, 127, 129, 118, 104, 106, 120, 135, 120, 160, 106, 125, 128, 135, 131, 148, 167, 134, 125, 83, 101, 130, 110, 153, 141, 82, 139, 159, 174, 179, 180, 198       FB% in range   After dinner/HS: 96% in range      Patient brings in all of her medications.  She is taking glipizide 2.5 mg/day and metformin 500 mg BID. She does have titration instructions for metformin to increase by 1 pill/week to get to a total of 4 tabs/2,000 mg/day. Daughter states pt has just been taking the 500 mg BID. Given her BG are in goal, will continue with 500 mg BID for now. She will have her A1c done next month.      Reviewed diet.  Patient is eating 3 meals per day meals consist of about 1 cup of white rice.  Pt feels she is doing well with diet and avoiding sweets. She states if she feels cravings for sweets she will have some fruit.      Patient is using a wheelchair.  Daughter notes she does not do a lot of walking.  Patient is doing some stretching in the morning. They recently got a stationary bike and patient has been using this. Encouraged pt to continue to work on increasing daily activity and impact on BG.            Patient's most  recent   Lab Results   Component Value Date    A1C 14.6 01/27/2025     is not meeting goal of <7.0    Diabetes knowledge and skills assessment:   Patient is knowledgeable in diabetes management concepts related to: Healthy Eating, Being Active, Monitoring, and Taking Medication    Based on learning assessment above, most appropriate setting for further diabetes education would be: Individual setting.    Care Plan and Education Provided:  Healthy Eating: Balanced meals, Consistency in amount and timing of carbohydrate intake, and Portion control, Being Active: Amount recommended (150 minutes moderate or 75 minutes vigorous activity and 2-3 days strength training per week), Finding a physical activity routine that works for you, and Relationship of activity to glucose, Monitoring: Frequency of monitoring and Individual glucose targets, Taking Medication: When to take medication(s), Problem Solving: High glucose - causes, signs/symptoms, treatment and prevention, Low glucose - causes, signs/symptoms, treatment and prevention, Rule of 15 and carrying a carbohydrate source at all times in case of low glucose, and When to call a health care provider, Reducing Risks: Goal for A1c, how it relates to glucose and how often to check, and Healthy Coping: Benefits of making appropriate lifestyle changes    Patient verbalized understanding of diabetes self-management education concepts discussed, opportunities for ongoing education and support, and recommendations provided today.    Plan    No changes today. Continue to monitor and log BG daily.   Continue to work on increasing daily activity.     Follow-up:  PCP in 1 month - can have A1c at that time.   CDE 6/2/25 as scheduled - pt would like to follow up PRN after that visit as long as she is meeting goals     See Care Plan for co-developed, patient-state behavior change goals.    Subjective/Objective  Breana is an 65 year old year old, presenting for the following diabetes  "education related to: Presents for: Individual review  Accompanied by: , Daughter, Son  Diabetes education in the past 24mo: Yes  Diabetes type: Type 2  Cultural Influences/Ethnic Background:  Not  or     Diabetes Symptoms & Complications:          Patient Problem List and Family Medical History reviewed for relevant medical history, current medical status, and diabetes risk factors.    Vitals:  There were no vitals taken for this visit.  Estimated body mass index is 29.91 kg/m  as calculated from the following:    Height as of 3/4/25: 1.488 m (4' 10.6\").    Weight as of 3/4/25: 66.3 kg (146 lb 1.3 oz).   Last 3 BP:   BP Readings from Last 3 Encounters:   03/04/25 (!) 188/98   01/28/25 (!) 165/98   01/27/25 (!) 152/92       History   Smoking Status     Never   Smokeless Tobacco     Never       Labs:  Lab Results   Component Value Date    A1C 14.6 01/27/2025     Lab Results   Component Value Date     01/28/2025     Lab Results   Component Value Date     10/03/2024     Direct Measure HDL   Date Value Ref Range Status   10/03/2024 47 (L) >=50 mg/dL Final   ]  GFR Estimate   Date Value Ref Range Status   01/28/2025 >90 >60 mL/min/1.73m2 Final   03/10/2020 >60 >60 mL/min/1.73m2 Final     GFR Estimate If Black   Date Value Ref Range Status   03/10/2020 >60 >60 mL/min/1.73m2 Final     Lab Results   Component Value Date    CR 0.50 01/28/2025     No results found for: \"MICROALBUMIN\"    Healthy Eating:  Healthy Eating Assessed Today: Yes  Meals include: Breakfast, Lunch, Dinner  Beverages: Water, Diet soda    Being Active:  Being Active Assessed Today: Yes  Barrier to exercise: Physical limitation    Monitoring:  Monitoring Assessed Today: Yes  Did patient bring glucose meter to appointment? : No  Times checking blood sugar at home (number): 2  Times checking blood sugar at home (per): Day  Blood glucose trend: Decreasing        Taking Medications:  Diabetes Medication(s)       " Biguanides       metFORMIN (GLUCOPHAGE XR) 500 MG 24 hr tablet Take 1 tablet (500 mg) by mouth 2 times daily (with meals). Start taking 1 tablet once daily for a week, then increase it to one tablet twice a day for another week, then increase it to 2 tablet in the morning and 1 tablet in the afternoon for another week, then take 2 tablets by mouth twice a day for maintenance.       Sulfonylureas       glipiZIDE (GLUCOTROL XL) 2.5 MG 24 hr tablet Take 1 tablet (2.5 mg) by mouth daily.          Taking Medication Assessed Today: Yes  Current Treatments: Diet, Oral Medication (taken by mouth)  Problems taking diabetes medications regularly?: No  Diabetes medication side effects?: No    Problem Solving:           Reducing Risks:  Reducing Risks Assessed Today: No    Healthy Coping:  Healthy Coping Assessed Today: Yes  Emotional response to diabetes: Ready to learn  Informal Support system:: Children, Family  Stage of change: ACTION (Actively working towards change)    Rebecca De La O RN  Time Spent: 30 minutes  Encounter Type: Individual    Any diabetes medication dose changes were made via the CDCES Standing Orders under the patient's referring provider.

## 2025-03-18 ENCOUNTER — PATIENT OUTREACH (OUTPATIENT)
Dept: NURSING | Facility: CLINIC | Age: 65
End: 2025-03-18
Payer: COMMERCIAL

## 2025-03-18 ASSESSMENT — ACTIVITIES OF DAILY LIVING (ADL)
DEPENDENT_IADLS:: CLEANING;COOKING;LAUNDRY;SHOPPING;MEAL PREPARATION;MEDICATION MANAGEMENT;MONEY MANAGEMENT;TRANSPORTATION;INCONTINENCE

## 2025-03-18 NOTE — PROGRESS NOTES
Clinic Care Coordination Contact  Clinic Care Coordination Contact  OUTREACH    Referral Information:  Referral Source: PCP    Primary Diagnosis: Chronic Pain (DME)    Chief Complaint   Patient presents with    Clinic Care Coordination - Initial     Clinic Utilization  Difficulty keeping appointments:: No  Compliance Concerns: No  No-Show Concerns: No  No PCP office visit in Past Year: No  Utilization      No Show Count (past year)  7             ED Visits  0             Hospital Admissions  0                    Current as of: 3/18/2025 10:40 AM            Clinical Concerns:  CCRN completed initial assessment with patient via phone today. Patient was referred to CC by PCP to assist patient obtain a wheel chair. Patient lives with children and has a PCA. Son is her PCA. Patient denies other needs.     [facility]   Per patient, she already attended eye exam appointment on 3/4/25. Prescription glasses not needed.     Wheel chair  Patient is scheduled to see an OT on 4/16/25 for wheel chair evaluation. New goal created today.     Pain  Pain (GOAL):: No  Health Maintenance Reviewed: Due/Overdue   Clinical Pathway: None    Medication Management:  Medication review status: Medications reviewed and no changes reported per patient.           Functional Status:  Dependent ADLs:: Bathing, Dressing, Grooming, Ambulation-walker, Toileting, Transfers  Dependent IADLs:: Cleaning, Cooking, Laundry, Shopping, Meal Preparation, Medication Management, Money Management, Transportation, Incontinence  Bed or wheelchair confined:: No  Mobility Status: Independent w/Device  Fallen 2 or more times in the past year?: No  Any fall with injury in the past year?: No    Living Situation:  Current living arrangement:: I live in a private home with family  Type of residence:: Private home - stairs    Lifestyle & Psychosocial Needs:    Social Drivers of Health     Food Insecurity: Low Risk  (3/4/2025)    Food Insecurity      Within the past 12 months, did you worry that your food would run out before you got money to buy more?: No     Within the past 12 months, did the food you bought just not last and you didn t have money to get more?: No   Depression: Not at risk (3/4/2025)    PHQ-2     PHQ-2 Score: 0   Housing Stability: Low Risk  (3/4/2025)    Housing Stability     Do you have housing? : Yes     Are you worried about losing your housing?: No   Tobacco Use: Low Risk  (3/4/2025)    Patient History     Smoking Tobacco Use: Never     Smokeless Tobacco Use: Never     Passive Exposure: Never   Financial Resource Strain: Low Risk  (3/4/2025)    Financial Resource Strain     Within the past 12 months, have you or your family members you live with been unable to get utilities (heat, electricity) when it was really needed?: No   Alcohol Use: Not on file   Transportation Needs: Low Risk  (3/4/2025)    Transportation Needs     Within the past 12 months, has lack of transportation kept you from medical appointments, getting your medicines, non-medical meetings or appointments, work, or from getting things that you need?: No   Physical Activity: Insufficiently Active (2/27/2024)    Exercise Vital Sign     Days of Exercise per Week: 4 days     Minutes of Exercise per Session: 10 min   Interpersonal Safety: Unknown (2/27/2024)    Interpersonal Safety     Do you feel physically and emotionally safe where you currently live?: Patient unable to answer     Within the past 12 months, have you been hit, slapped, kicked or otherwise physically hurt by someone?: Patient unable to answer     Within the past 12 months, have you been humiliated or emotionally abused in other ways by your partner or ex-partner?: Patient unable to answer   Stress: No Stress Concern Present (2/27/2024)    Citizen of [place] [employer] - Occupational Stress Questionnaire     Feeling of Stress : Not at all   Social Connections: Unknown (2/27/2024)    Social Connection  and Isolation Panel [NHANES]     Frequency of Communication with Friends and Family: Not on file     Frequency of Social Gatherings with Friends and Family: Twice a week     Attends Caodaism Services: Not on file     Active Member of Clubs or Organizations: Not on file     Attends Club or Organization Meetings: Not on file     Marital Status: Not on file   Health Literacy: Not on file     Diet:: Regular  Inadequate nutrition (GOAL):: No  Tube Feeding: No  Inadequate activity/exercise (GOAL):: Yes  Significant changes in sleep pattern (GOAL): No  Transportation means:: Family, Regular car     Caodaism or spiritual beliefs that impact treatment:: No  Mental health DX:: No  Mental health management concern (GOAL):: No  Chemical Dependency Status: No Current Concerns  Informal Support system:: Family, Children      Resources and Interventions:  Current Resources:      Community Resources: PCA, Financial/Insurance, County Programs (SNAP)  Supplies Currently Used at Home: None  Equipment Currently Used at Home: walker, standard  Employment Status: unemployed         Advance Care Plan/Directive  Advanced Care Plans/Directives on file:: No  Discussed with patient/caregiver:: Declined Further Information    Referrals Placed: None     Care Plan:  Care Plan: Wheel chair       Problem: impaired mobility       Goal: Patient would like to explore if she could qualify for a wheel chair in the next 90 days.       Start Date: 3/18/2025 Expected End Date: 6/30/2025    This Visit's Progress: 10%    Note:     Barriers: language   Strengths: Accepting of support  Patient expressed understanding of goal: Yes    Action steps to achieve this goal:  1. I will complete a face-to-face appointment with my PCP if needed  2. I will complete an OT/PT evaluation if it is recommended for me to obtain DMEs on 4/16/25 at Mikala Tejada, OT at 1:30 pm.   3. I will answer my phone when DME Provider  contacts me to deliver or  my item.  4.  I will follow up with CCC in the next month regarding this goal for additional coordination support.    Note: Order for a TBD  was sent to DME Provider  on TBD                                Outreach Frequency: 6 weeks, more frequently as needed  Future Appointments                In 2 weeks Gerardo Leon MD [facility]    In 2 weeks SPRO RN 1 [facility]    In 2 weeks SPRO MAMMO [facility] SPRO    In 4 weeks Mikala Tejada OT [facility]    In 1 month Letitia Hope MD [facility] SPRO    In 2 months Rebecca De La O, JACKIE [facility] MPL[facility]            Plan: Patient will attend her OT appointment as scheduled for w/c evaluation on 4/16/25.

## 2025-03-18 NOTE — LETTER
[facility]  Patient Centered Plan of Care  About Me:        Patient Name:  Breana Conner    YOB: 1960  Age:         65 year old   [place]a MRN:    [id number] Telephone Information:  Home Phone [phone]   Mobile [phone]   Mobile [phone]   Mobile Not on file.       Address:  [address] [place] [address] Email address:  No e-mail address on record      Emergency Contact(s)    Name Relationship Lgl Grd Work Phone Home Phone Mobile Phone   1. ESE SOLO Son    [phone]   2. JOSEL  SAY Daughter   [phone]            Primary language:  Jossy     needed? Yes   [facility]:  [phone] op. 1  Other communication barriers:Language barrier; Physical impairment    Preferred Method of Communication:     Current living arrangement: I live in a private home with family    Mobility Status/ Medical Equipment: Independent w/Device        Health Maintenance  Health Maintenance Reviewed: Due/Overdue       My Access Plan  Medical Emergency 911   Primary Clinic Line [facility] [phone]   24 Hour Appointment Line [phone] or  [phone]) (toll-free)   24 Hour Nurse Line [phone] (toll-free)   Preferred Urgent Care [facility] [phone]     Preferred Hospital [facility]  [phone]     Preferred Pharmacy [facility] - [address]     Behavioral Health Crisis Line The National Suicide Prevention Lifeline at 1-555.999.2232 or Text/Call 878           My Care Team Members  Patient Care Team         Relationship Specialty Notifications Start End    Letitia Hope MD PCP - General Family Medicine  10/3/24     Phone: [phone] Fax: [phone]         [address] [facility], [address] [facility] [address]    Ese Solo (Son) Personal Care Attendant PCA   9/25/23     5 hours of PCA per day  [facility] [phone], fax [phone]    Phone:  [phone] Fax: [phone]        Gerardo Leon MD Assigned Musculoskeletal Provider   3/23/24     Phone: [phone] Fax: [phone]         [address] [facility] [address]    Letitia Hope MD Assigned PCP   10/23/24     Phone: [phone] Fax: [phone] [address] [facility], [address] [facility] [address]    Rhys Aviles MD MD Emergency Medicine  1/29/25     Referred to Diabetes Education    Phone: [phone] Fax: [phone]         [address] [facility] [address]    Rebecca De La O, RN Diabetes Educator Diabetes Education  1/29/25     Livia Moffett [facility] Pharmacist   2/3/25     Phone: [phone] Fax: [phone]         [address] [place] [address]    Livia Moffett [facility] Assigned [place] Pharmacist   2/23/25     Phone: [phone] Fax: [phone]         [address] [place] [address]    Adia Najera CHW Community Health Worker Primary Care - [facility] Admissions 3/4/25     Phone: [phone]         Terri Perea, RN Lead Care Coordinator Primary Care - [facility] Admissions 3/5/25     Phone: [phone]                     My Care Plans  Self Management and Treatment Plan    Care Plan  Care Plan: Wheel chair       Problem: impaired mobility       Goal: Patient would like to explore if she could qualify for a wheel chair in the next 90 days.       Start Date: 3/18/2025 Expected End Date: 6/30/2025    This Visit's Progress: 10%    Note:     Barriers: language   Strengths: Accepting of support  Patient expressed understanding of goal: Yes    Action steps to achieve this goal:  1. I will complete a face-to-face appointment with my PCP if needed  2. I will complete an OT/PT evaluation if it is recommended for me to obtain DMEs on 4/16/25 at [facility] at 1:30 pm.   3. I will answer my phone when DME Provider  contacts me to deliver or  my item.  4. I will follow up with [facility] in the next month regarding this goal for  additional coordination support.    Note: Order for a TBD  was sent to DME Provider  on TBD                                Action Plans on File:                       Advance Care Plans/Directives:   Advanced Care Plan/Directives on file: No    Discussed with patient/caregiver(s): Declined Further Information             My Medical and Care Information  Problem List   Patient Active Problem List   Diagnosis    Benign essential hypertension    Hyperlipidemia LDL goal <130    Primary osteoarthritis of both knees    Neutrophilia    Tinea corporis      Current Medications:  Please refer to the most recent medication list provided to you by your medical team and reach out to your provider with any questions or to make any corrections.    Care Coordination Start Date: 3/4/2025   Frequency of Care Coordination: 6 weeks, more frequently as needed     Form Last Updated: 03/18/2025

## 2025-04-03 ENCOUNTER — OFFICE VISIT (OUTPATIENT)
Dept: ORTHOPEDICS | Facility: CLINIC | Age: 65
End: 2025-04-03
Attending: FAMILY MEDICINE
Payer: COMMERCIAL

## 2025-04-03 DIAGNOSIS — M17.0 PRIMARY OSTEOARTHRITIS OF BOTH KNEES: ICD-10-CM

## 2025-04-03 DIAGNOSIS — M17.0 PRIMARY OSTEOARTHRITIS OF BOTH KNEES: Primary | ICD-10-CM

## 2025-04-03 NOTE — PROGRESS NOTES
CC: Bilateral knee follow-up    HPI: The use of a virtual  was utilized for this visit we appreciate their assistance.    Patient is a 65-year-old female seen here today with her adult son for follow-up of bilateral knees.  She is well-known to my practice.  For full history and physical please see my prior notes.  In brief review she has noted significant varus deformity of both knees.  She has medial patellofemoral osteoarthritis.  This has been treated nonoperatively thus far.  We discussed operative intervention but she does not feel that her symptoms warrant total knee arthroplasty at this time.  When I last saw her in September, we underwent bilateral hyaluronic acid injections.  She states this provided excellent pain relief for approximately 6 months.  Over the last few weeks they have started to wear off.  She gets anterior and medial knee pain while ambulating.  She feels that this limits her from ambulating long distances.  She is interested in retrialing injections at this time.    Objective:   PE:  PE:  B/L LE: Clinical varus deformity noted.  Pain to palpation over the medial joint line.  Patellar grind test positive for pain and crepitus.  Passive range of motion is 0 to 110 degrees any flexion.  Active range of motion is equivalent to passive range of motion.    A/P:  Patient is a 65-year-old female known to my practice with bilateral knee osteoarthritis.  She did very well with her last round of hyaluronic acid injections.  She is very happy with the relief that the injections give her.  I again discussed that the ultimate surgical treatment would be a total knee arthroplasty.  She is not interested in having a knee replacement.  She feels she is getting adequate relief from the injections.  I think that is very reasonable.  She would like to repeat hyaluronic acid injections at this time.  I will get her scheduled with one of my sports medicine colleagues for bilateral knee  hyaluronic acid injections.  Will submit prior authorization for this.  I would like her to continue following with sports medicine for nonoperative management of her bilateral knee osteoarthritis.  I am happy to see her back in the future if she is interested in discussing a total knee arthroplasty.    Gerardo Leon MD    [facility]   Department of Orthopedic Surgery      Disclaimer: This note consists of symbols derived from keyboarding, dictation and/or voice recognition software. As a result, there may be errors in the script that have gone undetected. Please consider this when interpreting information found in this chart.

## 2025-04-03 NOTE — LETTER
4/3/2025      Breana Conner  [address] [facility] [address]      Dear Colleague,    Thank you for referring your patient, Breana Conner, to the [facility]. Please see a copy of my visit note below.    CC: Bilateral knee follow-up    HPI: The use of a virtual  was utilized for this visit we appreciate their assistance.    Patient is a 65-year-old female seen here today with her adult son for follow-up of bilateral knees.  She is well-known to my practice.  For full history and physical please see my prior notes.  In brief review she has noted significant varus deformity of both knees.  She has medial patellofemoral osteoarthritis.  This has been treated nonoperatively thus far.  We discussed operative intervention but she does not feel that her symptoms warrant total knee arthroplasty at this time.  When I last saw her in September, we underwent bilateral hyaluronic acid injections.  She states this provided excellent pain relief for approximately 6 months.  Over the last few weeks they have started to wear off.  She gets anterior and medial knee pain while ambulating.  She feels that this limits her from ambulating long distances.  She is interested in retrialing injections at this time.    Objective:   PE:  PE:  B/L LE: Clinical varus deformity noted.  Pain to palpation over the medial joint line.  Patellar grind test positive for pain and crepitus.  Passive range of motion is 0 to 110 degrees any flexion.  Active range of motion is equivalent to passive range of motion.    A/P:  Patient is a 65-year-old female known to my practice with bilateral knee osteoarthritis.  She did very well with her last round of hyaluronic acid injections.  She is very happy with the relief that the injections give her.  I again discussed that the ultimate surgical treatment would be a total knee arthroplasty.  She is not interested in having a knee replacement.  She feels she is  getting adequate relief from the injections.  I think that is very reasonable.  She would like to repeat hyaluronic acid injections at this time.  I will get her scheduled with one of my sports medicine colleagues for bilateral knee hyaluronic acid injections.  Will submit prior authorization for this.  I would like her to continue following with sports medicine for nonoperative management of her bilateral knee osteoarthritis.  I am happy to see her back in the future if she is interested in discussing a total knee arthroplasty.    Gerardo Leon MD    [facility]   Department of Orthopedic Surgery      Disclaimer: This note consists of symbols derived from keyboarding, dictation and/or voice recognition software. As a result, there may be errors in the script that have gone undetected. Please consider this when interpreting information found in this chart.       Again, thank you for allowing me to participate in the care of your patient.        Sincerely,        Gerardo Leon MD    Electronically signed

## 2025-04-04 ENCOUNTER — ANCILLARY PROCEDURE (OUTPATIENT)
Dept: MAMMOGRAPHY | Facility: CLINIC | Age: 65
End: 2025-04-04
Payer: COMMERCIAL

## 2025-04-04 ENCOUNTER — TELEPHONE (OUTPATIENT)
Dept: FAMILY MEDICINE | Facility: CLINIC | Age: 65
End: 2025-04-04

## 2025-04-04 DIAGNOSIS — Z12.31 VISIT FOR SCREENING MAMMOGRAM: ICD-10-CM

## 2025-04-04 PROCEDURE — 77067 SCR MAMMO BI INCL CAD: CPT | Mod: TC | Performed by: RADIOLOGY

## 2025-04-04 NOTE — LETTER
April 9, 2025      [facility] [place]  [address] [facility] [address]        Dear MsDagoberto,     Nursing has been trying to reach you on the phone with no answer.  The doctor wanted you to know your blood pressure looks good.  Please continue to take all of your medications as prescribed.      If you have any questions or concerns, please call the clinic at the number listed above.       Sincerely,        Loretta Hope MD/[facility]  Electronically signed

## 2025-04-04 NOTE — TELEPHONE ENCOUNTER
Letitia Hope MD  [facility] [place] - Primary Care  RN team please let patient know blood pressure looks good so she should keep taking all of her blood pressure medicines as prescribed.

## 2025-04-08 NOTE — PROGRESS NOTES
Sports Medicine Procedure Note    Breana was seen today for pain and pain.    Diagnoses and all orders for this visit:    Primary osteoarthritis of both knees      Breana Conner is a/an 65 year old female who is seen for Synvisc injection of both knees.   Last injection: bilateral knee gel injections 9/25/24, lasted 6 months or longer for both knees      -Post-injection instructions were provided to the patient  -Return > 6 months for repeat  injection sooner if develops new or worsening symptoms.  -Aspirated synovial fluid from bilateral knees  -On both knees had small pustules anterior quad sparring knee. Made patient aware prior to injection. Did not apppear like acute allergy infection or hives. Denies fever or chills. Will continue to monitor and if spreads will follow-up with PCP.     Options for treatment and/or follow-up care were reviewed with the patient was actively involved in the decision making process. Patient verbalized understanding and was in agreement with the plan.  Agreeable to injection injection. Discussed risks and benefits. they are aware of risks such as but not limited to allergy, Soft tissue/ tendon damage, skin hypopigmentation, bleeding, and infection.       Large Joint Injection/Arthocentesis: bilateral knee synvisc one    Date/Time: 4/14/2025 10:17 AM    Performed by: Flower Lazaro DO  Authorized by: Flower Lazaro DO    Indications:  Pain, joint swelling and osteoarthritis  Needle Size:  18 G  Guidance: ultrasound    Approach:  Anterolateral  Location:  Knee  Laterality:  Bilateral      Medications (Right):  48 mg hylan 48 MG/6ML; 3 mL lidocaine 1 %  Aspirate amount (mL):  19  Aspirate:  Clear, yellow and blood-tinged  Medications (Left):  48 mg hylan 48 MG/6ML; 3 mL lidocaine 1 %  Aspirate amount (mL):  5  Aspirate:  Clear  Outcome:  Tolerated well, no immediate complications  Procedure discussed: discussed risks, benefits, and alternatives    Consent Given by:  Patient  Timeout:  timeout called immediately prior to procedure    Prep: patient was prepped and draped in usual sterile fashion     Ultrasound was used to ensure safe and accurate needle placement and injection. Ultrasound images of the procedure were permanently stored.  Pre-procedure: Patient present with small red bumps on both knees. Patient made aware these are present before procedure initiated.    Due to language barrier, an  was present during the history-taking procedure and subsequent discussion with this patient.       Flower Lazaro DO  [facility] SPORTS MEDICINE CLINIC [facility]        Post-Hyaluronic Acid Injection Discharge Instructions    A hyaluronic acid gel injection was performed today in clinic  - Ok to shower today, but please do not submerge for 48 hours (including bath tub, hot tub or swimming)  - The lidocaine (local numbing medicine) will wear off in several hours. The Gel injection usually takes several weeks to begin working, so you may not notice a difference in pain for 3-6 weeks.  - Hyaluronic acid gel has a small risk of increasing pain/swelling. If this occurs, it generally happens within 24 hours but will usually resolve on its own. You may use ice packs for 15-20 minutes, 3 to 4 times a day at the injection site for comfort if needed, and I recommend limiting your activity. If pain/swelling continues, then please the office.  -Can repeat injection at 6 months with insurance authorization    If you experience any of the following, call Sports Medicine @ [phone] or [phone]  -Fever over 100 degrees F  -Swelling, bleeding, redness, drainage, warmth at the injection site  -New or significant worsening pain      Dr. Flower Lazaro, DO  [facility]  Sports Medicine

## 2025-04-08 NOTE — TELEPHONE ENCOUNTER
Called patient and left message to call clinic back. Please relay message if patient calls back.      HUNG Sinha CemN RN  [facility]

## 2025-04-09 NOTE — TELEPHONE ENCOUNTER
Patient returned call and below message was relayed.  She had no further questions or concerns.    Pt voiced understanding and agreeable to plan.  Will call the clinic if any other questions or concerns arise.      Dinorah Casarez RN BSN  Clinic Nurse  [facility]

## 2025-04-09 NOTE — TELEPHONE ENCOUNTER
Called patient with no answer.  Message left on daughter/son/patietn's vm.  Provider inquiry sent to address on file via standard mail.    Skip Smith RN  [facility]

## 2025-04-14 ENCOUNTER — OFFICE VISIT (OUTPATIENT)
Dept: ORTHOPEDICS | Facility: CLINIC | Age: 65
End: 2025-04-14
Payer: COMMERCIAL

## 2025-04-14 DIAGNOSIS — M17.0 PRIMARY OSTEOARTHRITIS OF BOTH KNEES: Primary | ICD-10-CM

## 2025-04-14 PROCEDURE — 20611 DRAIN/INJ JOINT/BURSA W/US: CPT | Mod: 50 | Performed by: STUDENT IN AN ORGANIZED HEALTH CARE EDUCATION/TRAINING PROGRAM

## 2025-04-14 PROCEDURE — 99207 PR DROP WITH A PROCEDURE: CPT | Performed by: STUDENT IN AN ORGANIZED HEALTH CARE EDUCATION/TRAINING PROGRAM

## 2025-04-14 RX ORDER — LIDOCAINE HYDROCHLORIDE 10 MG/ML
3 INJECTION, SOLUTION INFILTRATION; PERINEURAL
Status: COMPLETED | OUTPATIENT
Start: 2025-04-14 | End: 2025-04-14

## 2025-04-14 RX ADMIN — LIDOCAINE HYDROCHLORIDE 3 ML: 10 INJECTION, SOLUTION INFILTRATION; PERINEURAL at 10:17

## 2025-04-14 NOTE — LETTER
4/14/2025      Breana Conner  [address] [facility] [address]      Dear Colleague,    Thank you for referring your patient, Breana Conner, to the [facility] SPORTS MEDICINE CLINIC [facility]. Please see a copy of my visit note below.    Sports Medicine Procedure Note    Breana was seen today for pain and pain.    Diagnoses and all orders for this visit:    Primary osteoarthritis of both knees      Breana Conner is a/an 65 year old female who is seen for Synvisc injection of both knees.   Last injection: bilateral knee gel injections 9/25/24, lasted 6 months or longer for both knees      -Post-injection instructions were provided to the patient  -Return > 6 months for repeat  injection sooner if develops new or worsening symptoms.  -Aspirated synovial fluid from bilateral knees  -On both knees had small pustules anterior quad sparring knee. Made patient aware prior to injection. Did not apppear like acute allergy infection or hives. Denies fever or chills. Will continue to monitor and if spreads will follow-up with PCP.     Options for treatment and/or follow-up care were reviewed with the patient was actively involved in the decision making process. Patient verbalized understanding and was in agreement with the plan.  Agreeable to injection injection. Discussed risks and benefits. they are aware of risks such as but not limited to allergy, Soft tissue/ tendon damage, skin hypopigmentation, bleeding, and infection.       Large Joint Injection/Arthocentesis: bilateral knee synvisc one    Date/Time: 4/14/2025 10:17 AM    Performed by: Flower Lazaro DO  Authorized by: Flower Lazaro DO    Indications:  Pain, joint swelling and osteoarthritis  Needle Size:  18 G  Guidance: ultrasound    Approach:  Anterolateral  Location:  Knee  Laterality:  Bilateral      Medications (Right):  48 mg hylan 48 MG/6ML; 3 mL lidocaine 1 %  Aspirate amount (mL):  19  Aspirate:  Clear, yellow and blood-tinged  Medications  (Left):  48 mg hylan 48 MG/6ML; 3 mL lidocaine 1 %  Aspirate amount (mL):  5  Aspirate:  Clear  Outcome:  Tolerated well, no immediate complications  Procedure discussed: discussed risks, benefits, and alternatives    Consent Given by:  Patient  Timeout: timeout called immediately prior to procedure    Prep: patient was prepped and draped in usual sterile fashion     Ultrasound was used to ensure safe and accurate needle placement and injection. Ultrasound images of the procedure were permanently stored.  Pre-procedure: Patient present with small red bumps on both knees. Patient made aware these are present before procedure initiated.    Due to language barrier, an  was present during the history-taking procedure and subsequent discussion with this patient.       Flower [facility] SPORTS MEDICINE [facility]        Post-Hyaluronic Acid Injection Discharge Instructions    A hyaluronic acid gel injection was performed today in clinic  - Ok to shower today, but please do not submerge for 48 hours (including bath tub, hot tub or swimming)  - The lidocaine (local numbing medicine) will wear off in several hours. The Gel injection usually takes several weeks to begin working, so you may not notice a difference in pain for 3-6 weeks.  - Hyaluronic acid gel has a small risk of increasing pain/swelling. If this occurs, it generally happens within 24 hours but will usually resolve on its own. You may use ice packs for 15-20 minutes, 3 to 4 times a day at the injection site for comfort if needed, and I recommend limiting your activity. If pain/swelling continues, then please the office.  -Can repeat injection at 6 months with insurance authorization    If you experience any of the following, call Sports Medicine @ [phone] or [phone]  -Fever over 100 degrees F  -Swelling, bleeding, redness, drainage, warmth at the injection site  -New or significant worsening pain        Flower Lazaro DO  [facility]  Sports Medicine       Again, thank you for allowing me to participate in the care of your patient.        Sincerely,        Flower Lazaro DO    Electronically signed

## 2025-04-14 NOTE — PATIENT INSTRUCTIONS
1. Primary osteoarthritis of both knees      - Post-Hyaluronic Acid Injection Discharge Instructions    A hyaluronic acid gel injection was performed today in clinic  - Ok to shower today, but please do not submerge for 48 hours (including bath tub, hot tub or swimming)  - The lidocaine (local numbing medicine) will wear off in several hours. The Gel injection usually takes several weeks to begin working, so you may not notice a difference in pain for 3-6 weeks.  - Hyaluronic acid gel has a small risk of increasing pain/swelling. If this occurs, it generally happens within 24 hours but will usually resolve on its own. You may use ice packs for 15-20 minutes, 3 to 4 times a day at the injection site for comfort if needed, and I recommend limiting your activity. If pain/swelling continues, then please the office.  -Can repeat injection at 6 months with insurance authorization    If you experience any of the following, call Sports Medicine @ [phone] or [phone]  -Fever over 100 degrees F  -Swelling, bleeding, redness, drainage, warmth at the injection site  -New or significant worsening pain      Please call [phone]  Ask for my team if you have any questions or concerns  Flower Lazaro DO  [facility]  Thank you for choosing [facility] Sports Medicine!    CLINIC LOCATIONS:     [place]  TRIAGE LINE: [phone] [address] [facility] APPOINTMENTS: [phone]   [place] [address] RADIOLOGY: [phone]   (Monday, Thursday & Friday) PHYSICAL THERAPY: [phone]    BILLING QUESTIONS: [phone]   [place] FAX: [phone] [address]    [place] [address]    (Wednesday)

## 2025-04-15 ENCOUNTER — PATIENT OUTREACH (OUTPATIENT)
Dept: CARE COORDINATION | Facility: CLINIC | Age: 65
End: 2025-04-15
Payer: COMMERCIAL

## 2025-04-15 NOTE — PROGRESS NOTES
Clinic Care Coordination Contact  [facility]/Samaria Fenton  ID Brian    Clinical Data: Care Coordinator Outreach    Outreach Documentation Number of Outreach Attempt       4/15/2025   9:30 AM 1     Left message on patient's voicemail with call back information and requested return call.    Plan:  Care Coordinator will try to reach patient again in 10 business days.    Adia Najera  Community Health Worker   Clinic Care Coordination  [facility]    Phone: [phone]

## 2025-04-16 ENCOUNTER — THERAPY VISIT (OUTPATIENT)
Dept: OCCUPATIONAL THERAPY | Facility: CLINIC | Age: 65
End: 2025-04-16
Attending: FAMILY MEDICINE
Payer: COMMERCIAL

## 2025-04-16 DIAGNOSIS — Z74.09 IMPAIRED MOBILITY AND ADLS: Primary | ICD-10-CM

## 2025-04-16 DIAGNOSIS — Z78.9 IMPAIRED MOBILITY AND ADLS: Primary | ICD-10-CM

## 2025-04-16 DIAGNOSIS — R26.89 IMPAIRED GAIT AND MOBILITY: ICD-10-CM

## 2025-04-16 DIAGNOSIS — M17.0 PRIMARY OSTEOARTHRITIS OF BOTH KNEES: ICD-10-CM

## 2025-04-16 PROCEDURE — 97542 WHEELCHAIR MNGMENT TRAINING: CPT | Mod: GO | Performed by: OCCUPATIONAL THERAPIST

## 2025-04-16 NOTE — PROGRESS NOTES
"                                                                             SEATING AND WHEELED MOBILITY ASSESSMENT    [facility]  Occupational Therapy   Date of service: April 16, 2025    Referring provider:   Letitia Hope MD     Order Date 3/4/25  Onset Date: 3/4/25    Order Details: ALEC FRANCO    Funding: [facility]     present: Yes  Language: Jossy [id number]    Others present at visit:  Son and niece    Vendor: [employer]    Height/Weight: 4'10\" / 145    Medical diagnosis:   DMII  OA with knee injections  HTN    Living environment:  House    Living environment barriers:  2 stairs to enter (1 railing present)  0 stairs within home (1 railing present)      Current assistance/living environment:  Lives with son/daughter, niece    Community Mobility:  Transportation: Car, via son  Community Mobility Requirements: Medical Appointments, Shopping, Buddhism  Accessibility Requirements for Community Settings: family outings      Current mobility equipment:  Manual wheelchair  Borrowed manual chair-needs her own that is fit for her in order to use daily    Patient Measurements  Hip to knee (inches): 18  Knee to Heel (inches): 16  Hip to Hip (inches): 16  Chest Width (inches): 12  Shoulder Width (inches): 14    Fall Risk Screen:   Has the patient fallen 2 or more times in the last year? No      Has the patient fallen and had an injury in the past year? No       Timed Up and Go Score: Not able to perform due to pain    Is the patient a fall risk? Yes, department fall risk interventions implemented    ADL:   Feeding self:  ind  Grooming: assist  UE Dressing:  assist  LE Dressing:  assist  Showering/bathing: assist with shower chair  Toileting/transfer:  assist with incontinence  Functional Mobility: crawling or walker with assist    IADL: family completes all    Services:   24 hour care    Evaluation     Pain assessment:  5/10in B knees with recent injections    Cognition:  " niece provides plof    Vision: glasses    Hearing: wnl    Fatigue:  Reported Problems: limited standing and walking tolerance due to pain, limited indoor walking    Balance:  Unsupported Sitting Balance: Uses UE for Balance  Sitting Balance in Chair: Uses UE for Balance  Standing Balance: Physical Assistance Required    Ambulation:  Level of [place]: Min Assist  Assistive Device(s): Walker (standard)    Transfers:   Moderate assist    Neuromuscular:    Coordination:  WFL    Sensation:  Sensory Deficits Reported: occasional in feet    Head and Neck:  Head and Neck Position: WFL    Upper Extremities  UE ROM: WFL  UE Strength:  3-    Pelvis  Anterior/Posterior Pelvis Position: Partially Flexible, Posterior Tilt    Trunk:  Anterior/Posterior Trunk Position: Increased Thoracic Kyphosis, Partially Flexible    Lower Extremities:  LE ROM: WFL   LE Strength:  4-  Foot Positioning: Plantarflexed    Edema: pitting edema in ankles     Impairments:  Fatigue  Muscle atrophy  Coordination  Balance  Cognition  Pain  Range of motion     Treatment diagnosis:  Impaired mobility  Impaired activities of daily living     Recommendations/Plan of care:  Patient would benefit from interventions to enhance safety and independence.  Rehab potential good for stated goals.  Occupational therapy intervention for  wheelchair management.    Goals:   Target date:   Patient, family and/or caregiver will verbalize/demonstrate understanding of compensatory methods /equipment to enhance functional independence and safety.    Educational assessment/barriers to learning:  Emotional  Cognitive  Cultural/spiritual  Language    Treatment provided this date:   Wheelchair management, 30 minutes   Determined need for k3 manual chair and completed specs.     Response to treatment/recommendations: understanding    Goal attainment:  All goals met    Risks and benefits of evaluation/treatment have been explained.  Patient, family and/or caregiver are in  agreement with Plan of Care.     Timed Code Treatment Minutes: 30  Total Treatment Time (sum of timed and untimed services): 30    Electronically signed by:  Mikala Tejada OTR/L, ATP      Occupational Therapist, Assistive   [phone]      fax: [phone]      [email or web address]  [facility], [address]  [address]  [place]   [address]  2025    REQUISITION AND JUSTIFICATION FOR DURABLE MEDICAL EQUIPMENT    Patient Name:  Breana Conner  MR #:  [id number]  :  1960  Age/Gender:  65 year old female    CLINICAL CRITERIA FOR MOBILITY ASSISTIVE EQUIPMENT  Coverage Criteria Per Local Coverage Determination    A) Breana has mobility limitations due to oa and knee pain  that significantly impairs her ability to participate in all of her mobility-related activities of daily living (MRADL). Specifically affected are toileting (being able to get there in time to prevent accidents), dressing, and bathing (getting into the bathroom of designated place). Current equipment used is borrowed . This patient needs the new equipment requested to be able to allow for safe mradls participation. Please see additional documentation in the seating and wheeled mobility report for details.   Breana had a successful clinical trial here, and also a successful trial at home with the recommended equipment. Breana is very willing and physically / cognitively able to use the recommended equipment to assist her with mobility-related activities of daily living and general mobility.  B) Breana's mobility limitation cannot be sufficiently and safely resolved by the use of an appropriately fitted cane or walker because she is limited due to pain and instability. TUG results Nt as unable without assist. Strength of legs is 4- for one maximal repetition. Fatigue also impacts this patient's ability to ambulate, regardless of the gait aid.    C) Breana does not have  sufficient upper extremity function to self-propel a k1 chair and requires a k3 optimally-configured manual wheelchair in her home to perform MRADLs during a typical day due to limitations in strength, endurance, range of motion, and coordination.  Strength of arms is 4-.  D) The need for this equipment is LIFETIME.     RECOMMENDATIONS FOR MOBILITY BASE, SEATING SYSTEM AND COMPONENTS    K3 lightweight manual wheelchair - this light weight manual wheelchair is appropriate and necessary for her to be able to assist and complete all of her MRADLs within her residence. She has mobility limitations impacting her ability to ambulate independently or with any ambulation aid. She has had a thorough clinical evaluation, and this manual wheelchair is the best option for this patient.      Rear anti-tip tubes - for safety to prevent w/c tipping rearward    Pelvic positioning strap - to assist maintaining pelvis position for functional activities    General seat cushion - this seat cushion will optimally  distribute seating pressures to prevent pressure ulcers, but also provide a stable base of support for her to use during MRADLs.    Padded back support - adjustable back support is needed to support Breana's thoracolumbar area in an upright and midline position, with appropriate support pads as needed. This back support is essential to provide sufficient posterior support to maximize her postural alignment and minimize her tendency to develop scoliosis and other secondary complications.    This equipment is reasonable and necessary with reference to accepted standards of medical practice and treatment of this patient's condition and is not being recommended as a convenience item. Without this recommended equipment, she is highly likely to sustain injuries from falls, develop pressure sores or postural compensation, and/or be bed confined, which those costs far exceed the cost of the requested equipment.    Electronically signed  by:  JEANIE Russell      Occupational Therapist, Assistive   [phone]      fax: [phone]      [email or web address]  [facility] [address]   [address]  April 16, 2025    I have read and concur with the above recommendations.    Physician Printed Name __________________________________________    Physician SIgnature  _____________________________________________    Date of SIgnature ______________________________    Physician Phone  ______________________________

## 2025-04-28 ENCOUNTER — PATIENT OUTREACH (OUTPATIENT)
Dept: CARE COORDINATION | Facility: CLINIC | Age: 65
End: 2025-04-28
Payer: COMMERCIAL

## 2025-04-28 NOTE — PROGRESS NOTES
Clinic Care Coordination Contact  Care Coordination Clinician Chart Review    Situation: Patient chart reviewed by Care Coordinator.       Background: Care Coordination Program started: 3/4/2025. Initial assessment completed and patient-centered care plan(s) were developed with participation from patient. Lead CC handed patient off to CHW for continued outreaches.       Assessment: Per chart review, patient outreach completed by CC CHW on 3/4/25.  Patient is actively working to accomplish goal(s). Patient's goal(s) appropriate and relevant at this time. Patient is not due for updated Plan of Care.  Assessments will be completed annually or as needed/with change of patient status.    CCRN tried calling patient today but had to leave mail. Unable to confirm if anyone reaches out to patient re: wheel chair since she last saw OT on 4/16/25.     Per chart review, patient attended her appointment with OT for wheel chair evaluation. Visit notes reviewed. Unable to tell if OT already send the wheel chair order to a DME company. CCRN sent to message to Mikala Tejada OT today to clarify. CHW to fax the order to [employer] if needed.    Moved CHW outreach on 4/29/25 to 5/13/25. May need to change date pending updates from OT.           Care Plan: Wheel chair       Problem: impaired mobility       Goal: Patient would like to explore if she could qualify for a wheel chair in the next 90 days.       Start Date: 3/18/2025 Expected End Date: 6/30/2025    Recent Progress: 10%    Note:     Barriers: language   Strengths: Accepting of support  Patient expressed understanding of goal: Yes    Action steps to achieve this goal:  1. I will complete a face-to-face appointment with my PCP if needed  2. I will complete an OT/PT evaluation if it is recommended for me to obtain DMEs on 4/16/25 at [facility] Te[facility] OT at 1:30 pm. Completed.   3. I will answer my phone when DME Provider  contacts me to deliver or  my item.  4. I  will follow up with CCC in the next month regarding this goal for additional coordination support.    Note: Order for a TBD  was sent to DME Provider  on TBD                                   Plan/Recommendations: The patient will continue working with Care Coordination to achieve goal(s) as above. CHW will continue outreaches at minimum every 30 days and will involve Lead CC as needed or if patient is ready to move to Maintenance. Lead CC will continue to monitor CHW outreaches and patient's progress to goal(s) every 6 weeks.     Plan of Care updated and sent to patient: No

## 2025-04-29 ENCOUNTER — OFFICE VISIT (OUTPATIENT)
Dept: FAMILY MEDICINE | Facility: CLINIC | Age: 65
End: 2025-04-29
Payer: COMMERCIAL

## 2025-04-29 VITALS
BODY MASS INDEX: 30.26 KG/M2 | DIASTOLIC BLOOD PRESSURE: 87 MMHG | TEMPERATURE: 98.7 F | WEIGHT: 150.08 LBS | SYSTOLIC BLOOD PRESSURE: 142 MMHG | HEART RATE: 90 BPM | RESPIRATION RATE: 22 BRPM | OXYGEN SATURATION: 97 % | HEIGHT: 59 IN

## 2025-04-29 DIAGNOSIS — R26.89 IMPAIRED GAIT AND MOBILITY: ICD-10-CM

## 2025-04-29 DIAGNOSIS — Z23 NEED FOR VACCINATION: ICD-10-CM

## 2025-04-29 DIAGNOSIS — I10 BENIGN ESSENTIAL HYPERTENSION: ICD-10-CM

## 2025-04-29 DIAGNOSIS — E11.65 TYPE 2 DIABETES MELLITUS WITH HYPERGLYCEMIA, WITHOUT LONG-TERM CURRENT USE OF INSULIN (H): Primary | ICD-10-CM

## 2025-04-29 DIAGNOSIS — B35.4 TINEA CORPORIS: ICD-10-CM

## 2025-04-29 LAB
EST. AVERAGE GLUCOSE BLD GHB EST-MCNC: 160 MG/DL
HBA1C MFR BLD: 7.2 % (ref 0–5.6)

## 2025-04-29 PROCEDURE — 90471 IMMUNIZATION ADMIN: CPT | Performed by: FAMILY MEDICINE

## 2025-04-29 PROCEDURE — 36415 COLL VENOUS BLD VENIPUNCTURE: CPT | Performed by: FAMILY MEDICINE

## 2025-04-29 PROCEDURE — 83036 HEMOGLOBIN GLYCOSYLATED A1C: CPT | Performed by: FAMILY MEDICINE

## 2025-04-29 PROCEDURE — 3077F SYST BP >= 140 MM HG: CPT | Performed by: FAMILY MEDICINE

## 2025-04-29 PROCEDURE — 82570 ASSAY OF URINE CREATININE: CPT | Performed by: FAMILY MEDICINE

## 2025-04-29 PROCEDURE — 99214 OFFICE O/P EST MOD 30 MIN: CPT | Mod: 25 | Performed by: FAMILY MEDICINE

## 2025-04-29 PROCEDURE — 90750 HZV VACC RECOMBINANT IM: CPT | Performed by: FAMILY MEDICINE

## 2025-04-29 PROCEDURE — 3079F DIAST BP 80-89 MM HG: CPT | Performed by: FAMILY MEDICINE

## 2025-04-29 PROCEDURE — G2211 COMPLEX E/M VISIT ADD ON: HCPCS | Performed by: FAMILY MEDICINE

## 2025-04-29 PROCEDURE — 82043 UR ALBUMIN QUANTITATIVE: CPT | Performed by: FAMILY MEDICINE

## 2025-04-29 RX ORDER — HYDROCHLOROTHIAZIDE 25 MG/1
25 TABLET ORAL DAILY
Qty: 30 TABLET | Refills: 11 | Status: SHIPPED | OUTPATIENT
Start: 2025-04-29

## 2025-04-29 RX ORDER — FLUCONAZOLE 150 MG/1
150 TABLET ORAL WEEKLY
Qty: 4 TABLET | Refills: 0 | Status: SHIPPED | OUTPATIENT
Start: 2025-04-29

## 2025-04-29 NOTE — PROGRESS NOTES
"  Assessment & Plan     Type 2 diabetes mellitus with hyperglycemia, without long-term current use of insulin (H)  A1c is at goal < 8%. Taking metformin 500mg BID and glipizide - would like to minimize number of different pills so will stop glipizide and increase metformin to 1000mg BID. Follow up in 3mos.  - Hemoglobin A1c; Future  - Albumin Random Urine Quantitative with Creat Ratio; Future  - Hemoglobin A1c  - Albumin Random Urine Quantitative with Creat Ratio    Tinea corporis  Improved with fluconazole weekly x4w but then returned. Will repeat course and send derm e-consult for recommendations on whether to consider different diagnoses and treatment.  - fluconazole (DIFLUCAN) 150 MG tablet; Take 1 tablet (150 mg) by mouth once a week.  - Adult E-Consult to Dermatology (Outpt Provider to Specialist Written Question & Response)    Benign essential hypertension  BP not at goal < 140/90. Increase hydrochlorothiazide to 25mg. Come back in 1mo for BP check with RN team. IF BP remains > 140/90. Would need to add third agent.  - hydroCHLOROthiazide (HYDRODIURIL) 25 MG tablet; Take 1 tablet (25 mg) by mouth daily.    Need for vaccination  - ZOSTER RECOMBINANT ADJUVANTED (SHINGRIX)    Impaired gait and mobility  Filled out disability parking chepe today.          BMI  Estimated body mass index is 30.73 kg/m  as calculated from the following:    Height as of this encounter: 1.488 m (4' 10.6\").    Weight as of this encounter: 68.1 kg (150 lb 1.3 oz).   Weight management plan: Discussed healthy diet and exercise guidelines      Follow up for hypertension and diabetes in 3mos.    Subjective   Naw is a 65 year old, presenting for the following health issues:  Diabetes, Hypertension, and Derm Problem (Rash reappears after stop taking fluconazole)        4/29/2025     2:22 PM   Additional Questions   Roomed by paw p   Accompanied by daughter     History of Present Illness       Diabetes:   She presents for follow up of diabetes.  " "She is checking home blood glucose two times daily.   She checks blood glucose before and after meals and at bedtime.  Blood glucose is sometimes over 200 and never under 70.  When her blood glucose is low, the patient is asymptomatic for confusion, blurred vision, lethargy and reports not feeling dizzy, shaky, or weak.  She is concerned about frequent infections.   She is having weight gain.            Hypertension: She presents for follow up of hypertension.  She does check blood pressure  regularly outside of the clinic. Outside blood pressures have been over 140/90. She follows a low salt diet.       Rash - went away with fluconazole then came back  Itchy sometimes - not every day  Has had for 2-3y  All over body    Bps at home have sometimes been 120-130s but then also 140s    Wonders I they can stop glipizide because she takesa lot of different pills and wants to minimize    Needs disability placard filled out                     Review of Systems  Constitutional, HEENT, cardiovascular, pulmonary, gi and gu systems are negative, except as otherwise noted.      Objective    BP (!) 142/87   Pulse 90   Temp 98.7  F (37.1  C) (Oral)   Resp 22   Ht 1.488 m (4' 10.6\")   Wt 68.1 kg (150 lb 1.3 oz)   SpO2 97%   BMI 30.73 kg/m    Body mass index is 30.73 kg/m .  Physical Exam   General: well-appearing, no acute distress  HEENT: normocephalic, atraumatic  Eyes: conjunctivae normal  Neck: normal ROM  Cardiovascular: regular rate and rhythm, normal S1 and S2, no murmurs/rubs/gallops  Pulmonary: no increased work of breathing, clear to auscultation bilaterally, no wheezes/rales/rhonchi  Musculoskeletal: normal ROM, no deformity  Neurological: gross motor exam normal  Skin: diffuse annular rash on arms and legs - see media tab          Prior to immunization administration, verified patients identity using patient s name and date of birth. Please see Immunization Activity for additional information.     Screening " Questionnaire for Adult Immunization    Are you sick today?   No   Do you have allergies to medications, food, a vaccine component or latex?   Yes. Beef and fish paste   Have you ever had a serious reaction after receiving a vaccination?   No   Do you have a long-term health problem with heart, lung, kidney, or metabolic disease (e.g., diabetes), asthma, a blood disorder, no spleen, complement component deficiency, a cochlear implant, or a spinal fluid leak?  Are you on long-term aspirin therapy?   No   Do you have cancer, leukemia, HIV/AIDS, or any other immune system problem?   No   Do you have a parent, brother, or sister with an immune system problem?   No   In the past 3 months, have you taken medications that affect  your immune system, such as prednisone, other steroids, or anticancer drugs; drugs for the treatment of rheumatoid arthritis, Crohn s disease, or psoriasis; or have you had radiation treatments?   No   Have you had a seizure, or a brain or other nervous system problem?   No   During the past year, have you received a transfusion of blood or blood    products, or been given immune (gamma) globulin or antiviral drug?   No   For women: Are you pregnant or is there a chance you could become       pregnant during the next month?   No   Have you received any vaccinations in the past 4 weeks?   No     Immunization questionnaire answers were all negative. At least one is positive and PCP is aware.      Patient instructed to remain in clinic for 15 minutes afterwards, and to report any adverse reactions.     Screening performed by Destin Arias MA on 4/29/2025 at 3:16 PM.         Signed Electronically by: Letitia Hope MD

## 2025-04-30 ENCOUNTER — TELEPHONE (OUTPATIENT)
Dept: FAMILY MEDICINE | Facility: CLINIC | Age: 65
End: 2025-04-30
Payer: COMMERCIAL

## 2025-04-30 DIAGNOSIS — R21 RASH: Primary | ICD-10-CM

## 2025-04-30 DIAGNOSIS — E11.65 TYPE 2 DIABETES MELLITUS WITH HYPERGLYCEMIA, WITHOUT LONG-TERM CURRENT USE OF INSULIN (H): Primary | ICD-10-CM

## 2025-04-30 LAB
CREAT UR-MCNC: 106 MG/DL
MICROALBUMIN UR-MCNC: 12.7 MG/L
MICROALBUMIN/CREAT UR: 11.98 MG/G CR (ref 0–25)

## 2025-04-30 NOTE — TELEPHONE ENCOUNTER
----- Message from Letitia Owens-Andrew sent at 4/30/2025  1:29 PM CDT -----  RN team - please call patient with results. The urine test that we did yesterday showed no damage to the kidneys. I would like to repeat it one more time when she comes back for her blood pressure check - order placed, please schedule lab visit same day as BP check or make a note that she should go to lab that day. Thanks

## 2025-04-30 NOTE — RESULT ENCOUNTER NOTE
RN team - please call patient with results. The urine test that we did yesterday showed no damage to the kidneys. I would like to repeat it one more time when she comes back for her blood pressure check - order placed, please schedule lab visit same day as BP check or make a note that she should go to lab that day. Thanks

## 2025-05-01 ENCOUNTER — MEDICAL CORRESPONDENCE (OUTPATIENT)
Dept: HEALTH INFORMATION MANAGEMENT | Facility: CLINIC | Age: 65
End: 2025-05-01

## 2025-05-01 ENCOUNTER — E-CONSULT (OUTPATIENT)
Dept: DERMATOLOGY | Facility: CLINIC | Age: 65
End: 2025-05-01
Payer: COMMERCIAL

## 2025-05-01 NOTE — PROGRESS NOTES
5/1/2025     E-Consult has been denied due to: Complexity of question, needs in-person referral.    Interprofessional consultation requested by:  Letitia Hope MD      Clinical Question/Purpose: MY CLINICAL QUESTION IS: 66yo female with 2-3y of diffuse rash, treated for tinea corporis with topicals without improvement, then treated with 4 week course of weekly fluconazole with resolution but returned after stopping. Any other diagnoses on differential that I should be considering? Recommendations for further workup and or treatment?    Patient assessment and information reviewed: notes, photos, labs    Recommendations:   Tinea corporis is high on the differential, however fluconazole is not a typical treatment for this condition. However, the differential includes granuloma annulare and subacute cutaneous lupus erythematosus. Given this clinical uncertainty, recommend in-person assessment for diagnostic testing such as a KOH and/or skin biopsy. In the interim, could consider checking BONG and SSA/SSB.      The recommendations provided in this E-Consult are based on a review of clinical data pertinent to the clinical question presented, without a review of the patient's complete medical record or, the benefit of a comprehensive in-person or virtual patient evaluation. This consultation should not replace the clinical judgement and evaluation of the provider ordering this E-Consult. Any new clinical issues, or changes in patient status since the filing of this E-Consult will need to be taken into account when assessing these recommendations. Please contact me if you have further questions.    My total time spent reviewing clinical information and formulating assessment was 5 minutes.    Wyatt Gomez MD, FAAD   of Dermatology  Department of Dermatology  [facility]

## 2025-05-02 ENCOUNTER — APPOINTMENT (OUTPATIENT)
Dept: INTERPRETER SERVICES | Facility: CLINIC | Age: 65
End: 2025-05-02
Payer: COMMERCIAL

## 2025-05-02 NOTE — TELEPHONE ENCOUNTER
When you call patient for results, please also let her know that the skin doctor recommends we check some additional blood tests to see if there is something else causing her rash, and that she be seen in person by dermatology. I will put in referral - please connect to F2F. Labs ordered - please assist in scheduling lab only appointment.

## 2025-05-02 NOTE — TELEPHONE ENCOUNTER
"Writer attempt #1 to call patient with the help of an [facility] \"Jossy\"  regarding clinician's message below. Clinician's message relayed to patient's daughter, Tl Say (CTC on file). Clinician's message regarding lab results and dermatology referral relayed to daughter.    Scheduled lab-only appointment for:    May 30, 2025 11:00 AM  Nurse Visit with SPRO RN 1  [facility] [phone]     May 30, 2025 11:30 AM  LAB with [facility]  [facility] ([facility]) [phone]     Routing message to [facility] Scheduling team to assist with scheduling Dermatology appointment.    Geovany Najera, BSN, RN, PHN   [facility]         "

## 2025-05-03 DIAGNOSIS — I10 BENIGN ESSENTIAL HYPERTENSION: ICD-10-CM

## 2025-05-03 RX ORDER — HYDROCHLOROTHIAZIDE 12.5 MG/1
12.5 TABLET ORAL DAILY
Qty: 30 TABLET | Refills: 3 | OUTPATIENT
Start: 2025-05-03

## 2025-05-05 ENCOUNTER — PATIENT OUTREACH (OUTPATIENT)
Dept: CARE COORDINATION | Facility: CLINIC | Age: 65
End: 2025-05-05
Payer: COMMERCIAL

## 2025-05-05 NOTE — TELEPHONE ENCOUNTER
Attempt X1 to scheduled dermatology appt, lvm for return call.   [facility] D[facility] [phone]

## 2025-05-12 NOTE — TELEPHONE ENCOUNTER
Attempt X2.   Called x2 to scheduled dermatology appt, lvm for return call.   [facility] D[facility] [phone]

## 2025-05-13 NOTE — TELEPHONE ENCOUNTER
Per son, Derm scheduled for :     [facility]  [address]  [place] [address]  P: [phone]  F: [phone]  Date; 5/20/2025  Time 12:45 pm

## 2025-05-20 ENCOUNTER — PATIENT OUTREACH (OUTPATIENT)
Dept: CARE COORDINATION | Facility: CLINIC | Age: 65
End: 2025-05-20
Payer: COMMERCIAL

## 2025-05-20 NOTE — PROGRESS NOTES
Clinic Care Coordination Contact  [facility]/Samaria Fenton  ID [id number]    Clinical Data: Care Coordinator Outreach    Outreach Documentation Number of Outreach Attempt       5/20/2025  11:04 AM 1     Left message on patient's voicemail with call back information and requested return call.    Plan: Care Coordinator will try to reach patient again in 10 business days.    Adia Najera  Community Health Worker   Clinic Care Coordination  [facility]    Phone: [phone]

## 2025-06-02 ENCOUNTER — PATIENT OUTREACH (OUTPATIENT)
Dept: CARE COORDINATION | Facility: CLINIC | Age: 65
End: 2025-06-02

## 2025-06-02 NOTE — PROGRESS NOTES
Clinic Care Coordination Contact  Community Health Worker Follow Up  Spoke with Ese Deng (Son) Dipti Fenton  ID [id number]    Care Gaps:     Health Maintenance Due   Topic Date Due    ADVANCE CARE PLANNING  Never done    COVID-19 VACCINE (4 - Mixed Product risk 2024-25 season) 04/03/2025    COLORECTAL CANCER SCREENING  05/24/2025     Scheduled 7/31/25 at 11:10 am with Dr. Wood.       Care Plan:   Care Plan: Wheel chair       Problem: impaired mobility       Goal: Patient would like to explore if she could qualify for a wheel chair in the next 90 days.       Start Date: 3/18/2025 Expected End Date: 6/30/2025    This Visit's Progress: 100% Recent Progress: 10%    Priority: Medium    Note:     Barriers: language   Strengths: Accepting of support  Patient expressed understanding of goal: Yes    Action steps to achieve this goal:  1. I will complete a face-to-face appointment with my PCP if needed  2. I will complete an OT/PT evaluation if it is recommended for me to obtain DMEs on 4/16/25 at Mik[facility] Tejada OT at 1:30 pm. Completed.   3. I will answer my phone when DME Provider  contacts me to deliver or  my item.  4. I will follow up with CCC in the next month regarding this goal for additional coordination support.    Note: Order for a TBD  was sent to DME Provider  on TBD                            Intervention and Education during outreach:   Ese returned call and is on patients consent to communicate form. CHW introduced self and intent of call.   Ese confirmed that patient received wheelchair and it was delivered on 5/21 by [email or web address] [phone]. So was able to look at the delivery form to clarify information.     CHW inquired if patient tneeds any additional support or resources. Ese declined.     CHW Plan: Routing to lead clinician to review for graduation or maintenance. If maintenance is accepted, next outreach will be in 2 months    Adia Najera  Community Health Worker   Clinic Care Coordination  [facility]  [facility]    Phone: [phone]

## 2025-06-03 ENCOUNTER — RESULTS FOLLOW-UP (OUTPATIENT)
Dept: FAMILY MEDICINE | Facility: CLINIC | Age: 65
End: 2025-06-03

## 2025-06-12 LAB — NONINV COLON CA DNA+OCC BLD SCRN STL QL: NEGATIVE

## 2025-07-10 DIAGNOSIS — E78.5 HYPERLIPIDEMIA LDL GOAL <130: ICD-10-CM

## 2025-07-10 RX ORDER — ATORVASTATIN CALCIUM 40 MG/1
40 TABLET, FILM COATED ORAL DAILY
Qty: 90 TABLET | Refills: 0 | Status: SHIPPED | OUTPATIENT
Start: 2025-07-10

## 2025-07-10 NOTE — TELEPHONE ENCOUNTER
[employer] worker called and requested for the meds to be refilled. The patient is completely out of it and hopes to have it sent to [facility]. Writer will update request and inform care team.     cristhian Osborne worker  [phone]